# Patient Record
Sex: FEMALE | Race: WHITE | Employment: FULL TIME | URBAN - METROPOLITAN AREA
[De-identification: names, ages, dates, MRNs, and addresses within clinical notes are randomized per-mention and may not be internally consistent; named-entity substitution may affect disease eponyms.]

---

## 2017-04-04 ENCOUNTER — HOSPITAL ENCOUNTER (INPATIENT)
Age: 37
LOS: 1 days | Discharge: HOME | DRG: 639 | End: 2017-04-04
Attending: EMERGENCY MEDICINE | Admitting: INTERNAL MEDICINE
Payer: COMMERCIAL

## 2017-04-04 VITALS
DIASTOLIC BLOOD PRESSURE: 74 MMHG | RESPIRATION RATE: 18 BRPM | OXYGEN SATURATION: 99 % | HEART RATE: 88 BPM | TEMPERATURE: 98.3 F | SYSTOLIC BLOOD PRESSURE: 112 MMHG

## 2017-04-04 DIAGNOSIS — E10.10 DIABETIC KETOACIDOSIS WITHOUT COMA ASSOCIATED WITH TYPE 1 DIABETES MELLITUS (HCC): Primary | ICD-10-CM

## 2017-04-04 PROBLEM — E10.9 DIABETES MELLITUS TYPE 1 (HCC): Status: ACTIVE | Noted: 2017-04-04

## 2017-04-04 PROBLEM — E11.10 DKA (DIABETIC KETOACIDOSES): Status: ACTIVE | Noted: 2017-04-04

## 2017-04-04 LAB
ALBUMIN SERPL BCP-MCNC: 4.4 G/DL (ref 3.5–5)
ALBUMIN/GLOB SERPL: 1.5 {RATIO} (ref 1.2–3.5)
ALP SERPL-CCNC: 85 U/L (ref 50–136)
ALT SERPL-CCNC: 18 U/L (ref 12–65)
ANION GAP BLD CALC-SCNC: 17 MMOL/L (ref 7–16)
ANION GAP BLD CALC-SCNC: 8 MMOL/L (ref 7–16)
ANION GAP BLD CALC-SCNC: 8 MMOL/L (ref 7–16)
ARTERIAL PATENCY WRIST A: ABNORMAL
AST SERPL W P-5'-P-CCNC: 21 U/L (ref 15–37)
BASE DEFICIT BLDA-SCNC: 5.9 MMOL/L (ref 0–2)
BASOPHILS # BLD AUTO: 0 K/UL (ref 0–0.2)
BASOPHILS # BLD: 1 % (ref 0–2)
BDY SITE: ABNORMAL
BILIRUB SERPL-MCNC: 1.2 MG/DL (ref 0.2–1.1)
BUN SERPL-MCNC: 17 MG/DL (ref 6–23)
CALCIUM SERPL-MCNC: 7.9 MG/DL (ref 8.3–10.4)
CALCIUM SERPL-MCNC: 8.1 MG/DL (ref 8.3–10.4)
CALCIUM SERPL-MCNC: 8.4 MG/DL (ref 8.3–10.4)
CHLORIDE SERPL-SCNC: 106 MMOL/L (ref 98–107)
CHLORIDE SERPL-SCNC: 106 MMOL/L (ref 98–107)
CHLORIDE SERPL-SCNC: 96 MMOL/L (ref 98–107)
CO2 SERPL-SCNC: 18 MMOL/L (ref 21–32)
CO2 SERPL-SCNC: 24 MMOL/L (ref 21–32)
CO2 SERPL-SCNC: 25 MMOL/L (ref 21–32)
COHGB MFR BLD: 0.4 % (ref 0.5–1.5)
CREAT SERPL-MCNC: 0.74 MG/DL (ref 0.6–1)
CREAT SERPL-MCNC: 0.74 MG/DL (ref 0.6–1)
CREAT SERPL-MCNC: 0.95 MG/DL (ref 0.6–1)
DIFFERENTIAL METHOD BLD: NORMAL
DO-HGB BLD-MCNC: 2 % (ref 0–5)
EOSINOPHIL # BLD: 0.2 K/UL (ref 0–0.8)
EOSINOPHIL NFR BLD: 2 % (ref 0.5–7.8)
ERYTHROCYTE [DISTWIDTH] IN BLOOD BY AUTOMATED COUNT: 12.3 % (ref 11.9–14.6)
EST. AVERAGE GLUCOSE BLD GHB EST-MCNC: 189 MG/DL
FIO2 ON VENT: 21 %
GLOBULIN SER CALC-MCNC: 2.9 G/DL (ref 2.3–3.5)
GLUCOSE BLD STRIP.AUTO-MCNC: 146 MG/DL (ref 65–100)
GLUCOSE BLD STRIP.AUTO-MCNC: 195 MG/DL (ref 65–100)
GLUCOSE BLD STRIP.AUTO-MCNC: 203 MG/DL (ref 65–100)
GLUCOSE BLD STRIP.AUTO-MCNC: 461 MG/DL (ref 65–100)
GLUCOSE SERPL-MCNC: 184 MG/DL (ref 65–100)
GLUCOSE SERPL-MCNC: 189 MG/DL (ref 65–100)
GLUCOSE SERPL-MCNC: 487 MG/DL (ref 65–100)
HBA1C MFR BLD: 8.2 % (ref 4.8–6)
HCO3 BLDA-SCNC: 17 MMOL/L (ref 22–26)
HCT VFR BLD AUTO: 39.4 % (ref 35.8–46.3)
HGB BLD-MCNC: 13.1 G/DL (ref 11.7–15.4)
HGB BLDMV-MCNC: 12.9 GM/DL (ref 11.7–15)
IMM GRANULOCYTES # BLD: 0 K/UL (ref 0–0.5)
IMM GRANULOCYTES NFR BLD AUTO: 0.3 % (ref 0–5)
LYMPHOCYTES # BLD AUTO: 14 % (ref 13–44)
LYMPHOCYTES # BLD: 1 K/UL (ref 0.5–4.6)
MAGNESIUM SERPL-MCNC: 1.7 MG/DL (ref 1.8–2.4)
MAGNESIUM SERPL-MCNC: 1.8 MG/DL (ref 1.8–2.4)
MAGNESIUM SERPL-MCNC: 1.8 MG/DL (ref 1.8–2.4)
MCH RBC QN AUTO: 29.5 PG (ref 26.1–32.9)
MCHC RBC AUTO-ENTMCNC: 33.2 G/DL (ref 31.4–35)
MCV RBC AUTO: 88.7 FL (ref 79.6–97.8)
METHGB MFR BLD: 0.3 % (ref 0–1.5)
MONOCYTES # BLD: 0.7 K/UL (ref 0.1–1.3)
MONOCYTES NFR BLD AUTO: 10 % (ref 4–12)
NEUTS SEG # BLD: 5.1 K/UL (ref 1.7–8.2)
NEUTS SEG NFR BLD AUTO: 73 % (ref 43–78)
OXYHGB MFR BLDA: 97.1 % (ref 94–97)
PCO2 BLDA: 28 MMHG (ref 35–45)
PH BLDA: 7.41 [PH] (ref 7.35–7.45)
PHOSPHATE SERPL-MCNC: 1.6 MG/DL (ref 2.5–4.5)
PLATELET # BLD AUTO: 204 K/UL (ref 150–450)
PMV BLD AUTO: 10.8 FL (ref 10.8–14.1)
PO2 BLDA: 112 MMHG (ref 75–100)
POTASSIUM SERPL-SCNC: 3.9 MMOL/L (ref 3.5–5.1)
POTASSIUM SERPL-SCNC: 4.1 MMOL/L (ref 3.5–5.1)
POTASSIUM SERPL-SCNC: 5.8 MMOL/L (ref 3.5–5.1)
PROT SERPL-MCNC: 7.3 G/DL (ref 6.3–8.2)
RBC # BLD AUTO: 4.44 M/UL (ref 4.05–5.25)
SAO2 % BLD: 98 % (ref 92–98.5)
SERVICE CMNT-IMP: ABNORMAL
SODIUM SERPL-SCNC: 131 MMOL/L (ref 136–145)
SODIUM SERPL-SCNC: 138 MMOL/L (ref 136–145)
SODIUM SERPL-SCNC: 139 MMOL/L (ref 136–145)
VENTILATION MODE VENT: ABNORMAL
WBC # BLD AUTO: 7 K/UL (ref 4.3–11.1)

## 2017-04-04 PROCEDURE — 96374 THER/PROPH/DIAG INJ IV PUSH: CPT | Performed by: EMERGENCY MEDICINE

## 2017-04-04 PROCEDURE — 74011000258 HC RX REV CODE- 258: Performed by: INTERNAL MEDICINE

## 2017-04-04 PROCEDURE — 74011636637 HC RX REV CODE- 636/637: Performed by: INTERNAL MEDICINE

## 2017-04-04 PROCEDURE — 65610000006 HC RM INTENSIVE CARE

## 2017-04-04 PROCEDURE — 36600 WITHDRAWAL OF ARTERIAL BLOOD: CPT

## 2017-04-04 PROCEDURE — 80053 COMPREHEN METABOLIC PANEL: CPT | Performed by: EMERGENCY MEDICINE

## 2017-04-04 PROCEDURE — 83735 ASSAY OF MAGNESIUM: CPT | Performed by: INTERNAL MEDICINE

## 2017-04-04 PROCEDURE — 85025 COMPLETE CBC W/AUTO DIFF WBC: CPT | Performed by: EMERGENCY MEDICINE

## 2017-04-04 PROCEDURE — 81003 URINALYSIS AUTO W/O SCOPE: CPT | Performed by: EMERGENCY MEDICINE

## 2017-04-04 PROCEDURE — 99285 EMERGENCY DEPT VISIT HI MDM: CPT | Performed by: EMERGENCY MEDICINE

## 2017-04-04 PROCEDURE — 74011636637 HC RX REV CODE- 636/637: Performed by: EMERGENCY MEDICINE

## 2017-04-04 PROCEDURE — 74011250636 HC RX REV CODE- 250/636: Performed by: INTERNAL MEDICINE

## 2017-04-04 PROCEDURE — 82803 BLOOD GASES ANY COMBINATION: CPT

## 2017-04-04 PROCEDURE — 80048 BASIC METABOLIC PNL TOTAL CA: CPT | Performed by: EMERGENCY MEDICINE

## 2017-04-04 PROCEDURE — 83036 HEMOGLOBIN GLYCOSYLATED A1C: CPT | Performed by: INTERNAL MEDICINE

## 2017-04-04 PROCEDURE — 96361 HYDRATE IV INFUSION ADD-ON: CPT | Performed by: EMERGENCY MEDICINE

## 2017-04-04 PROCEDURE — 80048 BASIC METABOLIC PNL TOTAL CA: CPT | Performed by: INTERNAL MEDICINE

## 2017-04-04 PROCEDURE — 84100 ASSAY OF PHOSPHORUS: CPT | Performed by: INTERNAL MEDICINE

## 2017-04-04 PROCEDURE — 83735 ASSAY OF MAGNESIUM: CPT | Performed by: EMERGENCY MEDICINE

## 2017-04-04 PROCEDURE — 74011250636 HC RX REV CODE- 250/636: Performed by: EMERGENCY MEDICINE

## 2017-04-04 PROCEDURE — 82962 GLUCOSE BLOOD TEST: CPT

## 2017-04-04 RX ORDER — DEXTROSE 40 %
15 GEL (GRAM) ORAL AS NEEDED
Status: DISCONTINUED | OUTPATIENT
Start: 2017-04-04 | End: 2017-04-04 | Stop reason: HOSPADM

## 2017-04-04 RX ORDER — DEXTROSE 50 % IN WATER (D50W) INTRAVENOUS SYRINGE
25-50 AS NEEDED
Status: DISCONTINUED | OUTPATIENT
Start: 2017-04-04 | End: 2017-04-04 | Stop reason: HOSPADM

## 2017-04-04 RX ORDER — MONTELUKAST SODIUM 10 MG/1
10 TABLET ORAL DAILY
Status: CANCELLED | OUTPATIENT
Start: 2017-04-05

## 2017-04-04 RX ORDER — SODIUM CHLORIDE 0.9 % (FLUSH) 0.9 %
5-10 SYRINGE (ML) INJECTION EVERY 8 HOURS
Status: DISCONTINUED | OUTPATIENT
Start: 2017-04-04 | End: 2017-04-04 | Stop reason: HOSPADM

## 2017-04-04 RX ORDER — SODIUM CHLORIDE 9 MG/ML
200 INJECTION, SOLUTION INTRAVENOUS CONTINUOUS
Status: DISCONTINUED | OUTPATIENT
Start: 2017-04-04 | End: 2017-04-04 | Stop reason: HOSPADM

## 2017-04-04 RX ORDER — ALBUTEROL SULFATE 90 UG/1
2 AEROSOL, METERED RESPIRATORY (INHALATION)
Status: CANCELLED | OUTPATIENT
Start: 2017-04-04

## 2017-04-04 RX ORDER — SODIUM CHLORIDE 0.9 % (FLUSH) 0.9 %
5-10 SYRINGE (ML) INJECTION EVERY 8 HOURS
Status: DISCONTINUED | OUTPATIENT
Start: 2017-04-04 | End: 2017-04-04

## 2017-04-04 RX ORDER — ACETAMINOPHEN 325 MG/1
650 TABLET ORAL
Status: DISCONTINUED | OUTPATIENT
Start: 2017-04-04 | End: 2017-04-04 | Stop reason: HOSPADM

## 2017-04-04 RX ORDER — SODIUM CHLORIDE 0.9 % (FLUSH) 0.9 %
5-10 SYRINGE (ML) INJECTION AS NEEDED
Status: DISCONTINUED | OUTPATIENT
Start: 2017-04-04 | End: 2017-04-04 | Stop reason: HOSPADM

## 2017-04-04 RX ORDER — SODIUM CHLORIDE 9 MG/ML
125 INJECTION, SOLUTION INTRAVENOUS CONTINUOUS
Status: DISCONTINUED | OUTPATIENT
Start: 2017-04-04 | End: 2017-04-04

## 2017-04-04 RX ORDER — ENOXAPARIN SODIUM 100 MG/ML
40 INJECTION SUBCUTANEOUS EVERY 24 HOURS
Status: DISCONTINUED | OUTPATIENT
Start: 2017-04-04 | End: 2017-04-04 | Stop reason: HOSPADM

## 2017-04-04 RX ORDER — SODIUM CHLORIDE 0.9 % (FLUSH) 0.9 %
5-10 SYRINGE (ML) INJECTION AS NEEDED
Status: DISCONTINUED | OUTPATIENT
Start: 2017-04-04 | End: 2017-04-04

## 2017-04-04 RX ORDER — ONDANSETRON 2 MG/ML
4 INJECTION INTRAMUSCULAR; INTRAVENOUS
Status: DISCONTINUED | OUTPATIENT
Start: 2017-04-04 | End: 2017-04-04 | Stop reason: HOSPADM

## 2017-04-04 RX ADMIN — SODIUM CHLORIDE 8 UNITS/HR: 900 INJECTION, SOLUTION INTRAVENOUS at 16:10

## 2017-04-04 RX ADMIN — INSULIN HUMAN 8 UNITS: 100 INJECTION, SOLUTION PARENTERAL at 14:17

## 2017-04-04 RX ADMIN — SODIUM CHLORIDE 500 ML: 900 INJECTION, SOLUTION INTRAVENOUS at 14:17

## 2017-04-04 RX ADMIN — SODIUM CHLORIDE 200 ML/HR: 900 INJECTION, SOLUTION INTRAVENOUS at 15:25

## 2017-04-04 NOTE — ED PROVIDER NOTES
HPI Comments: 18-year-old lady with a history of diabetes who uses an insulin pump who presents with concerns about very high blood sugar and passing out. Patient says that she wasn't feeling very well at work today and checked her sugar and it was in the 500s. Therefore she gave herself a couple of boluses of insulin but then fainted. Coworkers rechecked her sugar and it was still the 500s. Therefore, EMS was called. On EMS arrival the patient was groggy but couldn't answer questions. The report of that witnesses said she did not fall or hit her head as she was able to sit herself down with assistance. Elements of this note were created using speech recognition software. As such, errors of speech recognition may be present. Patient is a 29 y.o. female presenting with hyperglycemia. The history is provided by the patient. High Blood Sugar    Pertinent negatives include no fever, no diarrhea, no nausea, no vomiting, no dysuria, no hematuria, no headaches, no arthralgias, no myalgias and no chest pain. Past Medical History:   Diagnosis Date    Asthma     Diabetes [facility])        Past Surgical History:   Procedure Laterality Date    HX OOPHORECTOMY           History reviewed. No pertinent family history. Social History     Social History    Marital status: SINGLE     Spouse name: N/A    Number of children: N/A    Years of education: N/A     Occupational History    Not on file. Social History Main Topics    Smoking status: Never Smoker    Smokeless tobacco: Not on file    Alcohol use No    Drug use: No    Sexual activity: Not on file     Other Topics Concern    Not on file     Social History Narrative         ALLERGIES: Percocet [oxycodone-acetaminophen]    Review of Systems   Constitutional: Negative for chills, diaphoresis and fever. HENT: Negative for congestion, rhinorrhea and sore throat. Eyes: Negative for redness and visual disturbance.    Respiratory: Negative for cough, chest tightness, shortness of breath and wheezing. Cardiovascular: Negative for chest pain and palpitations. Gastrointestinal: Negative for abdominal pain, blood in stool, diarrhea, nausea and vomiting. Endocrine: Negative for polydipsia and polyuria. Genitourinary: Negative for dysuria and hematuria. Musculoskeletal: Negative for arthralgias, myalgias and neck stiffness. Skin: Negative for rash. Allergic/Immunologic: Negative for environmental allergies and food allergies. Neurological: Negative for dizziness, weakness and headaches. Hematological: Negative for adenopathy. Does not bruise/bleed easily. Psychiatric/Behavioral: Negative for confusion and sleep disturbance. The patient is not nervous/anxious. Vitals:    04/04/17 1359   BP: 109/63   Pulse: 89   Resp: 18   Temp: 97.9 °F (36.6 °C)   SpO2: 100%            Physical Exam   Constitutional: She is oriented to person, place, and time. She appears well-developed and well-nourished. HENT:   Head: Normocephalic and atraumatic. Eyes: Conjunctivae and EOM are normal. Pupils are equal, round, and reactive to light. Neck: Normal range of motion. Cardiovascular: Normal rate and regular rhythm. Pulmonary/Chest: Effort normal and breath sounds normal. She has no wheezes. She has no rales. She exhibits no tenderness. Mild hyperventilating   Abdominal: Soft. Bowel sounds are normal. There is no rebound and no guarding. Musculoskeletal: Normal range of motion. She exhibits no edema or tenderness. Lymphadenopathy:     She has no cervical adenopathy. Neurological: She is alert and oriented to person, place, and time. Patient will answer questions appropriately although she is mildly somnolent with obvious trouble focusing. Skin: Skin is warm and dry. Psychiatric: She has a normal mood and affect. Nursing note and vitals reviewed.        MDM  Number of Diagnoses or Management Options  Diagnosis management comments: Patient's mental status has improved some here in the emergency department with fluids and insulin. However, given her anion gap and her bicarbonate I do think she will need admission for further care. Therefore, I spoke with the hospitalist who kindly agreed to see her.     ED Course       Procedures

## 2017-04-04 NOTE — ED NOTES
Patient was seen and managed by the hospitalist why in the ER, they feel she is stable for discharge

## 2017-04-04 NOTE — Clinical Note
Status[de-identified] Inpatient [101] Type of Bed: Intensive Care [6] Inpatient Hospitalization Certified Necessary for the Following Reasons: 4. Patient requires ICU level of care interventions (further clarification in H&P documentation) Admitting Diagnosis: DKA (diabetic ketoacidoses[facility] [[id number]] Admitting Physician: Nemo Cunningham Attending Physician: Nemo Cunningham Estimated Length of Stay: > or = to 2 Midnights Discharge Plan[de-identified] Home with Office Follow-up

## 2017-04-04 NOTE — DISCHARGE INSTRUCTIONS
Diabetes Blood Sugar Emergencies: Your Action Plan  How can you prevent a blood sugar emergency? An important part of living with diabetes is keeping your blood sugar in your target range. You'll need to know what to do if it's too high or too low. Managing your blood sugar levels helps you avoid emergencies. This care sheet will teach you about the signs of high and low blood sugar. It will help you make an action plan with your doctor for when these signs occur. Low blood sugar is more likely to happen if you take certain medicines for diabetes. It can also happen if you skip a meal, drink alcohol, or exercise more than usual.  You may get high blood sugar if you eat differently than you normally do. One example is eating more carbohydrate than usual. Having a cold, the flu, or other sudden illness can also cause high blood sugar levels. Levels can also rise if you miss a dose of medicine. Any change in how you take your medicine may affect your blood sugar level. So it's important to work with your doctor before you make any changes. Check your blood sugar  Work with your doctor to fill in the blank spaces below that apply to you. Track your levels, know your target range, and write down ways you can get your blood sugar back in your target range. A log book can help you track your levels. Take the book to all of your medical appointments. · Check your blood sugar _____ times a day, at these times:________________________________________________. (For example: Before meals, at bedtime, before exercise, during exercise, other.)  · Your blood sugar target range before a meal is ___________________. Your blood sugar target range after a meal is _______________________. · Do this--___________________________________________________--to get your blood sugar back within your safe range if your blood sugar results are _________________________________________.  (For example: Less than 70 or above 250 mg/dL.)  Call your doctor when your blood sugar results are ___________________________________. (For example: Less than 70 or above 250 mg/dL.)  What are the symptoms of low and high blood sugar? Common symptoms of low blood sugar are sweating and feeling shaky, weak, hungry, or confused. Symptoms can start quickly. Common symptoms of high blood sugar are feeling very thirsty or very hungry. You may also pass urine more often than usual. You may have blurry vision and may lose weight without trying. But some people may have high or low blood sugar without having any symptoms. That's a good reason to check your blood sugar on a regular schedule. What should you do if you have symptoms? Work with your doctor to fill in the blank spaces below that apply to you. Low blood sugar  If you have symptoms of low blood sugar, check your blood sugar. If it's below _____ (for example, below 70), eat or drink a quick-sugar food that has about 15 grams of carbohydrate. Your goal is to get your level back to your safe range. Check your blood sugar again 15 minutes later. If it's still not in your target range, take another 15 grams of carbohydrate and check your blood sugar again in 15 minutes. Repeat this until you reach your target. Then go back to your regular testing schedule. When you have low blood sugar, it's best to stop or reduce any physical activity until your blood sugar is back in your target range and is stable. If you must stay active, eat or drink 30 grams of carbohydrate. Then check your blood sugar again in 15 minutes. If it's not in your target range, take another 30 grams of carbohydrates. Check your blood sugar again in 15 minutes. Keep doing this until you reach your target. You can then go back to your regular testing schedule. If your symptoms or blood sugar levels are getting worse or have not improved after 15 minutes, seek medical care right away.   Here are some examples of quick-sugar foods with 15 grams of carbohydrate:  · 3 or 4 glucose tablets  · 1 tube of glucose gel  · Hard candy (such as 3 Jolly Ranchers or 5 to 7 Life Savers)  · ½ cup to ¾ cup (4 to 6 ounces) of fruit juice or regular (not diet) soda  High blood sugar  If you have symptoms of high blood sugar, check your blood sugar. Your goal is to get your level back to your target range. If it's above ______ (for example, above 250), follow these steps:  · If you missed a dose of your diabetes medicine, take it now. Take only the amount of medicine that you have been prescribed. Do not take more or less medicine. · Give yourself insulin if your doctor has prescribed it for high blood sugar. · Test for ketones, if the doctor told you to do so. If the results of the ketone test show a moderate-to-large amount of ketones, call the doctor for advice. · Wait 30 minutes after you take the extra insulin or the missed medicine. Check your blood sugar again. If your symptoms or blood sugar levels are getting worse or have not improved after taking these steps, seek medical care right away. Follow-up care is a key part of your treatment and safety. Be sure to make and go to all appointments, and call your doctor if you are having problems. It's also a good idea to know your test results and keep a list of the medicines you take. Where can you learn more? Go to http://cirilo-chela.info/. Enter V092 in the search box to learn more about \"Diabetes Blood Sugar Emergencies: Your Action Plan. \"  Current as of: May 23, 2016  Content Version: 11.2  © 8452-7922 [facility]. Care instructions adapted under license by [employer] (which disclaims liability or warranty for this information). If you have questions about a medical condition or this instruction, always ask your healthcare professional. [address] any warranty or liability for your use of this information.

## 2017-04-04 NOTE — H&P
HOSPITALIST H&P    NAME:  Suyapa Castano   Age:  29 y.o.  :   1980   MRN:   [id number]  PCP: None  Chief complaint: dizziness, hyperglycemia    Subjective:     Patient is a 29 y.o. female who presents with dizziness and hyperglycemia. Pt was at work and felt dizzy so she checked her BG, which was over 500. She gave herself additional insulin from her pump and rechecked later and it was closer to 600. Pt then became faint and sat down with assistance and EMS was called. She denies recent fever, chest pain, dyspnea, dysuria, cough, sore throat, N/V/D, or abdominal pain. She has not been hospitalized for DKA since diagnosis at age 13. Past Medical History:   Diagnosis Date    Asthma     Diabetes ([facility]        Past Surgical History:   Procedure Laterality Date    HX OOPHORECTOMY         No current facility-administered medications on file prior to encounter. Current Outpatient Prescriptions on File Prior to Encounter   Medication Sig Dispense Refill    montelukast (SINGULAIR) 10 mg tablet Take 10 mg by mouth daily.  albuterol (PROVENTIL HFA, VENTOLIN HFA, PROAIR HFA) 90 mcg/actuation inhaler Take 2 Puffs by inhalation.  mometasone-formoterol (DULERA) 200-5 mcg/actuation HFA inhaler Take 2 Puffs by inhalation two (2) times a day.   insulin pump (PATIENT SUPPLIED) misc by SubCUTAneous route as needed.  insulin aspart (NOVOLOG) 100 unit/mL injection by SubCUTAneous route. Allergies   Allergen Reactions    Percocet [Oxycodone-Acetaminophen] Hives       Social History   Substance Use Topics    Smoking status: Never Smoker    Smokeless tobacco: Not on file    Alcohol use No       History reviewed. No pertinent family history. I have personally reviewed and reconciled patients history. Review of Systems  A comprehensive 12 point review of systems is negative other than what is listed above.     Objective:     Patient Vitals for the past 24 hrs:   BP Temp Pulse Resp SpO2   04/04/17 1359 109/63 97.9 °F (36.6 °C) 89 18 100 %       Exam:  General: awake, alert, no apparent distress  Eyes: anicteric  Neck: Supple, trachea midline  Lungs: Clear to auscultation bilaterally. No rales, wheezes, or rhonchi. Heart: Regular rate and rhythm. No appreciable murmur. Abdomen: Soft, nontender, nondistended. Bowel sounds normal.  No rebound tenderness, guarding, or rigidity. Extremities:  No LE edema. Skin: Warm/dry. No rashes or lesions. Neurologic: CN II-XII grossly intact bilaterally. Psych: AOx3. Normal mood and affect. Data Review (Labs):   Recent Results (from the past 24 hour(s))   CBC WITH AUTOMATED DIFF    Collection Time: 04/04/17  2:10 PM   Result Value Ref Range    WBC 7.0 4.3 - 11.1 K/uL    RBC 4.44 4.05 - 5.25 M/uL    HGB 13.1 11.7 - 15.4 g/dL    HCT 39.4 35.8 - 46.3 %    MCV 88.7 79.6 - 97.8 FL    MCH 29.5 26.1 - 32.9 PG    MCHC 33.2 31.4 - 35.0 g/dL    RDW 12.3 11.9 - 14.6 %    PLATELET 442 565 - 363 K/uL    MPV 10.8 10.8 - 14.1 FL    DF AUTOMATED      NEUTROPHILS 73 43 - 78 %    LYMPHOCYTES 14 13 - 44 %    MONOCYTES 10 4.0 - 12.0 %    EOSINOPHILS 2 0.5 - 7.8 %    BASOPHILS 1 0.0 - 2.0 %    IMMATURE GRANULOCYTES 0.3 0.0 - 5.0 %    ABS. NEUTROPHILS 5.1 1.7 - 8.2 K/UL    ABS. LYMPHOCYTES 1.0 0.5 - 4.6 K/UL    ABS. MONOCYTES 0.7 0.1 - 1.3 K/UL    ABS. EOSINOPHILS 0.2 0.0 - 0.8 K/UL    ABS. BASOPHILS 0.0 0.0 - 0.2 K/UL    ABS. IMM.  GRANS. 0.0 0.0 - 0.5 K/UL   METABOLIC PANEL, COMPREHENSIVE    Collection Time: 04/04/17  2:10 PM   Result Value Ref Range    Sodium 131 (L) 136 - 145 mmol/L    Potassium 5.8 (H) 3.5 - 5.1 mmol/L    Chloride 96 (L) 98 - 107 mmol/L    CO2 18 (L) 21 - 32 mmol/L    Anion gap 17 (H) 7 - 16 mmol/L    Glucose 487 (HH) 65 - 100 mg/dL    BUN 17 6 - 23 MG/DL    Creatinine 0.95 0.6 - 1.0 MG/DL    GFR est AA >60 >60 ml/min/1.73m2    GFR est non-AA >60 >60 ml/min/1.73m2    Calcium 8.4 8.3 - 10.4 MG/DL    Bilirubin, total 1.2 (H) 0.2 - 1.1 MG/DL    ALT (SGPT) 18 12 - 65 U/L    AST (SGOT) 21 15 - 37 U/L    Alk. phosphatase 85 50 - 136 U/L    Protein, total 7.3 6.3 - 8.2 g/dL    Albumin 4.4 3.5 - 5.0 g/dL    Globulin 2.9 2.3 - 3.5 g/dL    A-G Ratio 1.5 1.2 - 3.5     MAGNESIUM    Collection Time: 04/04/17  2:10 PM   Result Value Ref Range    Magnesium 1.7 (L) 1.8 - 2.4 mg/dL   GLUCOSE, POC    Collection Time: 04/04/17  2:15 PM   Result Value Ref Range    Glucose (POC) 461 (HH) 65 - 100 mg/dL   BLOOD GAS, ARTERIAL    Collection Time: 04/04/17  2:19 PM   Result Value Ref Range    pH 7.41 7.35 - 7.45      PCO2 28 (L) 35 - 45 mmHg    PO2 112 (H) 75.0 - 100.0 mmHg    BICARBONATE 17 (L) 22 - 26 mmol/L    BASE DEFICIT 5.9 (H) 0 - 2 mmol/L    TOTAL HEMOGLOBIN 12.9 11.7 - 15.0 GM/DL    O2 SAT 98 92.0 - 98.5 %    ARTERIAL O2 HGB 97.1 (H) 94.0 - 97.0 %    CARBOXYHEMOGLOBIN 0.4 (L) 0.5 - 1.5 %    METHEMOGLOBIN 0.3 0.0 - 1.5 %    DEOXYHEMOGLOBIN 2 0.0 - 5.0 %    SITE RB     ALLENS TEST NA     MODE RA     FIO2 21.0 %    Respiratory comment:       Dr Pace Mins at 4 4 2017 2 24 26 PM. Read back.  ROOM AIR       Assessment:   Principal Problem:    DKA (diabetic ketoacidoses) ([facility] (4/4/2017)    Active Problems:    Diabetes mellitus type 1 ([facility] (4/4/2017)        Plan:     Admit to ICU  Insulin infusion  IVF  Monitor electrolytes    DVT prophylaxis: Lovenox  Code Status: FULL    Plan of care discussed with: patient/friends  Time spent on patient care: 30 minutes  Anticipated date of discharge: 2-3 days    [place] DO Janki

## 2017-04-04 NOTE — DISCHARGE SUMMARY
Physician Discharge Summary     Patient: Jony Bai MRN: [id number]  SSN: [id number]    YOB: 1980  Age: 29 y.o. Sex: female       Admit Date: 4/4/2017    Discharge Date: 4/4/2017    Admission Diagnoses: DKA (diabetic ketoacidoses) [facility])    Discharge Diagnoses:   Problem List as of 4/4/2017  Never Reviewed          Codes Class Noted - Resolved    * (Principal)DKA (diabetic ketoacidoses) ([facility] ICD-10-CM: E13.10  ICD-9-CM: 250.10  4/4/2017 - Present        Diabetes mellitus [facility] ([facility] ICD-10-CM: E10.9  ICD-9-CM: 250.01  4/4/2017 - Present               Discharge Condition: Good    Hospital Course: Pt is type 1 diabetic on insulin pump who was admitted earlier today for early DKA. She had IV insulin in the ED and over 3L of IVF. Upon recheck of her BG at the time nurse was to start the insulin infusion, the BG was 195. BMP was rechecked and showed resolution of AG and bicarb was normal.  She was feeling better and wanting to go home. I think it is reasonable for her to go home, drink PO fluids, and restart her insulin pump given that she's had exceptional control up to this point. Primary Care Physician:  None     Consults: None    Disposition: Home    Discharge Medications:   Current Discharge Medication List      CONTINUE these medications which have NOT CHANGED    Details   montelukast (SINGULAIR) 10 mg tablet Take 10 mg by mouth daily. albuterol (PROVENTIL HFA, VENTOLIN HFA, PROAIR HFA) 90 mcg/actuation inhaler Take 2 Puffs by inhalation. mometasone-formoterol (DULERA) 200-5 mcg/actuation HFA inhaler Take 2 Puffs by inhalation two (2) times a day. insulin pump (PATIENT SUPPLIED) misc by SubCUTAneous route as needed. insulin aspart (NOVOLOG) 100 unit/mL injection by SubCUTAneous route.              Activity: Activity as tolerated    Diet: Diabetic Diet    Follow-up Appointments   Procedures    FOLLOW UP VISIT Appointment in: One Week Follow up with PCP in 1-2 weeks Follow up with PCP in 1-2 weeks     Standing Status:   Standing     Number of Occurrences:   1     Order Specific Question:   Appointment in     Answer:    One Week        Time spent on discharge <30 minutes    Signed By: Keven Rehman DO     April 4, 2017

## 2017-04-04 NOTE — PROGRESS NOTES
Visited with patient as no PCP listed in chart. Common law , [place], is at bedside. Demographics on facesheet verified. Patient was diagnosed with IDDM when she was 13 and has been using an insulin pump for approximately 12 yrs. States she originally had the Medtronic pump but her doctor convinced her to switch to One Touch pump 3 yrs ago (states she will go back to Medtronic pump next year when insurance allows her to get a new one). Patient states she moved here from [place] about 5 yrs ago and was unable to find anyone to insure her because of her Diabetes. Continued to follow with her previous providers in [place]. Found an insurance company that would insure her and started seeing [facility] ([phone]) Laurent Montanez NP and has an appointment with her on 4/12/17. She still does not have a PCP. Patient given list of providers that accept her insurance and chose [facility] [id number] [facility]. Called over there ([phone]) and spoke with Joann - appointment made with Shawn HUSSEIN (as she used to work in an Endocrinologists office) on Friday, April 21st at 2pm.  All information written down and given to patient. Understanding verbalized.

## 2017-04-21 PROBLEM — E11.10 DKA (DIABETIC KETOACIDOSES): Status: RESOLVED | Noted: 2017-04-04 | Resolved: 2017-04-21

## 2017-05-15 ENCOUNTER — HOSPITAL ENCOUNTER (OUTPATIENT)
Dept: GENERAL RADIOLOGY | Age: 37
Discharge: HOME | End: 2017-05-15
Attending: PHYSICIAN ASSISTANT
Payer: COMMERCIAL

## 2017-05-15 DIAGNOSIS — R13.14 PHARYNGOESOPHAGEAL DYSPHAGIA: ICD-10-CM

## 2017-05-15 PROCEDURE — 74011000250 HC RX REV CODE- 250: Performed by: PHYSICIAN ASSISTANT

## 2017-05-15 PROCEDURE — 74247 XR UPPER GI W KUB AIR CONT: CPT

## 2017-05-15 PROCEDURE — 74011000255 HC RX REV CODE- 255: Performed by: PHYSICIAN ASSISTANT

## 2017-05-15 RX ADMIN — BARIUM SULFATE 355 ML: 0.6 SUSPENSION ORAL at 09:16

## 2017-05-15 RX ADMIN — BARIUM SULFATE 135 ML: 980 POWDER, FOR SUSPENSION ORAL at 09:16

## 2017-05-15 RX ADMIN — BARIUM SULFATE 700 MG: 700 TABLET ORAL at 09:16

## 2017-05-15 RX ADMIN — ANTACID/ANTIFLATULENT 8 G: 380; 550; 10; 10 GRANULE, EFFERVESCENT ORAL at 09:16

## 2017-05-15 NOTE — PROGRESS NOTES
Discussed results with patient. Advised trying OTC PPI daily - taken first thing in the morning on an empty stomach and wait 30 minutes before taking any other meds, drinking any beverages, or eating any food x 1 month. If she feels that it helps reduce or resolve her symptoms then schedule an appt to come in and I can provide a prescription for longer term use.

## 2017-10-14 ENCOUNTER — HOSPITAL ENCOUNTER (EMERGENCY)
Age: 37
Discharge: HOME | End: 2017-10-15
Payer: COMMERCIAL

## 2017-10-14 DIAGNOSIS — T19.2XXA RETAINED TAMPON, INITIAL ENCOUNTER: Primary | ICD-10-CM

## 2017-10-14 PROCEDURE — 99284 EMERGENCY DEPT VISIT MOD MDM: CPT

## 2017-10-15 VITALS
WEIGHT: 140 LBS | DIASTOLIC BLOOD PRESSURE: 65 MMHG | OXYGEN SATURATION: 100 % | HEIGHT: 64 IN | SYSTOLIC BLOOD PRESSURE: 116 MMHG | TEMPERATURE: 98.6 F | HEART RATE: 70 BPM | RESPIRATION RATE: 16 BRPM | BODY MASS INDEX: 23.9 KG/M2

## 2017-10-15 NOTE — DISCHARGE INSTRUCTIONS
Object in the Vagina: Care Instructions  Your Care Instructions    If something is left in the vagina for too long, it can cause pain and irritation. This could be a tampon, a diaphragm, a pessary, or a vibrator. Sometimes, a condom comes off during sex and gets lost in the vagina. You may have bleeding, a bad smell, and a discharge from the vagina. After the object is taken out, symptoms usually go away. Follow-up care is a key part of your treatment and safety. Be sure to make and go to all appointments, and call your doctor if you are having problems. It's also a good idea to know your test results and keep a list of the medicines you take. How can you care for yourself at home? · If your doctor prescribed antibiotics, take them as directed. Do not stop taking them just because you feel better. You need to take the full course of antibiotics. · Do not use a douche or vaginal wash unless your doctor tells you to. · You can prevent future problems if you limit how long something is in your vagina. For example, change a tampon at least every 4 to 8 hours. When should you call for help? Watch closely for changes in your health, and be sure to contact your doctor if:  · Your symptoms do not improve, or they get worse. · You have a fever. Where can you learn more? Go to http://cirilo-chela.info/. Enter R963 in the search box to learn more about \"Object in the Vagina: Care Instructions. \"  Current as of: March 20, 2017  Content Version: 11.3  © 7084-4801 [employer]. Care instructions adapted under license by [employer] (which disclaims liability or warranty for this information). If you have questions about a medical condition or this instruction, always ask your healthcare professional. [address] any warranty or liability for your use of this information.

## 2017-10-15 NOTE — ED NOTES
I have reviewed discharge instructions with the patient. The patient verbalized understanding. Patient left ED via Discharge Method: ambulatory to Home with  family/friend,     Opportunity for questions and clarification provided. Patient given 0 scripts.

## 2017-10-15 NOTE — ED PROVIDER NOTES
HPI Comments: 41-year-old female thinks she lost her tampon was not sure if it's still in her vagina. She was at a outdoor portable toilet and did not think tampon came out however she cannot find it. Patient is a 40 y.o. female presenting with female genitourinary complaint. The history is provided by the patient. Vaginal Pain    This is a new problem. The current episode started 6 to 12 hours ago. The problem occurs constantly. The problem has not changed since onset. Pertinent negatives include no abdominal pain, no nausea and no dysuria. She has tried nothing for the symptoms. Past Medical History:   Diagnosis Date    Asthma     Allergy-Induced    Perennial allergic rhinitis with seasonal variation     Type 1 diabetes mellitus ([facility] [address]        Past Surgical History:   Procedure Laterality Date    HX  SECTION      HX TYMPANOSTOMY      HX WISDOM TEETH EXTRACTION           Family History:   Problem Relation Age of Onset    Diabetes Father      Type 1    Heart Attack Father 32    Cancer Maternal Grandfather      Lung    Heart Disease Maternal Grandfather     Heart Disease Paternal Grandfather     No Known Problems Mother        Social History     Social History    Marital status: SINGLE     Spouse name: N/A    Number of children: N/A    Years of education: N/A     Occupational History    Not on file. Social History Main Topics    Smoking status: Never Smoker    Smokeless tobacco: Never Used    Alcohol use No    Drug use: No    Sexual activity: Yes     Partners: Male     Other Topics Concern    Not on file     Social History Narrative         ALLERGIES: Percocet [oxycodone-acetaminophen]    Review of Systems   Constitutional: Negative. Negative for activity change. HENT: Negative. Eyes: Negative. Respiratory: Negative. Cardiovascular: Negative. Gastrointestinal: Negative. Negative for abdominal pain and nausea. Genitourinary: Negative.   Negative for dysuria. Musculoskeletal: Negative. Skin: Negative. Neurological: Negative. Psychiatric/Behavioral: Negative. All other systems reviewed and are negative. Vitals:    10/14/17 2253   BP: 114/63   Pulse: 76   Resp: 16   Temp: 97.7 °F (36.5 °C)   SpO2: 98%   Weight: 63.5 kg (140 lb)   Height: 5' 4\" (1.626 m)            Physical Exam   Constitutional: She is oriented to person, place, and time. She appears well-developed and well-nourished. No distress. HENT:   Head: Normocephalic and atraumatic. Right Ear: External ear normal.   Left Ear: External ear normal.   Nose: Mucosal edema and rhinorrhea present. Mouth/Throat: Oropharynx is clear and moist. No oropharyngeal exudate. Eyes: Conjunctivae and EOM are normal. Pupils are equal, round, and reactive to light. Right eye exhibits no discharge. Left eye exhibits no discharge. No scleral icterus. Neck: Normal range of motion. Neck supple. No JVD present. No tracheal deviation present. Cardiovascular: Normal rate, regular rhythm and intact distal pulses. Pulmonary/Chest: Effort normal. No stridor. No respiratory distress. Abdominal: Soft. Bowel sounds are normal. She exhibits no distension and no mass. There is no tenderness. Genitourinary: There is bleeding in the vagina. No erythema or tenderness in the vagina. No foreign body in the vagina. No signs of injury around the vagina. No vaginal discharge found. Musculoskeletal: Normal range of motion. She exhibits no edema or tenderness. Neurological: She is alert and oriented to person, place, and time. No cranial nerve deficit. Skin: Skin is warm and dry. No rash noted. She is not diaphoretic. No erythema. No pallor. Psychiatric: She has a normal mood and affect. Her behavior is normal. Thought content normal.   Nursing note and vitals reviewed.        MDM  Number of Diagnoses or Management Options  Diagnosis management comments: Vaginal vault explored with ring forceps tampon could be found.     ED Course       Procedures

## 2018-12-03 ENCOUNTER — HOSPITAL ENCOUNTER (EMERGENCY)
Age: 38
Discharge: HOME | End: 2018-12-03
Attending: EMERGENCY MEDICINE
Payer: COMMERCIAL

## 2018-12-03 VITALS
SYSTOLIC BLOOD PRESSURE: 127 MMHG | DIASTOLIC BLOOD PRESSURE: 61 MMHG | OXYGEN SATURATION: 98 % | HEART RATE: 82 BPM | RESPIRATION RATE: 16 BRPM | HEIGHT: 64 IN | BODY MASS INDEX: 25.27 KG/M2 | TEMPERATURE: 98 F | WEIGHT: 148 LBS

## 2018-12-03 DIAGNOSIS — G43.809 OTHER MIGRAINE WITHOUT STATUS MIGRAINOSUS, NOT INTRACTABLE: Primary | ICD-10-CM

## 2018-12-03 DIAGNOSIS — R11.2 NON-INTRACTABLE VOMITING WITH NAUSEA, UNSPECIFIED VOMITING TYPE: ICD-10-CM

## 2018-12-03 LAB
ANION GAP SERPL CALC-SCNC: 7 MMOL/L
BUN SERPL-MCNC: 19 MG/DL (ref 6–23)
CALCIUM SERPL-MCNC: 8.5 MG/DL (ref 8.3–10.4)
CHLORIDE SERPL-SCNC: 102 MMOL/L (ref 98–107)
CO2 SERPL-SCNC: 29 MMOL/L (ref 21–32)
CREAT SERPL-MCNC: 0.82 MG/DL (ref 0.6–1)
ERYTHROCYTE [DISTWIDTH] IN BLOOD BY AUTOMATED COUNT: 12.2 % (ref 11.9–14.6)
GLUCOSE SERPL-MCNC: 276 MG/DL (ref 65–100)
HCG UR QL: NEGATIVE
HCT VFR BLD AUTO: 40 % (ref 35.8–46.3)
HGB BLD-MCNC: 13 G/DL (ref 11.7–15.4)
MCH RBC QN AUTO: 29.2 PG (ref 26.1–32.9)
MCHC RBC AUTO-ENTMCNC: 32.5 G/DL (ref 31.4–35)
MCV RBC AUTO: 89.9 FL (ref 79.6–97.8)
NRBC # BLD: 0 K/UL (ref 0–0.2)
PLATELET # BLD AUTO: 236 K/UL (ref 150–450)
PMV BLD AUTO: 9.9 FL (ref 9.4–12.3)
POTASSIUM SERPL-SCNC: 4.2 MMOL/L (ref 3.5–5.1)
RBC # BLD AUTO: 4.45 M/UL (ref 4.05–5.2)
SODIUM SERPL-SCNC: 138 MMOL/L (ref 136–145)
WBC # BLD AUTO: 5.4 K/UL (ref 4.3–11.1)

## 2018-12-03 PROCEDURE — 85027 COMPLETE CBC AUTOMATED: CPT

## 2018-12-03 PROCEDURE — 74011250636 HC RX REV CODE- 250/636: Performed by: EMERGENCY MEDICINE

## 2018-12-03 PROCEDURE — 80048 BASIC METABOLIC PNL TOTAL CA: CPT

## 2018-12-03 PROCEDURE — 81003 URINALYSIS AUTO W/O SCOPE: CPT | Performed by: EMERGENCY MEDICINE

## 2018-12-03 PROCEDURE — 99284 EMERGENCY DEPT VISIT MOD MDM: CPT | Performed by: EMERGENCY MEDICINE

## 2018-12-03 PROCEDURE — 96374 THER/PROPH/DIAG INJ IV PUSH: CPT | Performed by: EMERGENCY MEDICINE

## 2018-12-03 PROCEDURE — 81025 URINE PREGNANCY TEST: CPT

## 2018-12-03 RX ORDER — SODIUM CHLORIDE 0.9 % (FLUSH) 0.9 %
5-10 SYRINGE (ML) INJECTION AS NEEDED
Status: DISCONTINUED | OUTPATIENT
Start: 2018-12-03 | End: 2018-12-03 | Stop reason: HOSPADM

## 2018-12-03 RX ORDER — SODIUM CHLORIDE 0.9 % (FLUSH) 0.9 %
5-10 SYRINGE (ML) INJECTION EVERY 8 HOURS
Status: DISCONTINUED | OUTPATIENT
Start: 2018-12-03 | End: 2018-12-03 | Stop reason: HOSPADM

## 2018-12-03 RX ORDER — BUTALBITAL, ACETAMINOPHEN AND CAFFEINE 300; 40; 50 MG/1; MG/1; MG/1
1 CAPSULE ORAL 3 TIMES DAILY
Qty: 11 CAP | Refills: 0 | Status: SHIPPED | OUTPATIENT
Start: 2018-12-03 | End: 2019-03-02

## 2018-12-03 RX ORDER — PROMETHAZINE HYDROCHLORIDE 25 MG/1
25 TABLET ORAL
Qty: 12 TAB | Refills: 0 | Status: SHIPPED | OUTPATIENT
Start: 2018-12-03 | End: 2019-03-02

## 2018-12-03 RX ORDER — ONDANSETRON 2 MG/ML
4 INJECTION INTRAMUSCULAR; INTRAVENOUS
Status: COMPLETED | OUTPATIENT
Start: 2018-12-03 | End: 2018-12-03

## 2018-12-03 RX ADMIN — ONDANSETRON 4 MG: 2 INJECTION INTRAMUSCULAR; INTRAVENOUS at 08:45

## 2018-12-03 RX ADMIN — SODIUM CHLORIDE 1000 ML: 900 INJECTION, SOLUTION INTRAVENOUS at 08:45

## 2018-12-03 NOTE — ED NOTES
I have reviewed discharge instructions with the patient. The patient verbalized understanding. Patient left ED via ambulation to home  with self. Opportunity for questions and clarification provided. Patient given 2 scripts. Pacheco Stewart RN To continue your aftercare when you leave the hospital, you may receive an automated call from our care team to check in on how you are doing. This is a free service and part of our promise to provide the best care and service to meet your aftercare needs.  If you have questions, or wish to unsubscribe from this service please call [phone]. Thank you for Choosing our [facility] Emergency Department.

## 2018-12-03 NOTE — ED PROVIDER NOTES
61-year-old lady presents with concerns about nausea and vomiting that started yesterday. She said she also feels like she has a migraine headache. Her emesis has been nonbloody, nonbilious. She's had no fevers or chills. With her headache. She says it is an 8 out of 10, does not radiate anywhere. She denies any weakness, numbness, or speech problems. Patient does note that they recently got back from vacation to [place]. Elements of this note were created using speech recognition software. As such, errors of speech recognition may be present. Past Medical History:  
Diagnosis Date  Asthma Allergy-Induced  Perennial allergic rhinitis with seasonal variation  [facility] ([facility] Past Surgical History:  
Procedure Laterality Date  HX  SECTION    HX TYMPANOSTOMY  HX WISDOM TEETH EXTRACTION Family History:  
Problem Relation Age of Onset  Diabetes Father Type 1  
 Heart Attack Father 32  Cancer Maternal Grandfather Lung  Heart Disease Maternal Grandfather  Heart Disease Paternal Grandfather  No Known Problems Mother Social History Socioeconomic History  Marital status: SINGLE Spouse name: Not on file  Number of children: Not on file  Years of education: Not on file  Highest education level: Not on file Social Needs  Financial resource strain: Not on file  Food insecurity - worry: Not on file  Food insecurity - inability: Not on file  Transportation needs - medical: Not on file  Transportation needs - non-medical: Not on file Occupational History  Not on file Tobacco Use  Smoking status: Never Smoker  Smokeless tobacco: Never Used Substance and Sexual Activity  Alcohol use: No  
 Drug use: No  
 Sexual activity: Yes  
  Partners: Male Other Topics Concern  Not on file Social History Narrative  Not on file ALLERGIES: Percocet [oxycodone-acetaminophen] Review of Systems Constitutional: Negative for chills, diaphoresis and fever. HENT: Negative for congestion, rhinorrhea and sore throat. Eyes: Negative for redness and visual disturbance. Respiratory: Negative for cough, chest tightness, shortness of breath and wheezing. Cardiovascular: Negative for chest pain and palpitations. Gastrointestinal: Positive for nausea and vomiting. Negative for abdominal pain, blood in stool and diarrhea. Endocrine: Negative for polydipsia and polyuria. Genitourinary: Negative for dysuria and hematuria. Musculoskeletal: Negative for arthralgias, myalgias and neck stiffness. Skin: Negative for rash. Allergic/Immunologic: Negative for environmental allergies and food allergies. Neurological: Positive for headaches. Negative for dizziness and weakness. Hematological: Negative for adenopathy. Does not bruise/bleed easily. Psychiatric/Behavioral: Negative for confusion and sleep disturbance. The patient is not nervous/anxious. Vitals:  
 12/03/18 0818 BP: 127/61 Pulse: 82 Resp: 16 Temp: 98 °F (36.7 °C) SpO2: 98% Weight: 67.1 kg (148 lb) Height: 5' 4\" (1.626 m) Physical Exam  
Constitutional: She is oriented to person, place, and time. She appears well-developed and well-nourished. HENT:  
Head: Normocephalic and atraumatic. Mouth/Throat: Oropharynx is clear and moist.  
Eyes: Conjunctivae are normal. Pupils are equal, round, and reactive to light. Right eye exhibits no discharge. Left eye exhibits no discharge. Neck: No thyromegaly present. Cardiovascular: Normal rate, regular rhythm and normal heart sounds. No murmur heard. Pulmonary/Chest: Effort normal and breath sounds normal.  
Abdominal: Soft. Bowel sounds are normal. There is no tenderness. There is no rebound and no guarding. Musculoskeletal: Normal range of motion. She exhibits no edema. Neurological: She is alert and oriented to person, place, and time. She exhibits normal muscle tone. Coordination normal.  
Skin: Skin is warm and dry. Psychiatric: She has a normal mood and affect. Her behavior is normal.  
  
 
MDM Number of Diagnoses or Management Options Diagnosis management comments: Patient says she had to drive herself here. Given her vomiting. I'll check her electrolytes. I will treat her nausea with IV Zofran. Labs are unremarkable. I will discharge her home with some Phenergan and Fioricet. Procedures

## 2018-12-03 NOTE — DISCHARGE INSTRUCTIONS
Return with any fevers, blood in your vomit, worsening symptoms, or additional concerns. Follow-up with your primary care doctor in 2 or 3 days for reevaluation.

## 2018-12-03 NOTE — ED TRIAGE NOTES
Pt states she has had migraine since Friday. Pt states she is type 1 diabetic and BGL has also been elevated. Pt states today it was 220. Pt states she traveled to [address] [place] [address] last week and got water in her ears while snorkeling.  
 
Carmen Sierra RN

## 2018-12-03 NOTE — ED NOTES
Patient stated that her \"head was pounding. \" Notified MD. Patient had to drive herself home which was the concern for giving her medication. Patient had told MD she had taken ibuprofen this morning.

## 2019-03-02 ENCOUNTER — HOSPITAL ENCOUNTER (EMERGENCY)
Age: 39
Discharge: HOME | End: 2019-03-02
Attending: EMERGENCY MEDICINE
Payer: COMMERCIAL

## 2019-03-02 ENCOUNTER — APPOINTMENT (OUTPATIENT)
Dept: GENERAL RADIOLOGY | Age: 39
End: 2019-03-02
Attending: EMERGENCY MEDICINE
Payer: COMMERCIAL

## 2019-03-02 VITALS
BODY MASS INDEX: 23.73 KG/M2 | HEART RATE: 78 BPM | TEMPERATURE: 98.3 F | HEIGHT: 64 IN | OXYGEN SATURATION: 97 % | SYSTOLIC BLOOD PRESSURE: 118 MMHG | DIASTOLIC BLOOD PRESSURE: 79 MMHG | RESPIRATION RATE: 16 BRPM | WEIGHT: 139 LBS

## 2019-03-02 DIAGNOSIS — S50.01XA CONTUSION OF RIGHT ELBOW, INITIAL ENCOUNTER: Primary | ICD-10-CM

## 2019-03-02 DIAGNOSIS — S50.311A ABRASION OF RIGHT ELBOW, INITIAL ENCOUNTER: ICD-10-CM

## 2019-03-02 PROCEDURE — 90715 TDAP VACCINE 7 YRS/> IM: CPT | Performed by: EMERGENCY MEDICINE

## 2019-03-02 PROCEDURE — 90471 IMMUNIZATION ADMIN: CPT | Performed by: EMERGENCY MEDICINE

## 2019-03-02 PROCEDURE — 74011250637 HC RX REV CODE- 250/637: Performed by: EMERGENCY MEDICINE

## 2019-03-02 PROCEDURE — 73080 X-RAY EXAM OF ELBOW: CPT

## 2019-03-02 PROCEDURE — 99283 EMERGENCY DEPT VISIT LOW MDM: CPT | Performed by: EMERGENCY MEDICINE

## 2019-03-02 PROCEDURE — 74011250636 HC RX REV CODE- 250/636: Performed by: EMERGENCY MEDICINE

## 2019-03-02 RX ORDER — IBUPROFEN 600 MG/1
600 TABLET ORAL
Status: COMPLETED | OUTPATIENT
Start: 2019-03-02 | End: 2019-03-02

## 2019-03-02 RX ADMIN — TETANUS TOXOID, REDUCED DIPHTHERIA TOXOID AND ACELLULAR PERTUSSIS VACCINE, ADSORBED 0.5 ML: 5; 2.5; 8; 8; 2.5 SUSPENSION INTRAMUSCULAR at 19:37

## 2019-03-02 RX ADMIN — IBUPROFEN 600 MG: 600 TABLET ORAL at 19:37

## 2019-03-02 NOTE — ED TRIAGE NOTES
Pt is complaining of elbow and forearm pain from a fall while [place]. States her right elbow is painfuland abraised.

## 2019-03-03 NOTE — ED NOTES
I have reviewed discharge instructions with the patient. The patient verbalized understanding. Patient left ED via Discharge Method: ambulatory to Home with  ). Opportunity for questions and clarification provided. Patient given 0 scripts. To continue your aftercare when you leave the hospital, you may receive an automated call from our care team to check in on how you are doing. This is a free service and part of our promise to provide the best care and service to meet your aftercare needs.  If you have questions, or wish to unsubscribe from this service please call [phone]. Thank you for Choosing our [facility] Emergency Department.

## 2019-03-03 NOTE — DISCHARGE INSTRUCTIONS
Patient Education   OTC ibuprofen 400-600 mg every 6 hours for pain. Tylenol 500-650 mg every 6 hours for pain. Ice to the sore area for 15-20 minutes every 4 hours while awake for 2-3 days then change to heat for 2-3 days. Follow up with your Dr or the Dr provided in 7-14 days if not improving. Return if worse, any new or concerning symptoms. Clean the wound twice daily with antibacterial hand soap and water, dry then apply antibiotic ointment and cover. Stop the ointment after 2-3 days to allow the abrasion to scab over and heal, but continue to clean twice daily with antibiotic hand soap and water and cover until healed. Return if signs of infection-increasd pain, redness, warmth and pus from the wound. Scrapes (Abrasions): Care Instructions  Your Care Instructions  Scrapes (abrasions) are wounds where your skin has been rubbed or torn off. Most scrapes do not go deep into the skin, but some may remove several layers of skin. Scrapes usually don't bleed much, but they may ooze pinkish fluid. Scrapes on the head or face may appear worse than they are. They may bleed a lot because of the good blood supply to this area. Most scrapes heal well and may not need a bandage. They usually heal within 3 to 7 days. A large, deep scrape may take 1 to 2 weeks or longer to heal. A scab may form on some scrapes. Follow-up care is a key part of your treatment and safety. Be sure to make and go to all appointments, and call your doctor if you are having problems. It's also a good idea to know your test results and keep a list of the medicines you take. How can you care for yourself at home? · If your doctor told you how to care for your wound, follow your doctor's instructions. If you did not get instructions, follow this general advice:  ? Wash the scrape with clean water 2 times a day. Don't use hydrogen peroxide or alcohol, which can slow healing.   ? You may cover the scrape with a thin layer of petroleum jelly, such as Vaseline, and a nonstick bandage. ? Apply more petroleum jelly and replace the bandage as needed. · Prop up the injured area on a pillow anytime you sit or lie down during the next 3 days. Try to keep it above the level of your heart. This will help reduce swelling. · Be safe with medicines. Take pain medicines exactly as directed. ? If the doctor gave you a prescription medicine for pain, take it as prescribed. ? If you are not taking a prescription pain medicine, ask your doctor if you can take an over-the-counter medicine. When should you call for help? Call your doctor now or seek immediate medical care if:    · You have signs of infection, such as:  ? Increased pain, swelling, warmth, or redness around the scrape. ? Red streaks leading from the scrape. ? Pus draining from the scrape. ? A fever.     · The scrape starts to bleed, and blood soaks through the bandage. Oozing small amounts of blood is normal.    Watch closely for changes in your health, and be sure to contact your doctor if the scrape is not getting better each day. Where can you learn more? Go to http://cirilo-chela.info/. Enter A374 in the search box to learn more about \"Scrapes (Abrasions): Care Instructions. \"  Current as of: September 23, 2018  Content Version: 11.9  © 7286-8534 [employer]. Care instructions adapted under license by [employer] (which disclaims liability or warranty for this information). If you have questions about a medical condition or this instruction, always ask your healthcare professional. [place] [address] any warranty or liability for your use of this information.

## 2019-03-03 NOTE — ED PROVIDER NOTES
The history is provided by the patient. Fall The accident occurred yesterday. The fall occurred while walking. She fell from a height of ground level. She landed on concrete. The volume of blood lost was minimal. Point of impact: right elbow. Pain location: right elbow. The pain is moderate. She was ambulatory at the scene. There was no entrapment after the fall. Associated symptoms include numbness (numbness atsite of injury around elbow described more as a burningburning pain). Pertinent negatives include no fever, no loss of consciousness and no tingling. The symptoms are aggravated by use of injured limb. She has tried acetaminophen for the symptoms. The treatment provided mild relief. Past Medical History:  
Diagnosis Date  Asthma Allergy-Induced  Perennial allergic rhinitis with seasonal variation  Type 1 diabetes mellitus ([facility] [address] Past Surgical History:  
Procedure Laterality Date  HX  SECTION    HX TYMPANOSTOMY  HX WISDOM TEETH EXTRACTION Family History:  
Problem Relation Age of Onset  Diabetes Father Type 1  
 Heart Attack Father 32  Cancer Maternal Grandfather Lung  Heart Disease Maternal Grandfather  Heart Disease Paternal Grandfather  No Known Problems Mother Social History Socioeconomic History  Marital status: SINGLE Spouse name: Not on file  Number of children: Not on file  Years of education: Not on file  Highest education level: Not on file Social Needs  Financial resource strain: Not on file  Food insecurity - worry: Not on file  Food insecurity - inability: Not on file  Transportation needs - medical: Not on file  Transportation needs - non-medical: Not on file Occupational History  Not on file Tobacco Use  Smoking status: Never Smoker  Smokeless tobacco: Never Used Substance and Sexual Activity  Alcohol use: No  
 Drug use:  No  
  Sexual activity: Yes  
  Partners: Male Other Topics Concern  Not on file Social History Narrative  Not on file ALLERGIES: Mint and Percocet [oxycodone-acetaminophen] Review of Systems Constitutional: Negative for fever. Neurological: Positive for numbness (numbness atsite of injury around elbow described more as a burningburning pain). Negative for tingling, loss of consciousness and weakness. Vitals:  
 03/02/19 1813 BP: 136/79 Pulse: 80 Resp: 16 Temp: 98.6 °F (37 °C) SpO2: 99% Weight: 63 kg (139 lb) Height: 5' 4\" (1.626 m) Physical Exam  
Constitutional: She appears well-developed and well-nourished. No distress. Cardiovascular: Normal rate, regular rhythm and normal heart sounds. Pulmonary/Chest: Effort normal and breath sounds normal.  
Musculoskeletal:  
Several abrasions of right elbow now scabbed over and without signs of infection. No erythema or purulence. Swelling around PROCESS. TENDERNESS DIFFUSE ELBOW AREA. NO MID OR PROXIMAL HUMERUS TENDERNESS OR MID OR DISTAL FOREARM TENDERNESS. NEUROVASCULARLY INTACT. 2+ RADIAL PULSE PRESENT. MOTOR SENSATION intact. Neurological: She is alert. Nursing note and vitals reviewed. MDM Number of Diagnoses or Management Options Diagnosis management comments: Fall yesterday with right elbow injury, swelling and abrasion. Imaging to exclude fracture. Update tetanus shot. Amount and/or Complexity of Data Reviewed Tests in the radiology section of CPT®: ordered and reviewed (Xr Elbow Rt Min 3 V Result Date: 3/2/2019 RIGHT ELBOW SERIES HISTORY: Fall. Extreme right elbow pain. FINDINGS: 3 views of the elbow demonstrate no displaced fracture, dislocation or visible joint effusion. IMPRESSION: No displaced fracture. ) Procedures

## 2019-09-03 ENCOUNTER — HOSPITAL ENCOUNTER (EMERGENCY)
Age: 39
Discharge: HOME | End: 2019-09-04
Attending: EMERGENCY MEDICINE
Payer: COMMERCIAL

## 2019-09-03 DIAGNOSIS — E16.2 HYPOGLYCEMIA: Primary | ICD-10-CM

## 2019-09-03 LAB
ANION GAP SERPL CALC-SCNC: 12 MMOL/L (ref 7–16)
BASOPHILS # BLD: 0 K/UL (ref 0–0.2)
BASOPHILS NFR BLD: 0 % (ref 0–2)
BUN SERPL-MCNC: 19 MG/DL (ref 6–23)
CALCIUM SERPL-MCNC: 9 MG/DL (ref 8.3–10.4)
CHLORIDE SERPL-SCNC: 102 MMOL/L (ref 98–107)
CO2 SERPL-SCNC: 27 MMOL/L (ref 21–32)
CREAT SERPL-MCNC: 0.66 MG/DL (ref 0.6–1)
DIFFERENTIAL METHOD BLD: ABNORMAL
EOSINOPHIL # BLD: 0.3 K/UL (ref 0–0.8)
EOSINOPHIL NFR BLD: 3 % (ref 0.5–7.8)
ERYTHROCYTE [DISTWIDTH] IN BLOOD BY AUTOMATED COUNT: 11.8 % (ref 11.9–14.6)
GLUCOSE BLD STRIP.AUTO-MCNC: 93 MG/DL (ref 65–100)
GLUCOSE SERPL-MCNC: 95 MG/DL (ref 65–100)
HCT VFR BLD AUTO: 42.9 % (ref 35.8–46.3)
HGB BLD-MCNC: 14.6 G/DL (ref 11.7–15.4)
IMM GRANULOCYTES # BLD AUTO: 0 K/UL (ref 0–0.5)
IMM GRANULOCYTES NFR BLD AUTO: 0 % (ref 0–5)
LYMPHOCYTES # BLD: 2.6 K/UL (ref 0.5–4.6)
LYMPHOCYTES NFR BLD: 28 % (ref 13–44)
MCH RBC QN AUTO: 30.8 PG (ref 26.1–32.9)
MCHC RBC AUTO-ENTMCNC: 34 G/DL (ref 31.4–35)
MCV RBC AUTO: 90.5 FL (ref 79.6–97.8)
MONOCYTES # BLD: 0.9 K/UL (ref 0.1–1.3)
MONOCYTES NFR BLD: 10 % (ref 4–12)
NEUTS SEG # BLD: 5.3 K/UL (ref 1.7–8.2)
NEUTS SEG NFR BLD: 58 % (ref 43–78)
NRBC # BLD: 0 K/UL (ref 0–0.2)
PLATELET # BLD AUTO: 276 K/UL (ref 150–450)
PMV BLD AUTO: 10.4 FL (ref 9.4–12.3)
POTASSIUM SERPL-SCNC: 3.6 MMOL/L (ref 3.5–5.1)
RBC # BLD AUTO: 4.74 M/UL (ref 4.05–5.2)
SODIUM SERPL-SCNC: 141 MMOL/L (ref 136–145)
WBC # BLD AUTO: 9.1 K/UL (ref 4.3–11.1)

## 2019-09-03 PROCEDURE — 82962 GLUCOSE BLOOD TEST: CPT

## 2019-09-03 PROCEDURE — 85025 COMPLETE CBC W/AUTO DIFF WBC: CPT

## 2019-09-03 PROCEDURE — 99283 EMERGENCY DEPT VISIT LOW MDM: CPT | Performed by: EMERGENCY MEDICINE

## 2019-09-03 PROCEDURE — 80048 BASIC METABOLIC PNL TOTAL CA: CPT

## 2019-09-04 VITALS
RESPIRATION RATE: 16 BRPM | OXYGEN SATURATION: 100 % | HEART RATE: 84 BPM | HEIGHT: 64 IN | DIASTOLIC BLOOD PRESSURE: 74 MMHG | BODY MASS INDEX: 24.75 KG/M2 | TEMPERATURE: 98.1 F | SYSTOLIC BLOOD PRESSURE: 124 MMHG | WEIGHT: 145 LBS

## 2019-09-04 NOTE — DISCHARGE INSTRUCTIONS
Patient Education        Learning About Low Blood Sugar (Hypoglycemia) in Diabetes  What is low blood sugar (hypoglycemia)? Hypoglycemia means that your blood sugar is low and your body (especially your brain) is not getting enough fuel. If you have diabetes, your blood sugar can go too low if you take too much of some diabetes medicines. It can also go too low if you miss a meal. And it can happen if you exercise too hard without eating enough food. Some medicines used to treat other health problems can cause low blood sugar too. What are the symptoms? Symptoms of low blood sugar can start quickly. It may take just 10 to 15 minutes. If you have had diabetes for many years, you may not realize that your blood sugar is low until it drops very low. · If your blood sugar level drops below 70 (mild low blood sugar), you may feel tired, anxious, dizzy, weak, shaky, or sweaty. You may have a fast heartbeat or blurry vision. · If your blood sugar level continues to drop (usually below 40), your behavior may change. You may feel more irritable. You may find it hard to concentrate or talk. And you may feel unsteady when you stand or walk. You may become too weak or confused to eat something with sugar to raise your blood sugar level. · If your blood sugar level drops very low (usually below 20), you may pass out (lose consciousness). Or you may have a seizure or stroke. If you have symptoms of severe low blood sugar, you need to get medical care right away. If you had a low blood sugar level during the night, you may wake up tired or with a headache. Or you may sweat so much during the night that your pajamas or sheets are damp when you wake up. How is low blood sugar treated? You can treat low blood sugar by eating or drinking something that has 15 grams of carbohydrate. These should be quick-sugar foods.  Check your blood sugar level again 15 minutes after having a quick-sugar food to make sure your level is getting back to your target range. Here are examples of quick-sugar foods that have 15 grams of carbohydrate:  · 3 to 4 glucose tablets  · 1 tube of glucose gel  · Hard candy (such as 3 Jolly Ranchers or 5 to 7 Life Savers)  · 1 tablespoon honey  · 2 tablespoons of raisins  · ½ cup to ¾ cup (4 to 6 ounces) of fruit juice or regular (not diet) soda  · 1 tablespoon of sugar  · 1 cup of fat-free milk  If you have problems with severe low blood sugar, someone else may have to give you a shot of glucagon. This is a hormone that raises blood sugar levels quickly. How can you prevent low blood sugar? You can take steps to prevent low blood sugar. · Follow your treatment plan. Take your insulin or other diabetes medicine exactly as your doctor prescribed it. Talk with your doctor if you're having low blood sugar often. Your medicine may need to be adjusted if it's causing your low blood sugar. · Check your blood sugar levels often. This helps you find early changes before an emergency happens. · Keep a quick-sugar food with you in case your blood sugar level drops low. · Eat small meals more often so that you don't get too hungry between meals. Don't skip meals. · Balance extra exercise with eating more. Check your blood sugar and learn how it changes after exercise. If your blood sugar stays at a normal level, you may not need to eat after you exercise. · Limit how much alcohol you drink. Alcohol can make low blood sugar go even lower. Don't drink alcohol if you have problems recognizing the early signs of low blood sugar. · Keep a diary of your symptoms. This helps you learn when changes in your body may signal low blood sugar. And keep track of how often you have low blood sugar, including when you last ate and what you ate. This will help you learn what causes your blood sugar to drop. · Learn about diabetes and low blood sugar.  Support groups or a diabetes education center can help you understand how medicines, diet, and exercise affect your blood sugar levels. Since low blood sugar levels can quickly become an emergency, be sure to wear medical alert jewelry, such as a medical alert bracelet. This is to let people know you have diabetes so they can get help for you. You can buy this at most drugstores. And make sure your family, friends, and coworkers know the symptoms of low blood sugar. Teach them what to do to get your sugar level up. Follow-up care is a key part of your treatment and safety. Be sure to make and go to all appointments, and call your doctor if you are having problems. It's also a good idea to know your test results and keep a list of the medicines you take. Where can you learn more? Go to http://cirilo-chela.info/. Enter L427 in the search box to learn more about \"Learning About Low Blood Sugar (Hypoglycemia) in Diabetes. \"  Current as of: July 25, 2018  Content Version: 12.1  © 0278-1890 [employer]. Care instructions adapted under license by [employer] (which disclaims liability or warranty for this information). If you have questions about a medical condition or this instruction, always ask your healthcare professional. [place] [address] any warranty or liability for your use of this information.        Contact your endocrine doctor tomorrow  Track your blood sugars

## 2019-09-04 NOTE — ED NOTES
I have reviewed discharge instructions with the patient. The patient verbalized understanding. Patient left ED via Discharge Method: ambulatory to Home with ( family, self). Opportunity for questions and clarification provided. Patient given 0 scripts. To continue your aftercare when you leave the hospital, you may receive an automated call from our care team to check in on how you are doing. This is a free service and part of our promise to provide the best care and service to meet your aftercare needs.  If you have questions, or wish to unsubscribe from this service please call [phone]. Thank you for Choosing our [facility] Emergency Department.

## 2019-09-04 NOTE — ED NOTES
Pt presents to the ED for hypoglycemia and vomiting today.  Generalized weakness and her blood sugar keeps dropping

## 2019-09-04 NOTE — ED TRIAGE NOTES
Pt presents to the ED with hypogylcemia and vomiting,  Reports difficulty keeping glucose elevated. Currently in triage 93,  Juice provided for pt. She reports she has tried several things to keep sugar elevated however has vomited x 2  Reports continued generalized weakness.

## 2019-11-20 ENCOUNTER — HOSPITAL ENCOUNTER (EMERGENCY)
Age: 39
Discharge: HOME | End: 2019-11-20
Attending: EMERGENCY MEDICINE
Payer: COMMERCIAL

## 2019-11-20 ENCOUNTER — APPOINTMENT (OUTPATIENT)
Dept: GENERAL RADIOLOGY | Age: 39
End: 2019-11-20
Attending: EMERGENCY MEDICINE
Payer: COMMERCIAL

## 2019-11-20 VITALS
TEMPERATURE: 98.3 F | DIASTOLIC BLOOD PRESSURE: 66 MMHG | OXYGEN SATURATION: 98 % | SYSTOLIC BLOOD PRESSURE: 141 MMHG | HEART RATE: 86 BPM | RESPIRATION RATE: 16 BRPM

## 2019-11-20 DIAGNOSIS — J01.00 ACUTE MAXILLARY SINUSITIS, RECURRENCE NOT SPECIFIED: Primary | ICD-10-CM

## 2019-11-20 PROCEDURE — 99283 EMERGENCY DEPT VISIT LOW MDM: CPT | Performed by: EMERGENCY MEDICINE

## 2019-11-20 PROCEDURE — 71046 X-RAY EXAM CHEST 2 VIEWS: CPT

## 2019-11-20 RX ORDER — AZITHROMYCIN 250 MG/1
TABLET, FILM COATED ORAL
Qty: 6 TAB | Refills: 0 | Status: SHIPPED | OUTPATIENT
Start: 2019-11-20 | End: 2021-03-23 | Stop reason: ALTCHOICE

## 2019-11-20 NOTE — ED NOTES
I have reviewed discharge instructions with the patient. The patient verbalized understanding. Patient left ED via Discharge Method: ambulatory to Home with self. Opportunity for questions and clarification provided. Patient given 1 scripts. To continue your aftercare when you leave the hospital, you may receive an automated call from our care team to check in on how you are doing. This is a free service and part of our promise to provide the best care and service to meet your aftercare needs.  If you have questions, or wish to unsubscribe from this service please call [phone]. Thank you for Choosing our [facility] Emergency Department.

## 2019-11-20 NOTE — DISCHARGE INSTRUCTIONS
Patient Education        Sinusitis: Care Instructions  Your Care Instructions    Sinusitis is an infection of the lining of the sinus cavities in your head. Sinusitis often follows a cold. It causes pain and pressure in your head and face. In most cases, sinusitis gets better on its own in 1 to 2 weeks. But some mild symptoms may last for several weeks. Sometimes antibiotics are needed. Follow-up care is a key part of your treatment and safety. Be sure to make and go to all appointments, and call your doctor if you are having problems. It's also a good idea to know your test results and keep a list of the medicines you take. How can you care for yourself at home? · Take an over-the-counter pain medicine, such as acetaminophen (Tylenol), ibuprofen (Advil, Motrin), or naproxen (Aleve). Read and follow all instructions on the label. · If the doctor prescribed antibiotics, take them as directed. Do not stop taking them just because you feel better. You need to take the full course of antibiotics. · Be careful when taking over-the-counter cold or flu medicines and Tylenol at the same time. Many of these medicines have acetaminophen, which is Tylenol. Read the labels to make sure that you are not taking more than the recommended dose. Too much acetaminophen (Tylenol) can be harmful. · Breathe warm, moist air from a steamy shower, a hot bath, or a sink filled with hot water. Avoid cold, dry air. Using a humidifier in your home may help. Follow the directions for cleaning the machine. · Use saline (saltwater) nasal washes to help keep your nasal passages open and wash out mucus and bacteria. You can buy saline nose drops at a grocery store or drugstore. Or you can make your own at home by adding 1 teaspoon of salt and 1 teaspoon of baking soda to 2 cups of distilled water. If you make your own, fill a bulb syringe with the solution, insert the tip into your nostril, and squeeze gently. Orma Samia your nose.   · Put a hot, wet towel or a warm gel pack on your face 3 or 4 times a day for 5 to 10 minutes each time. · Try a decongestant nasal spray like oxymetazoline (Afrin). Do not use it for more than 3 days in a row. Using it for more than 3 days can make your congestion worse. When should you call for help? Call your doctor now or seek immediate medical care if:    · You have new or worse swelling or redness in your face or around your eyes.     · You have a new or higher fever.    Watch closely for changes in your health, and be sure to contact your doctor if:    · You have new or worse facial pain.     · The mucus from your nose becomes thicker (like pus) or has new blood in it.     · You are not getting better as expected. Where can you learn more? Go to http://cirilo-chela.info/. Enter X316 in the search box to learn more about \"Sinusitis: Care Instructions. \"  Current as of: October 21, 2018  Content Version: 12.2  © 6252-8669 [employer]. Care instructions adapted under license by [employer] (which disclaims liability or warranty for this information). If you have questions about a medical condition or this instruction, always ask your healthcare professional. [address] any warranty or liability for your use of this information.

## 2019-11-20 NOTE — ED PROVIDER NOTES
Presents with cough and congestion for the past 3 days getting worse. Having green sputum from cough and nose. States will get this once a year with a sinus infection. Off work today to come here. The history is provided by the patient. No  was used. Cough   This is a new problem. The current episode started more than 2 days ago. The problem occurs constantly. The problem has been gradually worsening. The cough is productive of sputum. There has been no fever. Associated symptoms include rhinorrhea. Pertinent negatives include no chest pain, no chills, no sweats, no eye redness, no ear pain, no headaches, no sore throat, no myalgias, no shortness of breath, no wheezing, no nausea and no vomiting. She has tried nothing for the symptoms. Her past medical history is significant for asthma. Nasal Congestion   Pertinent negatives include no chest pain, no abdominal pain, no headaches and no shortness of breath.         Past Medical History:   Diagnosis Date    Asthma     Allergy-Induced    Pain involving joint of finger of left hand     Perennial allergic rhinitis with seasonal variation     Type 1 diabetes mellitus ([facility] [address]        Past Surgical History:   Procedure Laterality Date    HX  SECTION      HX TYMPANOSTOMY      HX WISDOM TEETH EXTRACTION           Family History:   Problem Relation Age of Onset    Diabetes Father         Type 1    Heart Attack Father 32    Cancer Maternal Grandfather         Lung    Heart Disease Maternal Grandfather     Heart Disease Paternal Grandfather     No Known Problems Mother        Social History     Socioeconomic History    Marital status: SINGLE     Spouse name: Not on file    Number of children: Not on file    Years of education: Not on file    Highest education level: Not on file   Occupational History    Not on file   Social Needs    Financial resource strain: Not on file    Food insecurity:     Worry: Not on file Inability: Not on file    Transportation needs:     Medical: Not on file     Non-medical: Not on file   Tobacco Use    Smoking status: Never Smoker    Smokeless tobacco: Never Used   Substance and Sexual Activity    Alcohol use: No    Drug use: No    Sexual activity: Yes     Partners: Male   Lifestyle    Physical activity:     Days per week: Not on file     Minutes per session: Not on file    Stress: Not on file   Relationships    Social connections:     Talks on phone: Not on file     Gets together: Not on file     Attends Catholic service: Not on file     Active member of club or organization: Not on file     Attends meetings of clubs or organizations: Not on file     Relationship status: Not on file    Intimate partner violence:     Fear of current or ex partner: Not on file     Emotionally abused: Not on file     Physically abused: Not on file     Forced sexual activity: Not on file   Other Topics Concern    Not on file   Social History Narrative    Not on file         ALLERGIES: Mint and Percocet [oxycodone-acetaminophen]    Review of Systems   Constitutional: Negative for chills and fever. HENT: Positive for congestion and rhinorrhea. Negative for ear pain and sore throat. Eyes: Negative for pain and redness. Respiratory: Positive for cough. Negative for chest tightness, shortness of breath and wheezing. Cardiovascular: Negative for chest pain and leg swelling. Gastrointestinal: Negative for abdominal pain, diarrhea, nausea and vomiting. Musculoskeletal: Negative for back pain, gait problem, myalgias, neck pain and neck stiffness. Skin: Negative for color change and rash. Neurological: Negative for weakness, numbness and headaches. Vitals:    11/20/19 0702 11/20/19 0703 11/20/19 0704 11/20/19 0705   BP:    141/66   Pulse:    86   Resp:    16   Temp:    98.3 °F (36.8 °C)   SpO2: 99% 99% 100% 98%            Physical Exam  Constitutional:       Appearance: Normal appearance. She is well-developed. HENT:      Head: Normocephalic and atraumatic. Nose: Congestion and rhinorrhea present. Neck:      Musculoskeletal: Normal range of motion and neck supple. Cardiovascular:      Rate and Rhythm: Normal rate and regular rhythm. Pulmonary:      Effort: Pulmonary effort is normal.      Breath sounds: Normal breath sounds. No wheezing. Abdominal:      General: Bowel sounds are normal.      Palpations: Abdomen is soft. Tenderness: There is no tenderness. Musculoskeletal: Normal range of motion. General: No tenderness. Lymphadenopathy:      Cervical: No cervical adenopathy. Skin:     General: Skin is warm and dry. Neurological:      General: No focal deficit present. Mental Status: She is alert and oriented to person, place, and time. MDM  Number of Diagnoses or Management Options  Diagnosis management comments: We will treat as sinusitis and discharge. Amount and/or Complexity of Data Reviewed  Tests in the radiology section of CPT®: ordered and reviewed    Patient Progress  Patient progress: stable         Procedures      XR CHEST PA LAT (Final result)   Result time 11/20/19 07:50:19   Final result by Pavel Trevizo MD (11/20/19 07:50:19)                Impression:    IMPRESSION: Negative for acute abnormality. Narrative:    CHEST X-RAY, 2 views. HISTORY:  Sinus congestion. TECHNIQUE: PA and lateral views. COMPARISON: None. FINDINGS:   *Lungs: are clear. *Heart size: is normal.   *Costophrenic angles: are sharp. *Pulmonary vasculature: is unremarkable. *Included portion of the upper abdomen: is unremarkable. *Bones: No gross bony lesions. *Other: None.

## 2021-01-25 ENCOUNTER — HOSPITAL ENCOUNTER (EMERGENCY)
Age: 41
Discharge: HOME | End: 2021-01-25
Attending: EMERGENCY MEDICINE | Admitting: EMERGENCY MEDICINE
Payer: COMMERCIAL

## 2021-01-25 VITALS
HEIGHT: 64 IN | OXYGEN SATURATION: 100 % | DIASTOLIC BLOOD PRESSURE: 62 MMHG | RESPIRATION RATE: 16 BRPM | WEIGHT: 147 LBS | HEART RATE: 70 BPM | TEMPERATURE: 98.3 F | BODY MASS INDEX: 25.1 KG/M2 | SYSTOLIC BLOOD PRESSURE: 117 MMHG

## 2021-01-25 DIAGNOSIS — T78.40XA ALLERGIC REACTION, INITIAL ENCOUNTER: Primary | ICD-10-CM

## 2021-01-25 PROCEDURE — 74011250636 HC RX REV CODE- 250/636: Performed by: EMERGENCY MEDICINE

## 2021-01-25 PROCEDURE — 99283 EMERGENCY DEPT VISIT LOW MDM: CPT

## 2021-01-25 PROCEDURE — 74011000250 HC RX REV CODE- 250: Performed by: EMERGENCY MEDICINE

## 2021-01-25 PROCEDURE — 96372 THER/PROPH/DIAG INJ SC/IM: CPT

## 2021-01-25 PROCEDURE — 74011250637 HC RX REV CODE- 250/637: Performed by: EMERGENCY MEDICINE

## 2021-01-25 RX ORDER — FAMOTIDINE 20 MG/1
20 TABLET, FILM COATED ORAL
Status: COMPLETED | OUTPATIENT
Start: 2021-01-25 | End: 2021-01-25

## 2021-01-25 RX ORDER — LIDOCAINE HYDROCHLORIDE 20 MG/ML
15 SOLUTION OROPHARYNGEAL
Status: COMPLETED | OUTPATIENT
Start: 2021-01-25 | End: 2021-01-25

## 2021-01-25 RX ORDER — EPINEPHRINE 1 MG/ML
0.3 INJECTION, SOLUTION, CONCENTRATE INTRAVENOUS ONCE
Status: COMPLETED | OUTPATIENT
Start: 2021-01-25 | End: 2021-01-25

## 2021-01-25 RX ORDER — HYDROXYZINE 25 MG/1
25-50 TABLET, FILM COATED ORAL
Qty: 24 TAB | Refills: 0 | Status: SHIPPED | OUTPATIENT
Start: 2021-01-25 | End: 2021-01-30

## 2021-01-25 RX ADMIN — LIDOCAINE HYDROCHLORIDE 15 ML: 20 SOLUTION ORAL; TOPICAL at 11:23

## 2021-01-25 RX ADMIN — FAMOTIDINE 20 MG: 20 TABLET, FILM COATED ORAL at 11:23

## 2021-01-25 RX ADMIN — EPINEPHRINE 0.3 MG: 1 INJECTION, SOLUTION, CONCENTRATE INTRAVENOUS at 11:23

## 2021-01-25 NOTE — ED TRIAGE NOTES
Patient advises that she was placed on bactrim last week and woke up this morning with rash and swelling to lips, Patient advises she was seen at Urgent care this morning and given steroid shot however can not remember what it was. Also advised to take 50 mg benadryl at that time and told if she did not feel better in about 2 hours to go to ED. Mask on during triage. Patient advises that she feels funny in her throat and continues to itch. Talking in complete sentences at this time.

## 2021-01-25 NOTE — ED NOTES
I have reviewed discharge instructions with the patient.  The patient verbalized understanding.    Patient left ED via Discharge Method: ambulatory to Home with self.    Opportunity for questions and clarification provided.       Patient given 1 scripts.         To continue your aftercare when you leave the hospital, you may receive an automated call from our care team to check in on how you are doing.  This is a free service and part of our promise to provide the best care and service to meet your aftercare needs.” If you have questions, or wish to unsubscribe from this service please call [phone].  Thank you for Choosing our [facility] Emergency Department.

## 2021-01-25 NOTE — DISCHARGE INSTRUCTIONS
Daily pepcid till resolved  Atarax, for  stronger antihistamine, than benadryl  Tomorrow morning Start the prednisone 2 a day for a few days till improving  Watch sugars closely

## 2021-01-26 NOTE — ED PROVIDER NOTES
Chief complaint : allergic reaction    HISTORY OF PRESENT ILLNESS :  Location : diffuse rash to whole body    Quality : red and itchy, with burning to lips and throat    Quantity : constant    Timing : this am    Severity : moderate    Context : started bactrim 21    Alleviating / exacerbating factors : sleepy after benadryl, but no better, also got kenalog im at urgent care    Associated Symptoms : no dyspnea/wheezing, no ent swelling             Past Medical History:   Diagnosis Date    Asthma     Allergy-Induced    Pain involving joint of finger of left hand     Perennial allergic rhinitis with seasonal variation     Type 1 diabetes mellitus ([facility] [address]        Past Surgical History:   Procedure Laterality Date    HX  SECTION      HX TYMPANOSTOMY      HX WISDOM TEETH EXTRACTION           Family History:   Problem Relation Age of Onset    Diabetes Father         Type 1    Heart Attack Father 32    Cancer Maternal Grandfather         Lung    Heart Disease Maternal Grandfather     Heart Disease Paternal Grandfather     No Known Problems Mother        Social History     Socioeconomic History    Marital status: SINGLE     Spouse name: Not on file    Number of children: Not on file    Years of education: Not on file    Highest education level: Not on file   Occupational History    Not on file   Social Needs    Financial resource strain: Not on file    Food insecurity     Worry: Not on file     Inability: Not on file    Transportation needs     Medical: Not on file     Non-medical: Not on file   Tobacco Use    Smoking status: Never Smoker    Smokeless tobacco: Never Used   Substance and Sexual Activity    Alcohol use: No    Drug use: No    Sexual activity: Yes     Partners: Male   Lifestyle    Physical activity     Days per week: Not on file     Minutes per session: Not on file    Stress: Not on file   Relationships    Social connections     Talks on phone: Not on file     Gets together: Not on file     Attends Mandaen service: Not on file     Active member of club or organization: Not on file     Attends meetings of clubs or organizations: Not on file     Relationship status: Not on file    Intimate partner violence     Fear of current or ex partner: Not on file     Emotionally abused: Not on file     Physically abused: Not on file     Forced sexual activity: Not on file   Other Topics Concern    Not on file   Social History Narrative    Not on file         ALLERGIES: Bactrim [sulfamethoprim], Mint, and Percocet [oxycodone-acetaminophen]    Review of Systems   Constitutional: Negative for chills and fever. HENT: Positive for sore throat. Negative for congestion, rhinorrhea and trouble swallowing. Eyes: Negative for discharge and redness. Respiratory: Negative for cough, shortness of breath, wheezing and stridor. Cardiovascular: Negative for chest pain and palpitations. Gastrointestinal: Negative for abdominal pain, diarrhea, nausea and vomiting. Musculoskeletal: Negative for arthralgias and back pain. Skin: Positive for rash. Neurological: Negative for dizziness and headaches. All other systems reviewed and are negative. Vitals:    01/25/21 1008 01/25/21 1127 01/25/21 1201   BP: 124/73  117/62   Pulse: 82  70   Resp: 16  16   Temp: 98.3 °F (36.8 °C)     SpO2: 100% 99% 100%   Weight: 66.7 kg (147 lb)     Height: 5' 4\" (1.626 m)              Physical Exam  Vitals signs and nursing note reviewed. Constitutional:       General: She is not in acute distress. Appearance: Normal appearance. She is well-developed. She is not ill-appearing, toxic-appearing or diaphoretic. HENT:      Head: Normocephalic and atraumatic. Right Ear: External ear normal.      Left Ear: External ear normal.      Mouth/Throat:      Mouth: Mucous membranes are moist.      Pharynx: Oropharynx is clear. No oropharyngeal exudate or posterior oropharyngeal erythema.    Eyes: General:         Right eye: No discharge. Left eye: No discharge. Extraocular Movements: Extraocular movements intact. Conjunctiva/sclera: Conjunctivae normal.      Pupils: Pupils are equal, round, and reactive to light. Neck:      Musculoskeletal: Normal range of motion and neck supple. Cardiovascular:      Rate and Rhythm: Normal rate and regular rhythm. Pulmonary:      Effort: Pulmonary effort is normal. No respiratory distress. Breath sounds: Normal breath sounds. No stridor. No wheezing, rhonchi or rales. Musculoskeletal: Normal range of motion. Skin:     General: Skin is warm and dry. Findings: Erythema and rash present. Neurological:      General: No focal deficit present. Mental Status: She is alert and oriented to person, place, and time. Mental status is at baseline. Motor: No abnormal muscle tone. Comments: cni 2-12 grossly  Nl gait,  Nl speech     Psychiatric:         Mood and Affect: Mood normal.         Behavior: Behavior normal.          MDM  Number of Diagnoses or Management Options  Allergic reaction, initial encounter: new and does not require workup  Diagnosis management comments: Medical decision making note: Allergic rash to bactrim  One dose epi here  C/w prednisone as rx'd (start tomorrow)  Add pepcid, and change benadryl to atarax  This concludes the \"medical decision making note\" part of this emergency department visit note.       Risk of Complications, Morbidity, and/or Mortality  Presenting problems: low  Diagnostic procedures: minimal  Management options: low    Patient Progress  Patient progress: improved         Procedures

## 2021-03-08 LAB — HBA1C MFR BLD HPLC: 7.4 %

## 2021-03-23 PROBLEM — Z96.41 INSULIN PUMP IN PLACE: Status: ACTIVE | Noted: 2021-03-23

## 2021-03-23 PROBLEM — M77.8 TENDINITIS OF RIGHT HAND: Status: ACTIVE | Noted: 2020-09-02

## 2021-05-03 ENCOUNTER — HOSPITAL ENCOUNTER (EMERGENCY)
Age: 41
Discharge: HOME | End: 2021-05-03
Attending: EMERGENCY MEDICINE
Payer: OTHER MISCELLANEOUS

## 2021-05-03 VITALS
HEIGHT: 66 IN | RESPIRATION RATE: 16 BRPM | BODY MASS INDEX: 24.11 KG/M2 | HEART RATE: 85 BPM | WEIGHT: 150 LBS | OXYGEN SATURATION: 100 % | SYSTOLIC BLOOD PRESSURE: 131 MMHG | TEMPERATURE: 98.4 F | DIASTOLIC BLOOD PRESSURE: 61 MMHG

## 2021-05-03 DIAGNOSIS — S61.217A LACERATION OF LEFT LITTLE FINGER WITHOUT FOREIGN BODY WITHOUT DAMAGE TO NAIL, INITIAL ENCOUNTER: Primary | ICD-10-CM

## 2021-05-03 PROCEDURE — 75810000293 HC SIMP/SUPERF WND  RPR

## 2021-05-03 PROCEDURE — 99283 EMERGENCY DEPT VISIT LOW MDM: CPT

## 2021-05-03 NOTE — ED TRIAGE NOTES
Pt to ED with c/o left middle finger laceration. Pt cut it at work opening a bag of cheese. Pt states she has had a tetanus vaccine within the last 3 years. Masked.

## 2021-05-03 NOTE — ED PROVIDER NOTES
Patient to ER status post laceration to the left fifth finger. She was cutting open container cheese at work she is right-handed she is up-to-date on tetanus    The history is provided by the patient. Laceration   The incident occurred 1 to 2 hours ago. Pain location: left 5th finger. The laceration is 1 cm in size. The injury mechanism is a clean knife. Foreign body present: no. The pain is at a severity of 2/10. The pain is mild. The pain has been constant since onset. Pertinent negatives include no numbness and no coolness. The patient's last tetanus shot was less than 5 years ago.        Past Medical History:   Diagnosis Date    Asthma     Allergy-Induced    Pain involving joint of finger of left hand     Perennial allergic rhinitis with seasonal variation     Type 1 diabetes mellitus ([facility] [address]        Past Surgical History:   Procedure Laterality Date    HX  SECTION      HX TYMPANOSTOMY      HX WISDOM TEETH EXTRACTION           Family History:   Problem Relation Age of Onset    Diabetes Father         Type 1    Heart Attack Father 32    Cancer Maternal Grandfather         Lung    Heart Disease Maternal Grandfather     Heart Disease Paternal Grandfather     No Known Problems Mother        Social History     Socioeconomic History    Marital status: SINGLE     Spouse name: Not on file    Number of children: Not on file    Years of education: Not on file    Highest education level: Not on file   Occupational History    Not on file   Social Needs    Financial resource strain: Not on file    Food insecurity     Worry: Not on file     Inability: Not on file    Transportation needs     Medical: Not on file     Non-medical: Not on file   Tobacco Use    Smoking status: Never Smoker    Smokeless tobacco: Never Used   Substance and Sexual Activity    Alcohol use: No    Drug use: No    Sexual activity: Yes     Partners: Male   Lifestyle    Physical activity     Days per week: Not on file Minutes per session: Not on file    Stress: Not on file   Relationships    Social connections     Talks on phone: Not on file     Gets together: Not on file     Attends Bahai service: Not on file     Active member of club or organization: Not on file     Attends meetings of clubs or organizations: Not on file     Relationship status: Not on file    Intimate partner violence     Fear of current or ex partner: Not on file     Emotionally abused: Not on file     Physically abused: Not on file     Forced sexual activity: Not on file   Other Topics Concern    Not on file   Social History Narrative    Not on file         ALLERGIES: Bactrim [sulfamethoprim], Mint, and Percocet [oxycodone-acetaminophen]    Review of Systems   Neurological: Negative for numbness. All other systems reviewed and are negative. Vitals:    05/03/21 0947   BP: 137/66   Pulse: 84   Resp: 20   Temp: 98.5 °F (36.9 °C)   SpO2: 100%   Weight: 68 kg (150 lb)   Height: 5' 6\" (1.676 m)            Physical Exam  Vitals signs and nursing note reviewed. Constitutional:       General: She is not in acute distress. Appearance: Normal appearance. She is well-developed and normal weight. She is not diaphoretic. HENT:      Head: Normocephalic and atraumatic. Eyes:      Pupils: Pupils are equal, round, and reactive to light. Neck:      Musculoskeletal: Normal range of motion and neck supple. Cardiovascular:      Rate and Rhythm: Normal rate and regular rhythm. Pulmonary:      Effort: Pulmonary effort is normal.      Breath sounds: Normal breath sounds. Abdominal:      General: Bowel sounds are normal.      Palpations: Abdomen is soft. Musculoskeletal: Normal range of motion. General: Signs of injury present. Comments: Left fifth finger with 0.5 cm laceration over PIP joint. No active bleeding. Full range of motion of the PIP joint. Sensation intact distally. Skin:     General: Skin is warm.    Neurological: General: No focal deficit present. Mental Status: She is alert and oriented to person, place, and time. Psychiatric:         Mood and Affect: Mood normal.         Behavior: Behavior normal.          MDM  Number of Diagnoses or Management Options  Diagnosis management comments: Lac to left 5th finger with 2 suture repair  Keep area clean keep covered while at work, sutures to be removed in 9 to 10 days, may return to ER or work well. Amount and/or Complexity of Data Reviewed  Review and summarize past medical records: yes    Risk of Complications, Morbidity, and/or Mortality  Presenting problems: low  Diagnostic procedures: low  Management options: low    Patient Progress  Patient progress: improved         Wound Repair    Date/Time: 5/3/2021 10:34 AM  Performed by: [id number] [facility] provider: Gil  Preparation: skin prepped with Betadine  Pre-procedure re-eval: Immediately prior to the procedure, the patient was reevaluated and found suitable for the planned procedure and any planned medications. Time out: Immediately prior to the procedure a time out was called to verify the correct patient, procedure, equipment, staff and marking as appropriate. .  Location details: left small finger  Wound length:2.5 cm or less  Anesthesia: local infiltration    Anesthesia:  Local Anesthetic: lidocaine 1% without epinephrine  Anesthetic total: 2 mL  Foreign bodies: no foreign bodies  Debridement: none  Skin closure: 4-0 nylon  Number of sutures: 2  Technique: simple and interrupted  Approximation: close  Dressing: antibiotic ointment and 4x4  My total time at bedside, performing this procedure was 1-15 minutes.

## 2021-05-03 NOTE — ED NOTES
I have reviewed discharge instructions with the patient. The patient verbalized understanding. Patient left ED via Discharge Method: ambulatory to Home with (self). Opportunity for questions and clarification provided. Patient given 0 scripts. To continue your aftercare when you leave the hospital, you may receive an automated call from our care team to check in on how you are doing. This is a free service and part of our promise to provide the best care and service to meet your aftercare needs.  If you have questions, or wish to unsubscribe from this service please call [phone]. Thank you for Choosing our [address] [place] [address] Emergency Department.

## 2021-05-03 NOTE — DISCHARGE INSTRUCTIONS
Keep area clean and covered may get wet but do not soak.   Sutures to be removed and 9 days watch for any signs of infection, use Tylenol Motrin for pain

## 2021-05-29 ENCOUNTER — HOSPITAL ENCOUNTER (EMERGENCY)
Age: 41
Discharge: HOME | End: 2021-05-29
Attending: EMERGENCY MEDICINE
Payer: COMMERCIAL

## 2021-05-29 VITALS
HEIGHT: 64 IN | RESPIRATION RATE: 18 BRPM | HEART RATE: 74 BPM | WEIGHT: 145 LBS | DIASTOLIC BLOOD PRESSURE: 77 MMHG | SYSTOLIC BLOOD PRESSURE: 145 MMHG | OXYGEN SATURATION: 94 % | BODY MASS INDEX: 24.75 KG/M2 | TEMPERATURE: 98.7 F

## 2021-05-29 DIAGNOSIS — S71.111A LACERATION OF SKIN OF RIGHT THIGH, INITIAL ENCOUNTER: Primary | ICD-10-CM

## 2021-05-29 PROCEDURE — 75810000293 HC SIMP/SUPERF WND  RPR

## 2021-05-29 PROCEDURE — 99282 EMERGENCY DEPT VISIT SF MDM: CPT

## 2021-05-29 NOTE — ED NOTES
I have reviewed discharge instructions with the patient. The patient verbalized understanding. Patient left ED via Discharge Method: ambulatory to Home with family. Opportunity for questions and clarification provided. Patient given 0 scripts. To continue your aftercare when you leave the hospital, you may receive an automated call from our care team to check in on how you are doing. This is a free service and part of our promise to provide the best care and service to meet your aftercare needs.  If you have questions, or wish to unsubscribe from this service please call [phone]. Thank you for Choosing our [place] [facility].

## 2021-05-29 NOTE — ED PROVIDER NOTES
61-year-old female presents to emergency room with chief complaint of laceration/abrasion to her right thigh. She states she was working with a  when the  fell off a ledge scraping her leg in the process. She is up-to-date with tetanus last receiving Tdap approximately 1 year ago. Past Medical History:   Diagnosis Date    Asthma     Allergy-Induced    Pain involving joint of finger of left hand     Perennial allergic rhinitis with seasonal variation     Type 1 diabetes mellitus ([facility] [address]        Past Surgical History:   Procedure Laterality Date    HX  SECTION      HX TYMPANOSTOMY      HX WISDOM TEETH EXTRACTION           Family History:   Problem Relation Age of Onset    Diabetes Father         Type 1    Heart Attack Father 32    Cancer Maternal Grandfather         Lung    Heart Disease Maternal Grandfather     Heart Disease Paternal Grandfather     No Known Problems Mother        Social History     Socioeconomic History    Marital status: SINGLE     Spouse name: Not on file    Number of children: Not on file    Years of education: Not on file    Highest education level: Not on file   Occupational History    Not on file   Tobacco Use    Smoking status: Never Smoker    Smokeless tobacco: Never Used   Vaping Use    Vaping Use: Never used   Substance and Sexual Activity    Alcohol use: No    Drug use: No    Sexual activity: Yes     Partners: Male   Other Topics Concern    Not on file   Social History Narrative    Not on file     Social Determinants of Health     Financial Resource Strain:     Difficulty of Paying Living Expenses:    Food Insecurity:     Worried About Running Out of Food in the Last Year:     Ran Out of Food in the Last Year:    Transportation Needs:     Lack of Transportation (Medical):      Lack of Transportation (Non-Medical):    Physical Activity:     Days of Exercise per Week:     Minutes of Exercise per Session: Stress:     Feeling of Stress :    Social Connections:     Frequency of Communication with Friends and Family:     Frequency of Social Gatherings with Friends and Family:     Attends Baptism Services:     Active Member of Clubs or Organizations:     Attends Club or Organization Meetings:     Marital Status:    Intimate Partner Violence:     Fear of Current or Ex-Partner:     Emotionally Abused:     Physically Abused:     Sexually Abused: ALLERGIES: Bactrim [sulfamethoprim], Mint, and Percocet [oxycodone-acetaminophen]    Review of Systems   Constitutional: Negative for chills and fever. HENT: Negative for facial swelling. Respiratory: Negative for chest tightness and shortness of breath. Cardiovascular: Negative for chest pain. Gastrointestinal: Negative for abdominal pain, nausea and vomiting. Musculoskeletal: Negative for myalgias. Skin: Positive for wound. Neurological: Negative for headaches. Psychiatric/Behavioral: Negative for confusion. Vitals:    05/29/21 1331   BP: (!) 145/77   Pulse: 74   Resp: 18   Temp: 98.7 °F (37.1 °C)   SpO2: 94%   Weight: 65.8 kg (145 lb)   Height: 5' 4\" (1.626 m)            Physical Exam  Vitals reviewed. Constitutional:       Appearance: Normal appearance. HENT:      Head: Normocephalic and atraumatic. Mouth/Throat:      Mouth: Mucous membranes are moist.   Eyes:      Pupils: Pupils are equal, round, and reactive to light. Cardiovascular:      Rate and Rhythm: Normal rate and regular rhythm. Pulmonary:      Effort: No respiratory distress. Breath sounds: Normal breath sounds. Abdominal:      Palpations: Abdomen is soft. Tenderness: There is no abdominal tenderness. There is no guarding. Skin:     General: Skin is warm and dry. Neurological:      Mental Status: She is alert and oriented to person, place, and time. Mental status is at baseline.    Psychiatric:         Mood and Affect: Mood normal. MDM  Number of Diagnoses or Management Options  Diagnosis management comments: Laceration. By sutures, see procedure note. Patient tolerated procedure well. Advised patient return the emergency room in 7 to 10 days for suture murmur, keep wound dry for the next 48 hours. Patient verbalized understanding. Risk of Complications, Morbidity, and/or Mortality  Presenting problems: low  Diagnostic procedures: low  Management options: low    Patient Progress  Patient progress: improved         Wound Closure by Adhesive    Date/Time: 5/29/2021 2:02 PM  Performed by: KEENAN Varghese  Authorized by: KEENAN Varghese     Consent:     Consent obtained:  Verbal    Consent given by:  Patient  Anesthesia (see MAR for exact dosages):      Anesthesia method:  Local infiltration    Local anesthetic:  Lidocaine 1% w/o epi  Laceration details:     Location:  Leg    Leg location:  R upper leg    Length (cm):  2  Repair type:     Repair type:  Simple  Exploration:     Hemostasis achieved with:  Direct pressure    Wound exploration: wound explored through full range of motion and entire depth of wound probed and visualized      Wound extent: no areolar tissue violation noted, no fascia violation noted, no foreign bodies/material noted, no muscle damage noted, no nerve damage noted, no tendon damage noted, no underlying fracture noted and no vascular damage noted    Treatment:     Area cleansed with:  Saline    Amount of cleaning:  Standard    Irrigation solution:  Sterile water    Irrigation method:  Syringe  Skin repair:     Repair method:  Sutures    Suture size:  5-0    Suture material:  Nylon    Suture technique:  Simple interrupted    Number of sutures:  2  Approximation:     Approximation:  Close

## 2021-12-20 ENCOUNTER — APPOINTMENT (OUTPATIENT)
Dept: GENERAL RADIOLOGY | Age: 41
End: 2021-12-20
Attending: PHYSICIAN ASSISTANT
Payer: OTHER MISCELLANEOUS

## 2021-12-20 ENCOUNTER — HOSPITAL ENCOUNTER (EMERGENCY)
Age: 41
Discharge: HOME | End: 2021-12-20
Attending: EMERGENCY MEDICINE
Payer: OTHER MISCELLANEOUS

## 2021-12-20 VITALS
OXYGEN SATURATION: 100 % | DIASTOLIC BLOOD PRESSURE: 75 MMHG | WEIGHT: 150 LBS | HEART RATE: 80 BPM | RESPIRATION RATE: 18 BRPM | HEIGHT: 64 IN | TEMPERATURE: 98.5 F | SYSTOLIC BLOOD PRESSURE: 136 MMHG | BODY MASS INDEX: 25.61 KG/M2

## 2021-12-20 DIAGNOSIS — M79.641 RIGHT HAND PAIN: Primary | ICD-10-CM

## 2021-12-20 PROCEDURE — 73130 X-RAY EXAM OF HAND: CPT

## 2021-12-20 PROCEDURE — 99282 EMERGENCY DEPT VISIT SF MDM: CPT

## 2021-12-20 NOTE — DISCHARGE INSTRUCTIONS
Your x-ray was negative for any acute bony fractures. Continue to ice, use Tylenol and ibuprofen as needed for pain relief.

## 2021-12-20 NOTE — ED PROVIDER NOTES
While at work approximately 30 minutes ago, had a minor crush injury to right hand. Was working on a  when it became unhinged and fell backwards onto her hand hitting between the metal counter. Has had some pain since event, no Tylenol or ibuprofen or ice at home. Pain is worse with palpation. Alleviated by immobilization. No other complaints. Past Medical History:   Diagnosis Date    Asthma     Allergy-Induced    Pain involving joint of finger of left hand     Perennial allergic rhinitis with seasonal variation     Type 1 diabetes mellitus ([facility] [address]        Past Surgical History:   Procedure Laterality Date    HX  SECTION      HX TYMPANOSTOMY      HX WISDOM TEETH EXTRACTION           Family History:   Problem Relation Age of Onset    Diabetes Father         Type 1    Heart Attack Father 32    Cancer Maternal Grandfather         Lung    Heart Disease Maternal Grandfather     Heart Disease Paternal Grandfather     No Known Problems Mother        Social History     Socioeconomic History    Marital status: SINGLE     Spouse name: Not on file    Number of children: Not on file    Years of education: Not on file    Highest education level: Not on file   Occupational History    Not on file   Tobacco Use    Smoking status: Never Smoker    Smokeless tobacco: Never Used   Vaping Use    Vaping Use: Never used   Substance and Sexual Activity    Alcohol use: No    Drug use: No    Sexual activity: Yes     Partners: Male   Other Topics Concern    Not on file   Social History Narrative    Not on file     Social Determinants of Health     Financial Resource Strain:     Difficulty of Paying Living Expenses: Not on file   Food Insecurity:     Worried About Running Out of Food in the Last Year: Not on file    Awilda of Food in the Last Year: Not on file   Transportation Needs:     Lack of Transportation (Medical): Not on file    Lack of Transportation (Non-Medical):  Not on file   Physical Activity:     Days of Exercise per Week: Not on file    Minutes of Exercise per Session: Not on file   Stress:     Feeling of Stress : Not on file   Social Connections:     Frequency of Communication with Friends and Family: Not on file    Frequency of Social Gatherings with Friends and Family: Not on file    Attends Christianity Services: Not on file    Active Member of [address] [place] [address] or Organizations: Not on file    Attends Club or Organization Meetings: Not on file    Marital Status: Not on file   Intimate Partner Violence:     Fear of Current or Ex-Partner: Not on file    Emotionally Abused: Not on file    Physically Abused: Not on file    Sexually Abused: Not on file   Housing Stability:     Unable to Pay for Housing in the Last Year: Not on file    Number of [place] in the Last Year: Not on file    Unstable Housing in the Last Year: Not on file         ALLERGIES: Bactrim [sulfamethoprim], Mint, and Percocet [oxycodone-acetaminophen]    Review of Systems   Constitutional: Negative for chills and fever. HENT: Negative for sore throat. Cardiovascular: Negative for chest pain. Gastrointestinal: Negative for nausea and vomiting. Genitourinary: Negative for flank pain. Musculoskeletal: Positive for arthralgias. Negative for back pain. All other systems reviewed and are negative. There were no vitals filed for this visit. Physical Exam  Vitals and nursing note reviewed. Constitutional:       General: She is not in acute distress. Appearance: Normal appearance. She is normal weight. She is not ill-appearing, toxic-appearing or diaphoretic. HENT:      Head: Normocephalic. Mouth/Throat:      Mouth: Mucous membranes are moist.   Eyes:      Pupils: Pupils are equal, round, and reactive to light. Cardiovascular:      Rate and Rhythm: Normal rate. Pulmonary:      Effort: Pulmonary effort is normal.   Abdominal:      General: Abdomen is flat. Palpations: Abdomen is soft. Musculoskeletal:      Comments: Mild pain over the first phalanges of the right hand. No visible ecchymosis, bruising or swelling   Neurological:      Mental Status: She is alert. MDM  Number of Diagnoses or Management Options  Right hand pain  Diagnosis management comments: X-ray negative. Follow-up with primary care physician.        Amount and/or Complexity of Data Reviewed  Clinical lab tests: ordered    Risk of Complications, Morbidity, and/or Mortality  Presenting problems: low  Diagnostic procedures: low  Management options: low    Patient Progress  Patient progress: improved         Procedures

## 2021-12-20 NOTE — ED NOTES
I have reviewed discharge instructions with the patient. The patient verbalized understanding. Patient left ED via Discharge Method: ambulatory to Home with (self). Opportunity for questions and clarification provided. Patient given 0 scripts. No esign         To continue your aftercare when you leave the hospital, you may receive an automated call from our care team to check in on how you are doing. This is a free service and part of our promise to provide the best care and service to meet your aftercare needs.  If you have questions, or wish to unsubscribe from this service please call [phone]. Thank you for Choosing our [facility] Emergency Department.

## 2022-03-03 ENCOUNTER — HOSPITAL ENCOUNTER (EMERGENCY)
Age: 42
Discharge: HOME | End: 2022-03-03
Attending: STUDENT IN AN ORGANIZED HEALTH CARE EDUCATION/TRAINING PROGRAM
Payer: COMMERCIAL

## 2022-03-03 ENCOUNTER — APPOINTMENT (OUTPATIENT)
Dept: ULTRASOUND IMAGING | Age: 42
End: 2022-03-03
Attending: STUDENT IN AN ORGANIZED HEALTH CARE EDUCATION/TRAINING PROGRAM
Payer: COMMERCIAL

## 2022-03-03 VITALS
TEMPERATURE: 98.3 F | RESPIRATION RATE: 18 BRPM | DIASTOLIC BLOOD PRESSURE: 72 MMHG | OXYGEN SATURATION: 100 % | BODY MASS INDEX: 25.61 KG/M2 | HEIGHT: 64 IN | HEART RATE: 99 BPM | WEIGHT: 150 LBS | SYSTOLIC BLOOD PRESSURE: 146 MMHG

## 2022-03-03 DIAGNOSIS — R10.32 ABDOMINAL PAIN, LLQ (LEFT LOWER QUADRANT): Primary | ICD-10-CM

## 2022-03-03 LAB
ALBUMIN SERPL-MCNC: 4.2 G/DL (ref 3.5–5)
ALBUMIN/GLOB SERPL: 1.2 {RATIO} (ref 1.2–3.5)
ALP SERPL-CCNC: 71 U/L (ref 50–130)
ALT SERPL-CCNC: 17 U/L (ref 12–65)
ANION GAP SERPL CALC-SCNC: 6 MMOL/L (ref 7–16)
APPEARANCE UR: CLEAR
AST SERPL-CCNC: 11 U/L (ref 15–37)
BACTERIA URNS QL MICRO: 0 /HPF
BASOPHILS # BLD: 0.1 K/UL (ref 0–0.2)
BASOPHILS NFR BLD: 1 % (ref 0–2)
BILIRUB SERPL-MCNC: 0.7 MG/DL (ref 0.2–1.1)
BILIRUB UR QL: NEGATIVE
BUN SERPL-MCNC: 17 MG/DL (ref 6–23)
CALCIUM SERPL-MCNC: 8.7 MG/DL (ref 8.3–10.4)
CHLORIDE SERPL-SCNC: 104 MMOL/L (ref 98–107)
CO2 SERPL-SCNC: 27 MMOL/L (ref 21–32)
COLOR UR: YELLOW
CREAT SERPL-MCNC: 0.84 MG/DL (ref 0.6–1)
DIFFERENTIAL METHOD BLD: NORMAL
EOSINOPHIL # BLD: 0.2 K/UL (ref 0–0.8)
EOSINOPHIL NFR BLD: 2 % (ref 0.5–7.8)
ERYTHROCYTE [DISTWIDTH] IN BLOOD BY AUTOMATED COUNT: 12 % (ref 11.9–14.6)
GLOBULIN SER CALC-MCNC: 3.4 G/DL (ref 2.3–3.5)
GLUCOSE SERPL-MCNC: 197 MG/DL (ref 65–100)
GLUCOSE UR STRIP.AUTO-MCNC: NEGATIVE MG/DL
HCG UR QL: NEGATIVE
HCT VFR BLD AUTO: 40.9 % (ref 35.8–46.3)
HGB BLD-MCNC: 13.6 G/DL (ref 11.7–15.4)
HGB UR QL STRIP: NEGATIVE
IMM GRANULOCYTES # BLD AUTO: 0 K/UL (ref 0–0.5)
IMM GRANULOCYTES NFR BLD AUTO: 0 % (ref 0–5)
KETONES UR QL STRIP.AUTO: 80 MG/DL
LACTATE SERPL-SCNC: 0.8 MMOL/L (ref 0.4–2)
LEUKOCYTE ESTERASE UR QL STRIP.AUTO: NEGATIVE
LIPASE SERPL-CCNC: 41 U/L (ref 73–393)
LYMPHOCYTES # BLD: 1.8 K/UL (ref 0.5–4.6)
LYMPHOCYTES NFR BLD: 19 % (ref 13–44)
MCH RBC QN AUTO: 30.3 PG (ref 26.1–32.9)
MCHC RBC AUTO-ENTMCNC: 33.3 G/DL (ref 31.4–35)
MCV RBC AUTO: 91.1 FL (ref 79.6–97.8)
MONOCYTES # BLD: 0.8 K/UL (ref 0.1–1.3)
MONOCYTES NFR BLD: 8 % (ref 4–12)
NEUTS SEG # BLD: 6.6 K/UL (ref 1.7–8.2)
NEUTS SEG NFR BLD: 70 % (ref 43–78)
NITRITE UR QL STRIP.AUTO: NEGATIVE
NRBC # BLD: 0 K/UL (ref 0–0.2)
PH UR STRIP: 5.5 [PH] (ref 5–9)
PLATELET # BLD AUTO: 252 K/UL (ref 150–450)
PMV BLD AUTO: 9.9 FL (ref 9.4–12.3)
POTASSIUM SERPL-SCNC: 4 MMOL/L (ref 3.5–5.1)
PROT SERPL-MCNC: 7.6 G/DL (ref 6.3–8.2)
PROT UR STRIP-MCNC: NEGATIVE MG/DL
RBC # BLD AUTO: 4.49 M/UL (ref 4.05–5.2)
SODIUM SERPL-SCNC: 137 MMOL/L (ref 136–145)
SP GR UR REFRACTOMETRY: >1.03 (ref 1–1.02)
UROBILINOGEN UR QL STRIP.AUTO: 1 EU/DL (ref 0.2–1)
WBC # BLD AUTO: 9.4 K/UL (ref 4.3–11.1)

## 2022-03-03 PROCEDURE — 81003 URINALYSIS AUTO W/O SCOPE: CPT

## 2022-03-03 PROCEDURE — 81025 URINE PREGNANCY TEST: CPT

## 2022-03-03 PROCEDURE — 96360 HYDRATION IV INFUSION INIT: CPT

## 2022-03-03 PROCEDURE — 99284 EMERGENCY DEPT VISIT MOD MDM: CPT

## 2022-03-03 PROCEDURE — 74011250636 HC RX REV CODE- 250/636: Performed by: PHYSICIAN ASSISTANT

## 2022-03-03 PROCEDURE — 83605 ASSAY OF LACTIC ACID: CPT

## 2022-03-03 PROCEDURE — 80053 COMPREHEN METABOLIC PANEL: CPT

## 2022-03-03 PROCEDURE — 85025 COMPLETE CBC W/AUTO DIFF WBC: CPT

## 2022-03-03 PROCEDURE — 76856 US EXAM PELVIC COMPLETE: CPT

## 2022-03-03 PROCEDURE — 83690 ASSAY OF LIPASE: CPT

## 2022-03-03 RX ADMIN — SODIUM CHLORIDE 1000 ML: 900 INJECTION, SOLUTION INTRAVENOUS at 15:30

## 2022-03-03 NOTE — ED NOTES
Pt c/o left lower abdominal pain over the last 3 days. Pain is presently a 7 out of 10. She denies any fever, chills. She denies any nausea, vomiting or diarrhea. No urinary symptoms    Physical exam: Patient well-appearing in no apparent distress. Mild tenderness to palpation the left lower quadrant of the abdomen    Patient evaluated initially in triage. Rapid Medical Evaluation was conducted and necessary orders have been placed. I have performed a medical screening exam.  Care will now be transferred to the attending physician in the emergency department.   KEENAN Kohler 2:31 PM

## 2022-03-03 NOTE — ED PROVIDER NOTES
50-year-old female presents to the emerge department complaining of left lower quadrant abdominal tenderness. Patient's history of diabetes as well as prior  section. States pain in her lower left lower quadrant began 2 days ago. Reports symptoms radiates through to her back. States the pain is not severe but it is aggravating. Worse with movement. States palpation of the area causes tingling sensation lower into her groin and abdomen. Patient denies fever, chills, chest pain, shortness of breath, nausea, vomiting, diarrhea, dysuria, hematuria, vaginal discharge or vaginal bleeding. Patients last menstrual period was approximately 2 weeks ago. Denies history of similar symptoms.            Past Medical History:   Diagnosis Date    Asthma     Allergy-Induced    Pain involving joint of finger of left hand     Perennial allergic rhinitis with seasonal variation     [facility]        Past Surgical History:   Procedure Laterality Date    HX  SECTION      HX TYMPANOSTOMY      HX WISDOM TEETH EXTRACTION           Family History:   Problem Relation Age of Onset    Diabetes Father         Type 1    Heart Attack Father 32    Cancer Maternal Grandfather         Lung    Heart Disease Maternal Grandfather     Heart Disease Paternal Grandfather     No Known Problems Mother        Social History     Socioeconomic History    Marital status: SINGLE     Spouse name: Not on file    Number of children: Not on file    Years of education: Not on file    Highest education level: Not on file   Occupational History    Not on file   Tobacco Use    Smoking status: Never Smoker    Smokeless tobacco: Never Used   Vaping Use    Vaping Use: Never used   Substance and Sexual Activity    Alcohol use: No    Drug use: No    Sexual activity: Yes     Partners: Male   Other Topics Concern    Not on file   Social History Narrative    Not on file     Social Determinants of Health Financial Resource Strain:     Difficulty of Paying Living Expenses: Not on file   Food Insecurity:     Worried About Running Out of Food in the Last Year: Not on file    Awilda of Food in the Last Year: Not on file   Transportation Needs:     Lack of Transportation (Medical): Not on file    Lack of Transportation (Non-Medical): Not on file   Physical Activity:     Days of Exercise per Week: Not on file    Minutes of Exercise per Session: Not on file   Stress:     Feeling of Stress : Not on file   Social Connections:     Frequency of Communication with Friends and Family: Not on file    Frequency of Social Gatherings with Friends and Family: Not on file    Attends Anabaptist Services: Not on file    Active Member of [address] [place] [address] or Organizations: Not on file    Attends Club or Organization Meetings: Not on file    Marital Status: Not on file   Intimate Partner Violence:     Fear of Current or Ex-Partner: Not on file    Emotionally Abused: Not on file    Physically Abused: Not on file    Sexually Abused: Not on file   Housing Stability:     Unable to Pay for Housing in the Last Year: Not on file    Number of [place] in the Last Year: Not on file    Unstable Housing in the Last Year: Not on file         ALLERGIES: Bactrim [sulfamethoprim], Mint, and Percocet [oxycodone-acetaminophen]    Review of Systems   Constitutional: Negative for chills and fever. HENT: Negative for sinus pressure and sore throat. Eyes: Negative for visual disturbance. Respiratory: Negative for cough and shortness of breath. Cardiovascular: Negative for chest pain. Gastrointestinal: Positive for abdominal pain. Negative for diarrhea, nausea and vomiting. Endocrine: Negative for polyuria. Genitourinary: Negative for difficulty urinating and dysuria. Musculoskeletal: Negative for neck pain and neck stiffness. Skin: Negative for rash. Neurological: Negative for weakness and headaches. Psychiatric/Behavioral: Negative for confusion. All other systems reviewed and are negative. Vitals:    03/03/22 1428   BP: (!) 139/91   Pulse: 99   Resp: 18   Temp: 98.3 °F (36.8 °C)   SpO2: 98%   Weight: 68 kg (150 lb)   Height: 5' 4\" (1.626 m)            Physical Exam  Vitals and nursing note reviewed. Constitutional:       Appearance: Normal appearance. She is not ill-appearing or toxic-appearing. HENT:      Head: Normocephalic and atraumatic. Nose: Nose normal.      Mouth/Throat:      Mouth: Mucous membranes are moist.   Eyes:      Extraocular Movements: Extraocular movements intact. Cardiovascular:      Rate and Rhythm: Normal rate and regular rhythm. Pulses: Normal pulses. Heart sounds: Normal heart sounds. Pulmonary:      Effort: Pulmonary effort is normal. No respiratory distress. Breath sounds: Normal breath sounds. Abdominal:      General: Abdomen is flat. There is no distension. Palpations: Abdomen is soft. Tenderness: There is abdominal tenderness in the suprapubic area and left lower quadrant. There is no guarding or rebound. Musculoskeletal:         General: Normal range of motion. Cervical back: Normal range of motion. No rigidity. Skin:     General: Skin is warm and dry. Neurological:      General: No focal deficit present. Mental Status: She is alert and oriented to person, place, and time. Psychiatric:         Mood and Affect: Mood normal.          MDM  Number of Diagnoses or Management Options  Abdominal pain, LLQ (left lower quadrant)  Diagnosis management comments: Well-appearing 45-year-old female presents to the emergency department complaining of left lower quadrant abdominal tenderness. A broad-based work-up was ordered. Patient offered Toradol but states her pain was not severe she was okay at the time.   Labs show white count 9.4, stable H&H, normal electrolytes and kidney function, normal liver enzymes, lipase 41, UA normal despite ketones present, negative hCG. Patient given limited bolus IV fluid. Will obtain transvaginal sound to rule out torsion versus ovarian cyst versus other pathology. Ultrasound did not reveal evidence of pathology to the right ovary, left ovary unable to visualize. This finding is reassuring and that she does not have a large ovarian cyst that is easily visible. Patient appears far too comfortable to have ovarian torsion. She again was offered Tylenol or Motrin here in the ER stated she would wait till she went home. Patient again is well-appearing, will be discharged home. Given strict return precautions. Patient will see her family physician early next week as needed. She voiced understanding and agreement with this plan.        Amount and/or Complexity of Data Reviewed  Clinical lab tests: ordered and reviewed  Tests in the radiology section of CPT®: reviewed and ordered    Risk of Complications, Morbidity, and/or Mortality  Presenting problems: moderate  Diagnostic procedures: moderate  Management options: moderate           Procedures

## 2022-03-03 NOTE — ED TRIAGE NOTES
Patient reports left lower abd pain x 2 days. Patient denies nausea, vomiting, or urinary pain.  Masked

## 2022-03-04 NOTE — ED NOTES
I have reviewed discharge instructions with the patient. The patient verbalized understanding. Patient left ED via Discharge Method: ambulatory to Home with self. Opportunity for questions and clarification provided. Patient given 0 scripts. To continue your aftercare when you leave the hospital, you may receive an automated call from our care team to check in on how you are doing. This is a free service and part of our promise to provide the best care and service to meet your aftercare needs.  If you have questions, or wish to unsubscribe from this service please call [phone]. Thank you for Choosing our [facility] Emergency Department.

## 2022-03-04 NOTE — DISCHARGE INSTRUCTIONS
Alternate Tylenol and Motrin as needed for abdominal discomfort. Follow-up with family medicine early next week. Return to the ER for worsening or worrisome symptoms.

## 2022-03-18 PROBLEM — M77.8 TENDINITIS OF RIGHT HAND: Status: ACTIVE | Noted: 2020-09-02

## 2022-03-19 PROBLEM — E10.9 DIABETES MELLITUS TYPE 1 (HCC): Status: ACTIVE | Noted: 2017-04-04

## 2022-03-20 PROBLEM — Z96.41 INSULIN PUMP IN PLACE: Status: ACTIVE | Noted: 2021-03-23

## 2022-04-14 LAB
AVERAGE GLUCOSE: NORMAL
HBA1C MFR BLD: 7 %

## 2022-04-18 ENCOUNTER — HOSPITAL ENCOUNTER (EMERGENCY)
Age: 42
Discharge: HOME | End: 2022-04-18
Attending: EMERGENCY MEDICINE
Payer: COMMERCIAL

## 2022-04-18 VITALS
TEMPERATURE: 98.1 F | SYSTOLIC BLOOD PRESSURE: 129 MMHG | BODY MASS INDEX: 25.61 KG/M2 | DIASTOLIC BLOOD PRESSURE: 63 MMHG | HEART RATE: 68 BPM | OXYGEN SATURATION: 98 % | WEIGHT: 150 LBS | RESPIRATION RATE: 17 BRPM | HEIGHT: 64 IN

## 2022-04-18 DIAGNOSIS — R53.83 FATIGUE, UNSPECIFIED TYPE: Primary | ICD-10-CM

## 2022-04-18 LAB
ALBUMIN SERPL-MCNC: 4.1 G/DL (ref 3.5–5)
ALBUMIN/GLOB SERPL: 1.2 {RATIO} (ref 1.2–3.5)
ALP SERPL-CCNC: 75 U/L (ref 50–136)
ALT SERPL-CCNC: 16 U/L (ref 12–65)
ANION GAP SERPL CALC-SCNC: 4 MMOL/L (ref 7–16)
AST SERPL-CCNC: 11 U/L (ref 15–37)
BASOPHILS # BLD: 0.1 K/UL (ref 0–0.2)
BASOPHILS NFR BLD: 1 % (ref 0–2)
BILIRUB SERPL-MCNC: 0.7 MG/DL (ref 0.2–1.1)
BUN SERPL-MCNC: 17 MG/DL (ref 6–23)
CALCIUM SERPL-MCNC: 9.2 MG/DL (ref 8.3–10.4)
CHLORIDE SERPL-SCNC: 102 MMOL/L (ref 98–107)
CO2 SERPL-SCNC: 31 MMOL/L (ref 21–32)
COVID-19 RAPID TEST, COVR: NOT DETECTED
CREAT SERPL-MCNC: 0.78 MG/DL (ref 0.6–1)
DIFFERENTIAL METHOD BLD: ABNORMAL
EOSINOPHIL # BLD: 0.3 K/UL (ref 0–0.8)
EOSINOPHIL NFR BLD: 4 % (ref 0.5–7.8)
ERYTHROCYTE [DISTWIDTH] IN BLOOD BY AUTOMATED COUNT: 11.8 % (ref 11.9–14.6)
GLOBULIN SER CALC-MCNC: 3.4 G/DL (ref 2.3–3.5)
GLUCOSE BLD STRIP.AUTO-MCNC: 150 MG/DL (ref 65–100)
GLUCOSE SERPL-MCNC: 140 MG/DL (ref 65–100)
HCG UR QL: NEGATIVE
HCT VFR BLD AUTO: 40.8 % (ref 35.8–46.3)
HGB BLD-MCNC: 13.5 G/DL (ref 11.7–15.4)
IMM GRANULOCYTES # BLD AUTO: 0 K/UL (ref 0–0.5)
IMM GRANULOCYTES NFR BLD AUTO: 0 % (ref 0–5)
LYMPHOCYTES # BLD: 1.9 K/UL (ref 0.5–4.6)
LYMPHOCYTES NFR BLD: 26 % (ref 13–44)
MAGNESIUM SERPL-MCNC: 2.2 MG/DL (ref 1.8–2.4)
MCH RBC QN AUTO: 30.6 PG (ref 26.1–32.9)
MCHC RBC AUTO-ENTMCNC: 33.1 G/DL (ref 31.4–35)
MCV RBC AUTO: 92.5 FL (ref 79.6–97.8)
MONOCYTES # BLD: 0.7 K/UL (ref 0.1–1.3)
MONOCYTES NFR BLD: 10 % (ref 4–12)
NEUTS SEG # BLD: 4.1 K/UL (ref 1.7–8.2)
NEUTS SEG NFR BLD: 58 % (ref 43–78)
NRBC # BLD: 0 K/UL (ref 0–0.2)
PLATELET # BLD AUTO: 242 K/UL (ref 150–450)
PMV BLD AUTO: 10.1 FL (ref 9.4–12.3)
POTASSIUM SERPL-SCNC: 3.7 MMOL/L (ref 3.5–5.1)
PROT SERPL-MCNC: 7.5 G/DL (ref 6.3–8.2)
RBC # BLD AUTO: 4.41 M/UL (ref 4.05–5.2)
SERVICE CMNT-IMP: ABNORMAL
SODIUM SERPL-SCNC: 137 MMOL/L (ref 136–145)
SOURCE, COVRS: NORMAL
TSH SERPL DL<=0.005 MIU/L-ACNC: 2.04 UIU/ML
WBC # BLD AUTO: 7.1 K/UL (ref 4.3–11.1)

## 2022-04-18 PROCEDURE — 83735 ASSAY OF MAGNESIUM: CPT

## 2022-04-18 PROCEDURE — 81003 URINALYSIS AUTO W/O SCOPE: CPT

## 2022-04-18 PROCEDURE — 74011000250 HC RX REV CODE- 250: Performed by: EMERGENCY MEDICINE

## 2022-04-18 PROCEDURE — 96365 THER/PROPH/DIAG IV INF INIT: CPT

## 2022-04-18 PROCEDURE — 96366 THER/PROPH/DIAG IV INF ADDON: CPT

## 2022-04-18 PROCEDURE — 84443 ASSAY THYROID STIM HORMONE: CPT

## 2022-04-18 PROCEDURE — 87635 SARS-COV-2 COVID-19 AMP PRB: CPT

## 2022-04-18 PROCEDURE — 74011250636 HC RX REV CODE- 250/636: Performed by: EMERGENCY MEDICINE

## 2022-04-18 PROCEDURE — 96361 HYDRATE IV INFUSION ADD-ON: CPT

## 2022-04-18 PROCEDURE — 99284 EMERGENCY DEPT VISIT MOD MDM: CPT

## 2022-04-18 PROCEDURE — 85025 COMPLETE CBC W/AUTO DIFF WBC: CPT

## 2022-04-18 PROCEDURE — 82962 GLUCOSE BLOOD TEST: CPT

## 2022-04-18 PROCEDURE — 81025 URINE PREGNANCY TEST: CPT

## 2022-04-18 PROCEDURE — 80053 COMPREHEN METABOLIC PANEL: CPT

## 2022-04-18 RX ORDER — SODIUM CHLORIDE 0.9 % (FLUSH) 0.9 %
5-10 SYRINGE (ML) INJECTION EVERY 8 HOURS
Status: DISCONTINUED | OUTPATIENT
Start: 2022-04-18 | End: 2022-04-18 | Stop reason: HOSPADM

## 2022-04-18 RX ORDER — SODIUM CHLORIDE 0.9 % (FLUSH) 0.9 %
5-10 SYRINGE (ML) INJECTION AS NEEDED
Status: DISCONTINUED | OUTPATIENT
Start: 2022-04-18 | End: 2022-04-18 | Stop reason: HOSPADM

## 2022-04-18 RX ORDER — DEXTROSE MONOHYDRATE AND SODIUM CHLORIDE 5; .45 G/100ML; G/100ML
200 INJECTION, SOLUTION INTRAVENOUS CONTINUOUS
Status: ACTIVE | OUTPATIENT
Start: 2022-04-18 | End: 2022-04-18

## 2022-04-18 RX ADMIN — SODIUM CHLORIDE 1000 ML: 900 INJECTION, SOLUTION INTRAVENOUS at 12:11

## 2022-04-18 RX ADMIN — DEXTROSE MONOHYDRATE AND SODIUM CHLORIDE 200 ML/HR: 5; .45 INJECTION, SOLUTION INTRAVENOUS at 12:59

## 2022-04-18 NOTE — ED PROVIDER NOTES
45-year-old lady presents with concerns about not feeling well. She says that over the past couple weeks she has had a hard time controlling her blood sugars. She notes that she changed brands of insulin pumps couple weeks ago and since then her sugars have been very difficult to maintain. She says she feels like she did when she was first diagnosed with diabetes. She says she had numbers ranging from the 180s into the 30s. She says she had no specific fevers or chills and no nausea, vomiting, or diarrhea. She says she does feel weak and fatigued. No other associated symptoms. Elements of this note were created using speech recognition software. As such, errors of speech recognition may be present.            Past Medical History:   Diagnosis Date    Asthma     Allergy-Induced    Pain involving joint of finger of left hand     Perennial allergic rhinitis with seasonal variation     Type 1 diabetes mellitus ([facility] [address]        Past Surgical History:   Procedure Laterality Date    HX  SECTION      HX TYMPANOSTOMY      HX WISDOM TEETH EXTRACTION           Family History:   Problem Relation Age of Onset    Diabetes Father         Type 1    Heart Attack Father 32    Cancer Maternal Grandfather         Lung    Heart Disease Maternal Grandfather     Heart Disease Paternal Grandfather     No Known Problems Mother        Social History     Socioeconomic History    Marital status: SINGLE     Spouse name: Not on file    Number of children: Not on file    Years of education: Not on file    Highest education level: Not on file   Occupational History    Not on file   Tobacco Use    Smoking status: Never Smoker    Smokeless tobacco: Never Used   Vaping Use    Vaping Use: Never used   Substance and Sexual Activity    Alcohol use: No    Drug use: No    Sexual activity: Yes     Partners: Male   Other Topics Concern    Not on file   Social History Narrative    Not on file     Social Determinants of Health     Financial Resource Strain:     Difficulty of Paying Living Expenses: Not on file   Food Insecurity:     Worried About Running Out of Food in the Last Year: Not on file    Awilda of Food in the Last Year: Not on file   Transportation Needs:     Lack of Transportation (Medical): Not on file    Lack of Transportation (Non-Medical): Not on file   Physical Activity:     Days of Exercise per Week: Not on file    Minutes of Exercise per Session: Not on file   Stress:     Feeling of Stress : Not on file   Social Connections:     Frequency of Communication with Friends and Family: Not on file    Frequency of Social Gatherings with Friends and Family: Not on file    Attends [employer]ology Services: Not on file    Active Member of [address] [place] [address] [employer] or Organizations: Not on file    Attends Club or Organization Meetings: Not on file    Marital Status: Not on file   Intimate Partner Violence:     Fear of Current or Ex-Partner: Not on file    Emotionally Abused: Not on file    Physically Abused: Not on file    Sexually Abused: Not on file   Housing Stability:     Unable to Pay for Housing in the Last Year: Not on file    Number of [place] in the Last Year: Not on file    Unstable Housing in the Last Year: Not on file         ALLERGIES: Bactrim [sulfamethoprim], Mint, Percocet [oxycodone-acetaminophen], and Sulfa (sulfonamide antibiotics)    Review of Systems   Constitutional: Positive for appetite change and fatigue. Negative for chills, diaphoresis and fever. HENT: Negative for congestion, rhinorrhea and sore throat. Eyes: Negative for redness and visual disturbance. Respiratory: Negative for cough, chest tightness, shortness of breath and wheezing. Cardiovascular: Negative for chest pain and palpitations. Gastrointestinal: Negative for abdominal pain, blood in stool, diarrhea, nausea and vomiting. Endocrine: Negative for polydipsia and polyuria.    Genitourinary: Negative for dysuria and hematuria. Musculoskeletal: Negative for arthralgias, myalgias and neck stiffness. Skin: Negative for rash. Allergic/Immunologic: Negative for environmental allergies and food allergies. Neurological: Negative for dizziness, weakness and headaches. Hematological: Negative for adenopathy. Does not bruise/bleed easily. Psychiatric/Behavioral: Negative for confusion and sleep disturbance. The patient is not nervous/anxious. Vitals:    04/18/22 1157   BP: 134/85   Pulse: 71   Resp: 16   Temp: 98.5 °F (36.9 °C)   SpO2: 100%   Weight: 68 kg (150 lb)   Height: 5' 4\" (1.626 m)            Physical Exam  Vitals and nursing note reviewed. Constitutional:       General: She is not in acute distress. Appearance: She is well-developed. She is not toxic-appearing. HENT:      Head: Normocephalic and atraumatic. Eyes:      General: No scleral icterus. Right eye: No discharge. Left eye: No discharge. Conjunctiva/sclera: Conjunctivae normal.      Pupils: Pupils are equal, round, and reactive to light. Cardiovascular:      Rate and Rhythm: Normal rate and regular rhythm. Heart sounds: Normal heart sounds. Pulmonary:      Effort: Pulmonary effort is normal. No respiratory distress. Breath sounds: Normal breath sounds. No wheezing or rales. Chest:      Chest wall: No tenderness. Abdominal:      General: Bowel sounds are normal. There is no distension. Palpations: Abdomen is soft. Tenderness: There is no guarding or rebound. Musculoskeletal:         General: No tenderness. Normal range of motion. Cervical back: Normal range of motion. No rigidity. Lymphadenopathy:      Cervical: No cervical adenopathy. Skin:     General: Skin is warm and dry. Neurological:      General: No focal deficit present. Mental Status: She is alert and oriented to person, place, and time.    Psychiatric:         Mood and Affect: Mood normal.         Behavior: Behavior normal.          MDM  Number of Diagnoses or Management Options  Diagnosis management comments: His exam is unremarkable. I will check her blood work including a TSH. ED Course as of 04/18/22 1413   Mon Apr 18, 2022   1412 Blood sugar has been remaining stable in the 100 range. Her other blood work was unremarkable.   I do not have an explanation for her symptoms but I do not find thing urgent or emergent and I will discharge her home. [AC]      ED Course User Index  [AC] Chang Bell MD       Procedures

## 2022-04-18 NOTE — DISCHARGE INSTRUCTIONS
As we discussed, we did not find the exact cause of your symptoms today in the emergency department. Therefore, it is important for you to continue to follow-up with your regular doctors for further evaluation. Please return with any fevers, vomiting, worsening symptoms, or additional concerns.

## 2022-04-18 NOTE — ED NOTES
Pt reports recently changed insulin to tslim, reports BG continues to spike w eating and then crashing (+)fatigue, \"groggy\" (-)dyspnea, cp  Pt reports had covid a month-1.5months ago  A&Ox4

## 2022-04-18 NOTE — ED NOTES
I have reviewed discharge instructions with the patient. The patient verbalized understanding. Patient left ED via Discharge Method: ambulatory to Home with self. Opportunity for questions and clarification provided. Patient given 0 scripts. To continue your aftercare when you leave the hospital, you may receive an automated call from our care team to check in on how you are doing. This is a free service and part of our promise to provide the best care and service to meet your aftercare needs.  If you have questions, or wish to unsubscribe from this service please call [phone]. Thank you for Choosing our [facility] Emergency Department.

## 2022-04-18 NOTE — ED TRIAGE NOTES
Patient in diabetic and has been changed on her medication to t slim and using control IQ to manage her pump for insulin adminstration. Patient advises fatigue and generalized not feeling well stating she feels like she did when she first got diagnosed. Patient with lowest reading at 38. Masked.

## 2022-08-03 ENCOUNTER — OFFICE VISIT (OUTPATIENT)
Dept: INTERNAL MEDICINE CLINIC | Facility: CLINIC | Age: 42
End: 2022-08-03
Payer: COMMERCIAL

## 2022-08-03 VITALS
HEART RATE: 76 BPM | OXYGEN SATURATION: 100 % | BODY MASS INDEX: 25.57 KG/M2 | WEIGHT: 149.8 LBS | TEMPERATURE: 97.3 F | HEIGHT: 64 IN | DIASTOLIC BLOOD PRESSURE: 69 MMHG | SYSTOLIC BLOOD PRESSURE: 112 MMHG

## 2022-08-03 DIAGNOSIS — M54.2 NECK PAIN ON RIGHT SIDE: Primary | ICD-10-CM

## 2022-08-03 DIAGNOSIS — M79.641 RIGHT HAND PAIN: ICD-10-CM

## 2022-08-03 PROCEDURE — 99214 OFFICE O/P EST MOD 30 MIN: CPT | Performed by: FAMILY MEDICINE

## 2022-08-03 RX ORDER — CYCLOBENZAPRINE HCL 10 MG
10 TABLET ORAL 3 TIMES DAILY PRN
Qty: 21 TABLET | Refills: 0 | Status: SHIPPED | OUTPATIENT
Start: 2022-08-03 | End: 2022-08-13

## 2022-08-03 RX ORDER — INSULIN ASPART 100 [IU]/ML
INJECTION, SOLUTION INTRAVENOUS; SUBCUTANEOUS
COMMUNITY
Start: 2022-07-29

## 2022-08-03 RX ORDER — BLOOD-GLUCOSE SENSOR
1 EACH MISCELLANEOUS CONTINUOUS
COMMUNITY
Start: 2022-03-02

## 2022-08-03 RX ORDER — MELOXICAM 15 MG/1
15 TABLET ORAL DAILY PRN
Qty: 30 TABLET | Refills: 0 | Status: SHIPPED | OUTPATIENT
Start: 2022-08-03
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
## 2022-08-05 ENCOUNTER — HOSPITAL ENCOUNTER (OUTPATIENT)
Dept: PHYSICAL THERAPY | Age: 42
Setting detail: RECURRING SERIES
Discharge: HOME | End: 2022-08-08
Payer: COMMERCIAL

## 2022-08-05 PROCEDURE — 97161 PT EVAL LOW COMPLEX 20 MIN: CPT

## 2022-08-05 PROCEDURE — 97110 THERAPEUTIC EXERCISES: CPT

## 2022-08-05 ASSESSMENT — PAIN SCALES - GENERAL: PAINLEVEL_OUTOF10: 5
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
## 2022-08-05 NOTE — PROGRESS NOTES
Yoni Aguayo  : 1980  Primary: Progressive  Secondary: Diane @ [address] [address]  Phone: [phone]  Fax: [phone] Plan Frequency: 2x per week    Plan of Care/Certification Expiration Date: 10/05/22      PT Visit Info: Total # of Visits to Date: 1      OUTPATIENT PHYSICAL THERAPY:OP NOTE TYPE: Treatment Note 2022       Episode  }Appt Desk             Treatment Diagnosis:  Cervicalgia [M54.2]  Medical/Referring Diagnosis:  Cervicalgia [M54.2]  Referring Physician:  Stas Cooley MD MD Orders:  PT Eval and Treat   Date of Onset:  Onset Date: 22     Allergies:   Sulfamethoxazole-trimethoprim, Peppermint oil, Oxycodone-acetaminophen, and Sulfa antibiotics  Restrictions/Precautions:  Restrictions/Precautions: None  No data recorded   Interventions Planned (Treatment may consist of any combination of the following):    Current Treatment Recommendations: Strengthening; ROM; Neuromuscular re-education; Manual Therapy - Soft Tissue Mobilization; Pain management; Manual Therapy - Joint Manipulation; Home exercise program; Modalities; Integrated dry needling; Therapeutic activities     Subjective Comments:  Pt states arm and neck feel tight. 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 Initial:}    5/10Post Session:        /10  Medications Last Reviewed:  2022  Updated Objective Findings:  See evaluation note from today  Treatment   Therapeutic Exercises: (10 min)  Pt educated and practiced following HEP:  Chin tuck 10 sec x 10, neck retraction hold against yellow band start at trying to do 2 min hold and work up to 10 min, CHUCKY shoulder retraction with band 20x, L upper trap stretch, R biceps stretch  Manual Therapy (5 min)   myofascial release for CHUCKY posterior scalenes, L 1st rib inferior mobilization.   Treatment/Session Summary:    Treatment Assessment:  good return demonstration of HEP.   Communication/Consultation:  None today  Equipment provided today:  None  Recommendations/Intent for next treatment session: Next visit will focus on US for neck and biceps if , continue/progress HEP as needed, see if pt is able to retract neck further    Total Treatment Billable Duration:  15 minutes  Time In: 1140  Time Out: 100 Hiren Stratton, PT       Charge Capture  }Post Session Pain  PT Visit Info  [facility] Portal  MD [address]    Future Appointments   Date Time Provider Aurora Yee   8/10/2022  4:00 PM Griselda Hayes, LEONARDO [facility]   8/16/2022  4:00 PM [facility]   8/22/2022  3:15 PM [facility]   8/25/2022  3:30 PM Ign[facility] Cl[facility] PT [facility]   8/29/2022  3:15 PM [facility]ONARDO [facility]   9/1/2022  3:30 PM Ignacio Clancy, PT [facility]

## 2022-08-10 ENCOUNTER — HOSPITAL ENCOUNTER (OUTPATIENT)
Dept: PHYSICAL THERAPY | Age: 42
Setting detail: RECURRING SERIES
Discharge: HOME | End: 2022-08-13
Payer: COMMERCIAL

## 2022-08-10 PROCEDURE — 97140 MANUAL THERAPY 1/> REGIONS: CPT

## 2022-08-10 PROCEDURE — 97110 THERAPEUTIC EXERCISES: CPT

## 2022-08-10 ASSESSMENT — PAIN SCALES - GENERAL: PAINLEVEL_OUTOF10: 0

## 2022-08-10 NOTE — PROGRESS NOTES
Chuyita Hewitt  : 1980  Primary: Progressive  Secondary: Diane @ [address] [address]  Phone: [phone]  Fax: [phone] Plan Frequency: 2x per week    Plan of Care/Certification Expiration Date: 10/05/22      PT Visit Info: Total # of Visits to Date: 2        OUTPATIENT PHYSICAL THERAPY:OP NOTE TYPE: Treatment Note 8/10/2022       Episode  }Appt Desk             Treatment Diagnosis:  Cervicalgia [M54.2]  Medical/Referring Diagnosis:  Cervicalgia [M54.2]  Referring Physician:  Hattie Meneses MD MD Orders:  PT Eval and Treat   Date of Onset:  Onset Date: 22     Allergies:   Sulfamethoxazole-trimethoprim, Peppermint oil, Oxycodone-acetaminophen, and Sulfa antibiotics  Restrictions/Precautions:  Restrictions/Precautions: None  No data recorded   Interventions Planned (Treatment may consist of any combination of the following):    Current Treatment Recommendations: Strengthening; ROM; Neuromuscular re-education; Manual Therapy - Soft Tissue Mobilization; Pain management; Manual Therapy - Joint Manipulation; Home exercise program; Modalities; Integrated dry needling; Therapeutic activities     Subjective Comments:  Pt states \"I don't really have pain but I was dizzy at work today. I had to turn and look up at the screen and I think looking up made me dizzy. \"  Initial:}    010Post Session:       010  Medications Last Reviewed:  8/10/2022  Updated Objective Findings:    Treatment   Therapeutic Exercises: (10 min)  Pt educated and practiced following HEP:  Chin tuck 10 sec x 10, CHUCKY shoulder retraction with band 20x, and L upper trap stretch 10 sec hold x 3 B. Manual Therapy (30 min)   Gentle cervical distraction, occipital release and PROM in supine. Myofascial release for CHUCKY posterior scalenes and soft tissue mobilization to upper trapezius. L 1st rib inferior mobilization (in sitting).   Treatment/Session Summary: Treatment Assessment:  Pt did not report increased pain with activity but she reported increased dizziness transferring from supine to sitting. She also reported increased dizziness with the upper trapezius stretch. She states that she has not had a x-ray to the cervical spine.   Communication/Consultation:  None today  Equipment provided today:  None  Recommendations/Intent for next treatment session: Next visit will focus on US for neck and biceps if , continue/progress HEP as needed, see if pt is able to retract neck further    Total Treatment Billable Duration:  40 minutes  Time In: 1545  Time Out: 622 [facility], [facility]       Charge Capture  }Post Session Pain  PT Visit Info  [address] [facility] Portal  MD [address]    Future Appointments   Date Time Provider Aurora Yee   8/16/2022  4:00 PM Kimi Eye, PTA [facility]   8/22/2022  3:15 PM [facility]   8/25/2022  3:30 PM Chrisvelasquez Villegas, PT [facility]   8/29/2022  3:15 PM Kimi Eye, PTA [facility]   9/1/2022  3:30 PM Chris [place], PT [facility]

## 2022-08-11 ENCOUNTER — TELEPHONE (OUTPATIENT)
Dept: INTERNAL MEDICINE CLINIC | Facility: CLINIC | Age: 42
End: 2022-08-11

## 2022-08-11 ENCOUNTER — NURSE TRIAGE (OUTPATIENT)
Dept: OTHER | Facility: CLINIC | Age: 42
End: 2022-08-11

## 2022-08-11 NOTE — TELEPHONE ENCOUNTER
Pam Soliman PTA at [address] [place] [address],[address] ([phone]) states that the patient was dizzy changing positions today with all movements. A vertebral artery test was done that was negative; however, the patient's arm is still shaky and the dizziness did not stop.

## 2022-08-11 NOTE — PROGRESS NOTES
I contacted the referring provider's office and left a message for him regarding the pt's dizziness. The staff member that answered the phone stated that she would pass on the information to the clinical staff.

## 2022-08-11 NOTE — TELEPHONE ENCOUNTER
Received call from [address] at [facility] with [employer]. Subjective: Caller states \"started feeling dizzy yesterday at work and today. Feels like everything is spinning. Also has pain in right ear. \"     Current Symptoms: dizziness    Onset: 1 day ago; unchanged    Associated Symptoms: NA    Pain Severity: when she swallows 7/10; throbbing; intermittent    Temperature: denies fever     What has been tried: nothing    LMP: NA Pregnant: No    Recommended disposition: See PCP within 24 Hours    Care advice provided, patient verbalizes understanding; denies any other questions or concerns; instructed to call back for any new or worsening symptoms. Patient/Caller agrees with recommended disposition; writer provided warm transfer to Nitin Rogel at [facility] for appointment scheduling     Attention Provider: Thank you for allowing me to participate in the care of your patient. The patient was connected to triage in response to information provided to the ECC/PSC. Please do not respond through this encounter as the response is not directed to a shared pool.         Reason for Disposition   [1] MODERATE dizziness (e.g., vertigo; feels very unsteady, interferes with normal activities) AND [2] has NOT been evaluated by physician for this    Protocols used: Dizziness - Vertigo-ADULT-

## 2022-08-16 ENCOUNTER — HOSPITAL ENCOUNTER (OUTPATIENT)
Dept: PHYSICAL THERAPY | Age: 42
Setting detail: RECURRING SERIES
Discharge: HOME | End: 2022-08-19
Payer: COMMERCIAL

## 2022-08-16 PROCEDURE — 97140 MANUAL THERAPY 1/> REGIONS: CPT

## 2022-08-16 PROCEDURE — 97110 THERAPEUTIC EXERCISES: CPT

## 2022-08-16 ASSESSMENT — PAIN SCALES - GENERAL: PAINLEVEL_OUTOF10: 0

## 2022-08-16 NOTE — PROGRESS NOTES
Treatment Assessment:  No reports of pain or dizziness. She reports pain in the R hand but will see the hand surgeon in 2 weeks. Continue to progress stretching and strengthening.   Communication/Consultation:  None today  Equipment provided today:  None  Recommendations/Intent for next treatment session: Next visit will focus on US for neck and biceps if , continue/progress HEP as needed, see if pt is able to retract neck further    Total Treatment Billable Duration:  45 minutes  Time In: 1550  Time Out: 509 [facility], [facility]       Charge Capture  }Post Session Pain  PT Visit Info  [address] [facility] Portal  MD [address]    Future Appointments   Date Time Provider Aurora Yee   8/22/2022  3:15 PM Edilson Allred [facility]   8/25/2022  3:30 PM Paradise Moreira PT [facility]   8/29/2022  3:15 PM Edilson Allred PTA [facility]   9/1/2022  3:30 PM Paradise [facility] PT [facility]

## 2022-08-22 ENCOUNTER — HOSPITAL ENCOUNTER (OUTPATIENT)
Dept: PHYSICAL THERAPY | Age: 42
Setting detail: RECURRING SERIES
Discharge: HOME | End: 2022-08-25
Payer: COMMERCIAL

## 2022-08-22 PROCEDURE — 97035 APP MDLTY 1+ULTRASOUND EA 15: CPT

## 2022-08-22 PROCEDURE — 97140 MANUAL THERAPY 1/> REGIONS: CPT

## 2022-08-22 ASSESSMENT — PAIN SCALES - GENERAL: PAINLEVEL_OUTOF10: 0

## 2022-08-22 NOTE — PROGRESS NOTES
Jourdan Raleigh  : 1980  Primary: Nyasia Alexander Sc  Secondary:  [address] @ [address] [facility] [address]  [address] [place] [address] Way [address]  Phone: [phone]  Fax: [phone] Plan Frequency: 2x per week    Plan of Care/Certification Expiration Date: 10/05/22      PT Visit Info: Total # of Visits to Date: 4        OUTPATIENT PHYSICAL THERAPY:OP NOTE TYPE: Treatment Note 2022       Episode  }Appt Desk             Treatment Diagnosis:  Cervicalgia [M54.2]  Medical/Referring Diagnosis:  Cervicalgia [M54.2]  Referring Physician:  Larry Schwartz MD MD Orders:  PT Eval and Treat. Hand specialist 2022  Date of Onset:  Onset Date: 22     Allergies:   Sulfamethoxazole-trimethoprim, Peppermint oil, Oxycodone-acetaminophen, and Sulfa antibiotics  Restrictions/Precautions:  Restrictions/Precautions: None  No data recorded   Interventions Planned (Treatment may consist of any combination of the following):    Current Treatment Recommendations: Strengthening; ROM; Neuromuscular re-education; Manual Therapy - Soft Tissue Mobilization; Pain management; Manual Therapy - Joint Manipulation; Home exercise program; Modalities; Integrated dry needling; Therapeutic activities     Subjective Comments:  Pt states \"The only thing bothering me is the R hand. I'm having trouble gripping things. \"  Pt reports tightness and weakness in the R hand, especially with gripping. Initial:}    0/10Post Session:       0/10  Medications Last Reviewed:  2022  Updated Objective Findings:    Treatment   Therapeutic Exercises: (5 min)  Pt educated and practiced following HEP:  Chin tuck 10 sec x 10, CHUCKY shoulder retraction 2x10, and L upper trap stretch 10 sec hold x 3 B. Manual Therapy (30 min)   Gentle cervical distraction, occipital release and PROM in supine. Then soft tissue mobilization to scar on R hand with stretching into finger extension.     Ultrasound: (8 minutes): 1 Mhz, 50% duty cycle, 1.0 w/cm2 to palmar surface of R hand  Treatment/Session Summary:    Treatment Assessment:  No reports of neck pain and she is responding well to manual therapy to the cervical spine. Pt's main limitation is weakness with , which affects work.   Communication/Consultation:  None today  Equipment provided today:  None  Recommendations/Intent for next treatment session: Next visit will focus on US for neck and biceps if , continue/progress HEP as needed, see if pt is able to retract neck further    Total Treatment Billable Duration:  43 minutes  Time In: 1500  Time Out: [address], PTA       Charge Capture  }Post Session Pain  PT Visit Info  [facility] Portal  MD [address]    Future Appointments   Date Time Provider Aurora Yee   8/25/2022  3:30 PM Paradise Moreira, PT [facility]   8/29/2022  3:15 PM Edilson Allred PTA [facility]   9/1/2022  3:30 PM Paradise Moreira, JUAN CARLOS [facility]

## 2022-08-25 ENCOUNTER — HOSPITAL ENCOUNTER (OUTPATIENT)
Dept: PHYSICAL THERAPY | Age: 42
Setting detail: RECURRING SERIES
Discharge: HOME | End: 2022-08-28
Payer: COMMERCIAL

## 2022-08-25 PROCEDURE — 97035 APP MDLTY 1+ULTRASOUND EA 15: CPT

## 2022-08-25 PROCEDURE — 97140 MANUAL THERAPY 1/> REGIONS: CPT

## 2022-08-25 NOTE — PROGRESS NOTES
Eduarda Muller  : 1980  Primary: Dona Borjas[place] Sc  Secondary:  [address] @ [address] [address]  Phone: [phone]  Fax: [phone] Plan Frequency: 2x per week    Plan of Care/Certification Expiration Date: 10/05/22      PT Visit Info: Total # of Visits to Date: 5        OUTPATIENT PHYSICAL THERAPY:OP NOTE TYPE: Treatment Note 2022       Episode  }Appt Desk             Treatment Diagnosis:  Cervicalgia [M54.2]  Medical/Referring Diagnosis:  Cervicalgia [M54.2]  Referring Physician:  Ronal Andrews MD MD Orders:  PT Eval and Treat. Hand specialist 2022  Date of Onset:  Onset Date: 22     Allergies:   Sulfamethoxazole-trimethoprim, Peppermint oil, Oxycodone-acetaminophen, and Sulfa antibiotics  Restrictions/Precautions:  Restrictions/Precautions: None  No data recorded   Interventions Planned (Treatment may consist of any combination of the following):    Current Treatment Recommendations: Strengthening; ROM; Neuromuscular re-education; Manual Therapy - Soft Tissue Mobilization; Pain management; Manual Therapy - Joint Manipulation; Home exercise program; Modalities; Integrated dry needling; Therapeutic activities     Subjective Comments:     Pt reports tightness and weakness in the R hand, especially with gripping but neck and arm are better. Need to be able to do all activities at work. Initial:}     /10Post Session:        /10  Medications Last Reviewed:  2022  Updated Objective Findings:  can see swelling still at R 4th digit and is jump tender to touch in this area.    Treatment   Therapeutic Exercises: (3 min)  --attempted self  myofascial release with small ball  Manual Therapy (30 min)  -- Cervical distraction  -- --  myofascial release for R scalenes, Muscle Energy Technique for C7 dysfunction, Muscle Energy Technique and mobilization for R 1st rib for improving inferior glide  -- R 1st anterior rib depression  -- myofascial release for R subscapularis  -- R UE ulnar, radial and median nerve gliding  Ultrasound: (10 minutes): 1 Mhz, 50% duty cycle, 1.2 w/cm2 x 4 min and then switched to continuous at 1.2 to 1.5 w/cm2 x 6 min to palmar surface of R hand  Treatment/Session Summary:    Treatment Assessment:  Pt stated continuous US to seem to relax hand some. Pt may benefit from dry needling to the area and iontophoresis as well. Communication/Consultation:  None today  Equipment provided today:  None  Recommendations/Intent for next treatment session: Next visit will focus on US for hand at continuous, dry needling as well. Look at quality of neck motion and strength. continue/progress HEP.     Total Treatment Billable Duration:  43 minutes  Time In: 3165  Time Out: 1620    Nelia Chavez PT       Charge Capture  }Post Session Pain  PT Visit Info  [facility] [employer] Portal  MD Guidelines  Scanned Media  Benefits  MyChart    Future Appointments   Date Time Provider Aurora Yee   8/30/2022  4:15 PM Janie Topete [facility]   9/1/2022  3:30 PM Nelia Chavez PT [facility]

## 2022-08-30 ENCOUNTER — HOSPITAL ENCOUNTER (OUTPATIENT)
Dept: PHYSICAL THERAPY | Age: 42
Setting detail: RECURRING SERIES
Discharge: HOME | End: 2022-09-02
Payer: COMMERCIAL

## 2022-08-30 PROCEDURE — 97140 MANUAL THERAPY 1/> REGIONS: CPT

## 2022-08-30 PROCEDURE — 97035 APP MDLTY 1+ULTRASOUND EA 15: CPT

## 2022-08-30 PROCEDURE — 97110 THERAPEUTIC EXERCISES: CPT

## 2022-08-30 NOTE — PROGRESS NOTES
Tato Rich  : 1980  Primary: Progressive  Secondary: Diane @ [address] [address]  Phone: [phone]  Fax: [phone] Plan Frequency: 2x per week    Plan of Care/Certification Expiration Date: 10/05/22      PT Visit Info: Total # of Visits to Date: 6        OUTPATIENT PHYSICAL THERAPY:OP NOTE TYPE: Treatment Note 2022       Episode  }Appt Desk             Treatment Diagnosis:  Cervicalgia [M54.2]  Medical/Referring Diagnosis:  Cervicalgia [M54.2]  Referring Physician:  Karely Irene MD MD Orders:  PT Eval and Treat. Hand specialist 2022  Date of Onset:  Onset Date: 22     Allergies:   Sulfamethoxazole-trimethoprim, Peppermint oil, Oxycodone-acetaminophen, and Sulfa antibiotics  Restrictions/Precautions:  Restrictions/Precautions: None  No data recorded   Interventions Planned (Treatment may consist of any combination of the following):    Current Treatment Recommendations: Strengthening; ROM; Neuromuscular re-education; Manual Therapy - Soft Tissue Mobilization; Pain management; Manual Therapy - Joint Manipulation; Home exercise program; Modalities; Integrated dry needling; Therapeutic activities     Subjective Comments:     Neck is doing OK. Still unable to  with R hand. US seemed to help hand feel better but did not help strength. Will see surgeon tomorrow. Tired because at end of several days of working early and all day.    Initial:}     /10Post Session:        /10  Medications Last Reviewed:  2022  Updated Objective Findings:  still difficult to get 4th R finger past neutral towards extension  Treatment   Therapeutic Exercises: (15 min)  --plank on moderate high table, then with opp shoulder touch 30 sec ea   -- propped HORZ ABD 5# 10x 2  -- pt eduction and practice of self trigger point release and stretch for upper trap  Functional Dry Needling (5 min)  --tool assisted soft tissue release to R second, third and fourth interosseoi  Dry Needles Used: 1   Dry Needles Removed: 1     Manual Therapy (15 min)  -- --  myofascial release for CHUCKY scalenes, posterior cervical and CHUCKY upper trap  -- OCB release   -- positional release for CHUCKY upper tra  Ultrasound: (8 minutes): 1 Mhz, continuous to 50% (to tolerance) at 1.5  w/cm2 x 8 min to palmar surface of R hand  Treatment/Session Summary:    Treatment Assessment:  did not tolerate continous US the full 8 min. Seemed to have better hand grasp after dry needling to interosseoi, may benefit from dry needling to pollicis muscles. Communication/Consultation:  None today  Equipment provided today:  None  Recommendations/Intent for next treatment session: Next visit will focus on US for hand at continuous, dry needling as well. Look at quality of neck motion and strength. continue/progress HEP.     Total Treatment Billable Duration:  38 minutes  Time In: 1062  Time Out: [address] [facility], PT       Charge Capture  }Post Session Pain  PT Visit Info  [facility] Portal  MD Guidelines  Scanned Media  Benefits  MyChart    Future Appointments   Date Time Provider Aurora Yee   9/1/2022  3:30 PM [facility], [facility]

## 2022-09-01 ENCOUNTER — HOSPITAL ENCOUNTER (OUTPATIENT)
Dept: PHYSICAL THERAPY | Age: 42
Setting detail: RECURRING SERIES
Discharge: HOME | End: 2022-09-04
Payer: COMMERCIAL

## 2022-09-01 PROCEDURE — 97140 MANUAL THERAPY 1/> REGIONS: CPT

## 2022-09-01 PROCEDURE — 97110 THERAPEUTIC EXERCISES: CPT

## 2022-09-01 NOTE — PROGRESS NOTES
Geofm April  : 1980  Primary: Progressive  Secondary: Diane @ [address] [address]  Phone: [phone]  Fax: [phone] Plan Frequency: 2x per week    Plan of Care/Certification Expiration Date: 10/05/22      PT Visit Info: Total # of Visits to Date: 7        OUTPATIENT PHYSICAL THERAPY:OP NOTE TYPE: Treatment Note 2022       Episode  }Appt Desk             Treatment Diagnosis:  Cervicalgia [M54.2]  Medical/Referring Diagnosis:  Cervicalgia [M54.2]  Referring Physician:  Nalini Ray MD MD Orders:  PT Eval and Treat. Hand specialist 2022  Date of Onset:  Onset Date: 22     Allergies:   Sulfamethoxazole-trimethoprim, Peppermint oil, Oxycodone-acetaminophen, and Sulfa antibiotics  Restrictions/Precautions:  Restrictions/Precautions: None  No data recorded   Interventions Planned (Treatment may consist of any combination of the following):    Current Treatment Recommendations: Strengthening; ROM; Neuromuscular re-education; Manual Therapy - Soft Tissue Mobilization; Pain management; Manual Therapy - Joint Manipulation; Home exercise program; Modalities; Integrated dry needling; Therapeutic activities     Subjective Comments:  Saw hand surgeon and he gave her an injection of a steriod into her R finger. She is sore there today.     Initial:}     /10Post Session:        /10  Medications Last Reviewed:  2022  Updated Objective Findings:  still difficult to get 4th R finger past neutral towards extension  Treatment   Therapeutic Exercises: ( 23 min)  --plank on moderate high table, then with opp shoulder touch 30 sec x3  -- neck flexion to extension against yellow band x 10 and then extension to flexion -- 2 reps  -- propped HORZ ABD 5# 10x 2  -- leaning back with chin tucked   Functional Dry Needling (5 min)  --tool assisted soft tissue release to R pollicis adductor and opponens, second interosseoi  Dry Needles Used: 1   Dry Needles Removed: 1     Manual Therapy (18 min)  -- --  cervical traction and lower cervical AP glides  -- OCB, temporal release   -- positional release for CHUCKY upper trap  Ultrasound: (0 minutes):   Treatment/Session Summary:    Treatment Assessment:  pt is gripping R hand better and will start doing ball squeezes for hand. Headache she had at session start was better by session end. SHe does not know if headaches are from accident or other. Today headache was likely from tension which deminished use of R hand would with working as  would likely contribute to increased tension in neck. Communication/Consultation:  None today  Equipment provided today:  None  Recommendations/Intent for next treatment session: Next visit will focus on continue/progress HEP. Total Treatment Billable Duration:  41 minutes, pt not billed for dry needling.    Time In: 1537  Time Out: [place], PT       Charge Capture  }Post Session Pain  PT Visit Info  [employer] Portal  MD Guidelines  Scanned Media  Benefits  MyChart    Future Appointments   Date Time Provider Aurora Yee   9/9/2022  9:15 AM [facility]   9/13/2022 11:15 AM [place] Nita, PT [facility]   9/15/2022  4:00 PM [place] Nita, PT [facility]   9/20/2022  4:00 PM [place] [facility]   9/22/2022  3:15 PM [facility], [facility]   9/27/2022  3:15 PM [facility], [facility]   9/29/2022  3:15 PM Jairo Nita, PT [facility]

## 2022-09-09 ENCOUNTER — HOSPITAL ENCOUNTER (OUTPATIENT)
Dept: PHYSICAL THERAPY | Age: 42
Setting detail: RECURRING SERIES
Discharge: HOME | End: 2022-09-12
Payer: COMMERCIAL

## 2022-09-09 PROCEDURE — 97140 MANUAL THERAPY 1/> REGIONS: CPT

## 2022-09-09 PROCEDURE — 97110 THERAPEUTIC EXERCISES: CPT
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
## 2022-09-09 NOTE — PROGRESS NOTES
Marie Lal  : 1980  Primary: Progressive  Secondary: Diane @ [address] [address]  Phone: [phone]  Fax: [phone] Plan Frequency: 2x per week    Plan of Care/Certification Expiration Date: 10/05/22      PT Visit Info: Total # of Visits to Date: 8        OUTPATIENT PHYSICAL THERAPY:OP NOTE TYPE: Treatment Note 2022       Episode  }Appt Desk             Treatment Diagnosis:  Cervicalgia [M54.2]  Medical/Referring Diagnosis:  Cervicalgia [M54.2]  Referring Physician:  Jamshid Altamirano MD MD Orders:  PT Eval and Treat. Hand specialist 2022  Date of Onset:  Onset Date: 22     Allergies:   Sulfamethoxazole-trimethoprim, Peppermint oil, Oxycodone-acetaminophen, and Sulfa antibiotics  Restrictions/Precautions:  Restrictions/Precautions: None  No data recorded   Interventions Planned (Treatment may consist of any combination of the following):    Current Treatment Recommendations: Strengthening; ROM; Neuromuscular re-education; Manual Therapy - Soft Tissue Mobilization; Pain management; Manual Therapy - Joint Manipulation; Home exercise program; Modalities; Integrated dry needling; Therapeutic activities     Subjective Comments:  hand doing better. pain levels down. Hand still gets tired with chopping and still having troubles gripping enough toopen jar.     Initial:}     /10Post Session:        /10  Medications Last Reviewed:  2022  Updated Objective Findings:  R hand  3 avg=  20 lbs; see progress report  Treatment   Therapeutic Exercises: ( 15vmin)  --gripper 1.5# for last 2 fingers 10x3  -- self R 1st rib mob and T1 mob  -- C1/2 stretch/sternocleidomastoid muscle stretching  -- median nerve gliding  Functional Dry Needling - not today    Manual Therapy (25 min)  -- --  myofascial release for R scalenes, Muscle Energy Technique for C7 dysfunction, Muscle Energy Technique and mobilization for R 1st rib RESPONDED TO BED ALARM. PT SITTING UP IN BED, C.O. SOB AND COUGHING. RT CALLED
FOR PRN BREATHING TX. RT ANTONI IN ROOM TO ADMIN BREATHING TX. ASSESSMENT
COMPLETE. O2 SATURATIONS WNL ON CONTINOUS PULSE OX- 1.5 LITERS OXYGEN VIA NC.
PT ENCOURAGED TO TAKE DEEP BREATHS THROUGH HIS NOSE. PT HAS PRODUCTIVE COUGH.
PT UP TO BED SIDE COMMODE WITH SBA. CALL LIGHT NEXT TO PT. for improving inferior glide  - Muscle Energy Technique for T1 dysfunction and C1/2 dysfunciton    Ultrasound: (0 minutes):   Treatment/Session Summary:    Treatment Assessment:  pt with decrease median nerve tension after manual work but still had + R Spurlings. Communication/Consultation:  None today  Equipment provided today:  None  Recommendations/Intent for next treatment session: Next visit will focus on continue/progress HEP.     Total Treatment Billable Duration:  40 minutes  Time In: 8012  Time Out: JUAN CARLOS Verma       Charge Capture  }Post Session Pain  PT Visit Info  [employer] [facility] [employer] [facility] Portal  MD Guidelines  Scanned Media  Benefits  MyChart    Future Appointments   Date Time Provider Aurora Yee   9/13/2022  8:15 AM Ignacio Clancy, PT [facility]   9/15/2022  4:00 PM [facility]   9/20/2022  4:00 PM [facility]   9/22/2022  3:15 PM [facility]   9/27/2022  3:15 PM [facility]   9/29/2022  3:15 PM [facility]

## 2022-09-13 ENCOUNTER — HOSPITAL ENCOUNTER (OUTPATIENT)
Dept: PHYSICAL THERAPY | Age: 42
Setting detail: RECURRING SERIES
Discharge: HOME | End: 2022-09-16
Payer: COMMERCIAL

## 2022-09-13 PROCEDURE — 97110 THERAPEUTIC EXERCISES: CPT

## 2022-09-13 NOTE — PROGRESS NOTES
Eduarda Muller  : 1980  Primary: Progressive  Secondary: Diane @ [address] [address]  Phone: [phone]  Fax: [phone] Plan Frequency: 2x per week    Plan of Care/Certification Expiration Date: 10/05/22      PT Visit Info: Total # of Visits to Date: 9        OUTPATIENT PHYSICAL THERAPY:OP NOTE TYPE: Treatment Note 2022       Episode  }Appt Desk             Treatment Diagnosis:  Cervicalgia [M54.2]  Medical/Referring Diagnosis:  Cervicalgia [M54.2]  Referring Physician:  Ronal Andrews MD MD Orders:  PT Eval and Treat. Hand specialist 2022  Date of Onset:  Onset Date: 22     Allergies:   Sulfamethoxazole-trimethoprim, Peppermint oil, Oxycodone-acetaminophen, and Sulfa antibiotics  Restrictions/Precautions:  Restrictions/Precautions: None  No data recorded   Interventions Planned (Treatment may consist of any combination of the following):    Current Treatment Recommendations: Strengthening; ROM; Neuromuscular re-education; Manual Therapy - Soft Tissue Mobilization; Pain management; Manual Therapy - Joint Manipulation; Home exercise program; Modalities; Integrated dry needling; Therapeutic activities     Subjective Comments:  still difficult to grasp heavier items. Initial:}     /10Post Session:        /10 no VAS today. Pt stated throbbing pain in finger improved after dry needling + ice.    Medications Last Reviewed:  2022  Updated Objective Findings:  R hand  3 avg=  20 lbs; stiff at L 1st rib still and tight L pec  Treatment   Therapeutic Exercises: ( 38 min)  --gripper 1.5# for last 2 fingers 10x2, also did 3.0# gripper with last 2 fingers together x 2  -- self R 1st rib mob and T1 mob  -- upper thoracic ext stretching against wall and prayer stretch  -- wall pec stretch 30 sec x 3   -- plank on table with perterbations x ~ 1 min  -- squat with shoulder extn 10# in each hand 10x  -- C1/2 stretch/sternocleidomastoid muscle stretching  -- median nerve gliding  Functional Dry Needling ( 2 min)  tool assisted soft tissue release to R 4th finger interossei muscles. 1 needle used and removed. Manual Therapy (0 min)    Modalities: (0 minutes): ice at session end for 5- 10 min for R hand to decrease potential inflammation to the area    Treatment/Session Summary:    Treatment Assessment:  good return demonstration of exercises for upper body but had increased pain in R finger after doing execises that worked that finger. Throbbing pain improved by session end with dry needling + ice. Communication/Consultation:  None today  Equipment provided today:  None  Recommendations/Intent for next treatment session: Next visit will focus on continue/progress HEP.     Total Treatment Billable Duration:  40 minutes  Time In: 0839  Time Out: 0920    [place], PT       Charge Capture  }Post Session Pain  PT Visit Info  [employer] Portal  MD Guidelines  Scanned Media  Benefits  MyChart    Future Appointments   Date Time Provider Aurora Yee   9/15/2022  4:00 PM [place], PT [facility]   9/20/2022  4:00 PM [place], PT [facility]   9/22/2022  3:15 PM [place], PT [facility]   9/27/2022  3:15 PM [place], PT [facility]   9/29/2022  3:15 PM [place], PT [facility]

## 2022-09-15 ENCOUNTER — HOSPITAL ENCOUNTER (OUTPATIENT)
Dept: PHYSICAL THERAPY | Age: 42
Setting detail: RECURRING SERIES
End: 2022-09-15
Payer: COMMERCIAL

## 2022-09-21 ENCOUNTER — HOSPITAL ENCOUNTER (OUTPATIENT)
Dept: PHYSICAL THERAPY | Age: 42
Setting detail: RECURRING SERIES
Discharge: HOME | End: 2022-09-24
Payer: COMMERCIAL

## 2022-09-21 PROCEDURE — 97140 MANUAL THERAPY 1/> REGIONS: CPT

## 2022-09-21 PROCEDURE — 97110 THERAPEUTIC EXERCISES: CPT

## 2022-09-21 ASSESSMENT — PAIN SCALES - GENERAL: PAINLEVEL_OUTOF10: 5

## 2022-09-21 NOTE — PROGRESS NOTES
pain. Pt was positioned in supine for first rib mobilization and cervical distraction. Soft tissue mob to [facility]. 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 She required frequent verbal and visual cues for correct body mechanics. Communication/Consultation:  None today  Equipment provided today:  None  Recommendations/Intent for next treatment session: Next visit will focus on continue/progress HEP.     Total Treatment Billable Duration:  45 minutes  Time In: 0845  Time Out: [facility]       Charge Capture  }Post Session Pain  PT Visit Info  [employer] Portal  MD Guidelines  Scanned Media  Benefits  MyChart    Future Appointments   Date Time Provider Aurora Yee   9/22/2022  3:15 PM Jairo Moya, PT [facility]   9/27/2022  3:15 PM Jairo Moya, PT [facility]   9/29/2022  3:15 PM [facility]

## 2022-09-22 ENCOUNTER — HOSPITAL ENCOUNTER (OUTPATIENT)
Dept: PHYSICAL THERAPY | Age: 42
Setting detail: RECURRING SERIES
Discharge: HOME | End: 2022-09-25
Payer: COMMERCIAL

## 2022-09-22 PROCEDURE — 97110 THERAPEUTIC EXERCISES: CPT

## 2022-09-22 PROCEDURE — 97140 MANUAL THERAPY 1/> REGIONS: CPT

## 2022-09-22 NOTE — PROGRESS NOTES
Donte Hyatts  : 1980  Primary: Progressive  Secondary: Diane @ [address] [place] [address] Way [address]  Phone: [phone]  Fax: [phone] Plan Frequency: 2x per week    Plan of Care/Certification Expiration Date: 10/05/22      PT Visit Info: Total # of Visits to Date: 6        OUTPATIENT PHYSICAL THERAPY:OP NOTE TYPE: Treatment Note 2022       Episode  }Appt Desk             Treatment Diagnosis:  Cervicalgia [M54.2]  Medical/Referring Diagnosis:  Cervicalgia [M54.2]  Referring Physician:  Víctor Pyle MD MD Orders:  PT Eval and Treat. Hand specialist 2022  Date of Onset:  Onset Date: 22     Allergies:   Sulfamethoxazole-trimethoprim, Peppermint oil, Oxycodone-acetaminophen, and Sulfa antibiotics  Restrictions/Precautions:  Restrictions/Precautions: None  No data recorded   Interventions Planned (Treatment may consist of any combination of the following):    Current Treatment Recommendations: Strengthening; ROM; Neuromuscular re-education; Manual Therapy - Soft Tissue Mobilization; Pain management; Manual Therapy - Joint Manipulation; Home exercise program; Modalities; Integrated dry needling; Therapeutic activities     Subjective Comments:  Pt had a very tough day at work and 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 C/o R biceps pull and pain in lower R rib cage. Neck is tense at end of day but overall ok. Initial:}     /10Post Session:        /10   Medications Last Reviewed:  2022  Updated Objective Findings:  + R Spurlings    Treatment   Therapeutic Exercises: (30 min) to decrease pain and increase ROM.   -- CHUCKY upper trap stretching  -- CHUCKY levator strap stretching   -- upper cervical stretching with chin tuck and neck flexion  -- cervical traction in neutral with progression to rotation R  -- OCB release  --prone neck extension hold off table with gentle resistance 30 sec x 2    Manual Therapy (15 minutes)   -- lower cervical PA and transverse glides  -- mid thoracic rib PA glides  -- cervical traction in neutral with progression to rotation R  -- OCB release  Modalities: (0 minutes):     Treatment/Session Summary:    Treatment Assessment:  had + R spurlings that improved with doing traction with neck rotation R. Communication/Consultation:  None today  Equipment provided today:  None  Recommendations/Intent for next treatment session: Next visit will focus on continue/progress HEP. -- return to doing UE work with shoulder HORZ ABD , lower trap and neck extension work.      Total Treatment Billable Duration:  45 minutes  Time In: 1600  Time Out: 1000 [facility], PT       Charge Capture  }Post Session Pain  PT Visit Info  [employer] Portal  MD Guidelines  Scanned Media  Benefits  MyChart    Future Appointments   Date Time Provider Aurora Yee   9/27/2022  7:00 PM Cait Bernabe, PT [facility]   9/29/2022  3:15 PM Cait Bernabe, PT [facility]   10/11/2022  3:15 PM Cait Bernabe, PT [facility]   10/18/2022  3:15 PM Cait Bernabe, PT [facility]

## 2022-09-27 ENCOUNTER — APPOINTMENT (OUTPATIENT)
Dept: PHYSICAL THERAPY | Age: 42
End: 2022-09-27
Payer: COMMERCIAL

## 2022-09-29 ENCOUNTER — APPOINTMENT (OUTPATIENT)
Dept: PHYSICAL THERAPY | Age: 42
End: 2022-09-29
Payer: COMMERCIAL

## 2022-10-11 ENCOUNTER — HOSPITAL ENCOUNTER (OUTPATIENT)
Dept: PHYSICAL THERAPY | Age: 42
Setting detail: RECURRING SERIES
Discharge: HOME | End: 2022-10-14
Payer: COMMERCIAL

## 2022-10-11 PROCEDURE — 97140 MANUAL THERAPY 1/> REGIONS: CPT

## 2022-10-11 PROCEDURE — 97110 THERAPEUTIC EXERCISES: CPT

## 2022-10-11 NOTE — PROGRESS NOTES
Ilene Saleem  : 1980  Primary: Progressive  Secondary: Diane @ [address] [place] [address] Way [address]  Phone: [phone]  Fax: [phone] Plan Frequency: 1x per week. Schedule dependent. Plan of Care/Certification Expiration Date: 22      PT Visit Info: Total # of Visits to Date: 15        OUTPATIENT PHYSICAL THERAPY:OP NOTE TYPE: Treatment Note 10/11/2022       Episode  }Appt Desk             Treatment Diagnosis:  Cervicalgia [M54.2]  Medical/Referring Diagnosis:  Cervicalgia [M54.2]  Referring Physician:  Denice Palmer MD MD Orders:  PT Eval and Treat. Hand specialist 2022  Date of Onset:  Onset Date: 22     Allergies:   Sulfamethoxazole-trimethoprim, Peppermint oil, Oxycodone-acetaminophen, and Sulfa antibiotics  Restrictions/Precautions:  Restrictions/Precautions: None  No data recorded   Interventions Planned (Treatment may consist of any combination of the following):    Current Treatment Recommendations: Strengthening; ROM; Neuromuscular re-education; Manual Therapy - Soft Tissue Mobilization; Pain management; Manual Therapy - Joint Manipulation; Home exercise program; Modalities; Integrated dry needling; Therapeutic activities     Subjective Comments:  saw her hand surgeon and was told to continue PT.  FOund ganglian cyst in L hand that was causing wrist pain and was given shot for this that was helpful. Still getting hand numbness and tightness at work and was given updates on meds. Holding knife is a little better. Initial:}     /10Post Session:        /10   Medications Last Reviewed:  10/11/2022  Updated Objective Findings:  + R Spurlings    Treatment   Therapeutic Exercises: (15 min) to decrease pain and increase ROM. -- finger strengthening with digi flex- 3 lb 10x ea R hand, 5 lb 10x 2ea R hand.    -- supine thoracic and  cervical extension with added distraction  -- supine lower neck strengthening ~ 3 min  -- review of HEP with chin tuck and chin tuck with resistance    Manual Therapy (23  minutes)   -- lower cervical AP  glides  -- cervical traction in neutral with progression to rotation R and L  -- OCB release  Modalities: (0 minutes):     Treatment/Session Summary:    Treatment Assessment:  Spurling was negative after doing traction with rotation, so instructed pt to work on chin tucks and neck rotation. R hand strength slowly improving. Communication/Consultation:  None today  Equipment provided today:  None  Recommendations/Intent for next treatment session: Next visit will focus on continue/progress HEP. -- return to doing UE work with shoulder HORZ ABD , lower trap and neck extension work.      Total Treatment Billable Duration:  38 minutes  Time In: 8998  Time Out: [address], PT       Charge Capture  }Post Session Pain  PT Visit Info  [employer] Portal  MD Guidelines  Scanned Media  Benefits  MyChart    Future Appointments   Date Time Provider Aurora Yee   10/18/2022  3:15 PM Iza Class, PT [facility]   10/27/2022  3:15 PM Iza Class, PT [facility]   11/3/2022  3:15 PM Iza Class, PT [facility]   11/10/2022  2:30 PM Iza Class, PT [facility]   11/17/2022  2:30 PM Iza Class, PT [facility]   11/22/2022  4:00 PM Iza Class, PT [facility]   12/1/2022  2:30 PM Iza Class, PT [facility]   12/8/2022  2:30 PM Iza Class, PT [facility]
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
## 2022-10-18 ENCOUNTER — HOSPITAL ENCOUNTER (OUTPATIENT)
Dept: PHYSICAL THERAPY | Age: 42
Setting detail: RECURRING SERIES
Discharge: HOME | End: 2022-10-21
Payer: COMMERCIAL

## 2022-10-18 PROCEDURE — 97140 MANUAL THERAPY 1/> REGIONS: CPT

## 2022-10-18 PROCEDURE — 97110 THERAPEUTIC EXERCISES: CPT

## 2022-10-18 NOTE — PROGRESS NOTES
Marie Godinez  : 1980  Primary: Progressive  Secondary: Diane @ [address] [place] [address] Way [address]  Phone: [phone]  Fax: [phone] Plan Frequency: 1x per week. Schedule dependent. Plan of Care/Certification Expiration Date: 22      PT Visit Info: Total # of Visits to Date: 15        OUTPATIENT PHYSICAL THERAPY:OP NOTE TYPE: Treatment Note 10/18/2022       Episode  }Appt Desk             Treatment Diagnosis:  Cervicalgia [M54.2]  Medical/Referring Diagnosis:  Cervicalgia [M54.2]  Referring Physician:  Jeanmarie Hickey MD MD Orders:  PT Eval and Treat. Hand specialist 2022  Date of Onset:  Onset Date: 22     Allergies:   Sulfamethoxazole-trimethoprim, Peppermint oil, Oxycodone-acetaminophen, and Sulfa antibiotics  Restrictions/Precautions:  Restrictions/Precautions: None  No data recorded   Interventions Planned (Treatment may consist of any combination of the following):    Current Treatment Recommendations: Strengthening; ROM; Neuromuscular re-education; Manual Therapy - Soft Tissue Mobilization; Pain management; Manual Therapy - Joint Manipulation; Home exercise program; Modalities; Integrated dry needling; Therapeutic activities     Subjective Comments:  Tired from being at work. Initial:}     /10Post Session:        /10   Medications Last Reviewed:  10/18/2022  Updated Objective Findings:  + R Spurlings-- but pain not as significant    Treatment   Therapeutic Exercises: (10 min) to decrease pain and increase ROM. -- finger strengthening with digi flex- 5 lb 10x 2ea R hand.    -- prone opp UE/LE with prolonged hold 2x ea side  -- quadriped to push up position 2x    Manual Therapy (30 minutes)   -- lower cervical AP  glides  -- cervical traction in neutral with progression to rotation R and L  -- OCB release  --  myofascial release for posterior cervical   -- positional release for CHUCKY upper trap  Modalities: (0 minutes):     Treatment/Session Summary:    Treatment Assessment:  Pt stated neck much better by session end. Pt seemed too tired to do very many exercises today. Communication/Consultation:  None today  Equipment provided today:  None  Recommendations/Intent for next treatment session: Next visit will focus on continue/progress HEP. -- return to doing UE work with shoulder HORZ ABD , lower trap and neck extension work.      Total Treatment Billable Duration:  40 minutes  Time In: 2059  Time Out: [place], PT       Charge Capture  }Post Session Pain  PT Visit Info  [email or web address] Portal  MD Guidelines  Scanned Media  Benefits  MyChart    Future Appointments   Date Time Provider Aurora Yee   10/27/2022  3:15 PM [facility]   11/3/2022  3:15 PM Earma Rout, PT [facility]   11/10/2022  2:30 PM Earma Rout, PT [facility]   11/17/2022  2:30 PM Earma Rout, PT [facility]   11/22/2022  4:00 PM Earma Rout, PT [facility]   12/1/2022  2:30 PM Earma Rout, PT [facility]   12/8/2022  2:30 PM Earma Rout, PT [facility]

## 2022-10-27 ENCOUNTER — HOSPITAL ENCOUNTER (OUTPATIENT)
Dept: PHYSICAL THERAPY | Age: 42
Setting detail: RECURRING SERIES
Discharge: HOME | End: 2022-10-30
Payer: COMMERCIAL

## 2022-10-27 PROCEDURE — 97110 THERAPEUTIC EXERCISES: CPT

## 2022-10-27 PROCEDURE — 97140 MANUAL THERAPY 1/> REGIONS: CPT

## 2022-10-27 NOTE — PROGRESS NOTES
Oswaldo Miranda  : 1980  Primary: Progressive  Secondary: Diane @ [address] [place] [address] Way [address]  Phone: [phone]  Fax: [phone] Plan Frequency: 1x per week. Schedule dependent. Plan of Care/Certification Expiration Date: 22      PT Visit Info: Total # of Visits to Date: 15        OUTPATIENT PHYSICAL THERAPY:OP NOTE TYPE: Treatment Note 10/27/2022       Episode  }Appt Desk             Treatment Diagnosis:  Cervicalgia [M54.2]  Medical/Referring Diagnosis:  Cervicalgia [M54.2]  Referring Physician:  Shannon Nunez MD MD Orders:  PT Eval and Treat. Hand specialist 2022  Date of Onset:  Onset Date: 22     Allergies:   Sulfamethoxazole-trimethoprim, Peppermint oil, Oxycodone-acetaminophen, and Sulfa antibiotics  Restrictions/Precautions:  Restrictions/Precautions: None  No data recorded   Interventions Planned (Treatment may consist of any combination of the following):    Current Treatment Recommendations: Strengthening; ROM; Neuromuscular re-education; Manual Therapy - Soft Tissue Mobilization; Pain management; Manual Therapy - Joint Manipulation; Home exercise program; Modalities; Integrated dry needling; Therapeutic activities     Subjective Comments:       Initial:}     /10Post Session:        /10   Medications Last Reviewed:  10/27/2022  Updated Objective Findings:  neg CHUCKY Spurlings    R hand squeeze 24 lb -- 3 rep average    Treatment   Therapeutic Exercises: (23 min) to decrease pain and increase ROM.   -- finger strengthening with digi flex- 5 lb 10x 2ea R hand.; 7 lb 10x   -- propped opp UE/LE 10x ea side  -- thoracic/neck ext in prone, chest off table 30 sec x3   -- side plank 20 sec x 2 CHUCKY  Manual Therapy (15  minutes)   -- lower cervical AP  glides  -- cervical traction in neutral   -- OCB release  --  myofascial release for posterior cervical   -- positional release for CHUCKY upper trap  Modalities: (0 minutes):     Treatment/Session Summary:    Treatment Assessment:  Pt has shown progress in neck pain with every day and work activities. Has not had as much muscle spasm in neck. Tolerates strengthening program and is progressing towards discharge level HEP. Communication/Consultation:  None today  Equipment provided today:  None  Recommendations/Intent for next treatment session: Next visit will focus on continue/progress HEP.--  shoulder HORZ ABD , lower trap and neck extension work.      Total Treatment Billable Duration:  38 minutes  Time In: 4208  Time Out: [place], PT       Charge Capture  }Post Session Pain  PT Visit Info  [employer] Portal  MD Guidelines  Scanned Media  Benefits  MyChart    Future Appointments   Date Time Provider Aurora Yee   11/3/2022  3:15 PM Shirlie Conklin, PT [facility]   11/10/2022  2:30 PM Shirlie Conklin, PT [facility]   11/17/2022  2:30 PM Shirlie Conklin, PT [facility]   11/22/2022  4:00 PM Shirlie Conklin, PT [facility]   12/1/2022  2:30 PM Shirlie Conklin, PT [facility]   12/8/2022  2:30 PM Shirlie Conklin, PT [facility]

## 2022-11-03 ENCOUNTER — HOSPITAL ENCOUNTER (OUTPATIENT)
Dept: PHYSICAL THERAPY | Age: 42
Setting detail: RECURRING SERIES
End: 2022-11-03
Payer: COMMERCIAL

## 2022-11-03 NOTE — PROGRESS NOTES
Maria Guadalupe Dexter  : 1980  Primary: Progressive  Secondary: [address] at [address], [address]  [phone]   [phone]        Pt canceled secondary to death in her 's family.     Jai Linda, PT MSPT

## 2022-11-10 ENCOUNTER — HOSPITAL ENCOUNTER (OUTPATIENT)
Dept: PHYSICAL THERAPY | Age: 42
Setting detail: RECURRING SERIES
Discharge: HOME | End: 2022-11-13
Payer: COMMERCIAL

## 2022-11-10 PROCEDURE — 97110 THERAPEUTIC EXERCISES: CPT

## 2022-11-10 PROCEDURE — 97140 MANUAL THERAPY 1/> REGIONS: CPT

## 2022-11-10 NOTE — PROGRESS NOTES
Jorgito Estrada  : 1980  Primary: Progressive  Secondary: Diane @ [address] [address]  Phone: [phone]  Fax: [phone] Plan Frequency: 1x per week. Schedule dependent. Plan of Care/Certification Expiration Date: 22      PT Visit Info: Total # of Visits to Date: 13        OUTPATIENT PHYSICAL THERAPY:OP NOTE TYPE: Treatment Note 11/10/2022       Episode  }Appt Desk             Treatment Diagnosis:  Cervicalgia [M54.2]  Medical/Referring Diagnosis:  Cervicalgia [M54.2]  Referring Physician:  Benjamin Arias MD MD Orders:  PT Eval and Treat. Hand specialist 2022  Date of Onset:  Onset Date: 22     Allergies:   Sulfamethoxazole-trimethoprim, Peppermint oil, Oxycodone-acetaminophen, and Sulfa antibiotics  Restrictions/Precautions:  Restrictions/Precautions: None  No data recorded   Interventions Planned (Treatment may consist of any combination of the following):    Current Treatment Recommendations: Strengthening; ROM; Neuromuscular re-education; Manual Therapy - Soft Tissue Mobilization; Pain management; Manual Therapy - Joint Manipulation; Home exercise program; Modalities; Integrated dry needling; Therapeutic activities     Subjective Comments:  Hand is not getting as tired anymore. Initial:}     /10Post Session:        /10   Medications Last Reviewed:  11/10/2022  Updated Objective Findings:  neg CHUCKY Spurlings    R hand squeeze 44 lb -- 3 rep average    Treatment   Therapeutic Exercises: (23 min) to decrease pain and increase ROM.   -- finger strengthening with digi flex- 7 lb 10x 2  -- propped opp UE/LE 10x ea side  -- thoracic/neck ext in prone, chest off table 30 sec x3   -- side plank 30 sec x CHUCKY  Manual Therapy (15  minutes)   -- OCB release  --  myofascial release and stretch for posterior cervical   -- positional release for CHUCKY upper trap with overpressure to trigger point  Modalities: (0 minutes):     Treatment/Session Summary:    Treatment Assessment:  pt progressing well. still very tight to posterior neck so needs focus on stretches for work. Communication/Consultation:  None today  Equipment provided today:  None  Recommendations/Intent for next treatment session: Next visit will focus on continue/progress HEP.--  shoulder HORZ ABD and review stretches to prepare for discharge to HEP. Total Treatment Billable Duration:  38 minutes  Time In: 7790  Time Out: 1516    Mellisa Fraser, PT       Charge Capture  }Post Session Pain  PT Visit Info  [employer] Portal  MD Guidelines  Scanned Media  Benefits  MyChart    No future appointments.

## 2022-11-17 ENCOUNTER — HOSPITAL ENCOUNTER (OUTPATIENT)
Dept: PHYSICAL THERAPY | Age: 42
Setting detail: RECURRING SERIES
End: 2022-11-17
Payer: COMMERCIAL

## 2022-11-17 NOTE — PROGRESS NOTES
Ilene Saleem  : 1980  Primary: Progressive  Secondary: [address] [place] at [address], [address]  [phone]   [phone]        Pt not able to come today- sick.   Adi Zepeda, PT

## 2022-11-18 ENCOUNTER — NURSE TRIAGE (OUTPATIENT)
Dept: OTHER | Facility: CLINIC | Age: 42
End: 2022-11-18

## 2022-11-18 NOTE — TELEPHONE ENCOUNTER
Location of patient: [place]    Received call from Sonali at [employer] with [employer]. Subjective: Caller states \"Tuesday I began getting sensitive to touch on the right of my face. . Wednesday it was worse. My skin is very sensitive. Even my  eyelashes hurt my eyelids. \"     Current Symptoms: right sided face pain, VERY sensitive to touch, \"feels like glass in my right eyeball\"    Right eye watering and painful . Possibly swelling, unsure. Went to [facility] and was told she needed blood work, they thought she might have shingles, but equipment for blood work no working. Gave RX    Onset: 3 days ago; unchanged    Associated Symptoms: NA    Pain Severity: 5-9/10; tender, stabbing; constant, waxing and waning    Temperature: none     What has been tried: ibuprofen,valcyclovir RX    LMP: NA Pregnant: No    Recommended disposition: See in Office Today    Care advice provided, patient verbalizes understanding; denies any other questions or concerns; instructed to call back for any new or worsening symptoms. Patient/Caller agrees with recommended disposition; writer provided warm transfer to Tona Ramesh at [employer] for appointment scheduling    Attention Provider: Thank you for allowing me to participate in the care of your patient. The patient was connected to triage in response to information provided to the ECC/PSC. Please do not respond through this encounter as the response is not directed to a shared pool. Reason for Disposition   Face pain present > 24 hours    Protocols used:  Face Pain-ADULT-[place]

## 2022-11-21 ENCOUNTER — OFFICE VISIT (OUTPATIENT)
Dept: INTERNAL MEDICINE CLINIC | Facility: CLINIC | Age: 42
End: 2022-11-21
Payer: COMMERCIAL

## 2022-11-21 VITALS
DIASTOLIC BLOOD PRESSURE: 79 MMHG | SYSTOLIC BLOOD PRESSURE: 136 MMHG | HEIGHT: 64 IN | BODY MASS INDEX: 26.46 KG/M2 | OXYGEN SATURATION: 100 % | HEART RATE: 69 BPM | WEIGHT: 155 LBS

## 2022-11-21 DIAGNOSIS — G50.0 TRIGEMINAL NEURALGIA: Primary | ICD-10-CM

## 2022-11-21 PROCEDURE — 99214 OFFICE O/P EST MOD 30 MIN: CPT | Performed by: FAMILY MEDICINE

## 2022-11-21 RX ORDER — TRAMADOL HYDROCHLORIDE 50 MG/1
50 TABLET ORAL EVERY 6 HOURS PRN
Qty: 20 TABLET | Refills: 0 | Status: SHIPPED | OUTPATIENT
Start: 2022-11-21 | End: 2022-11-26

## 2022-11-21 RX ORDER — INSULIN PMP CART,AUT,G6/7,CNTR
EACH SUBCUTANEOUS EVERY OTHER DAY
COMMUNITY
Start: 2022-08-18

## 2022-11-21 RX ORDER — CARBAMAZEPINE 200 MG/1
200 TABLET, EXTENDED RELEASE ORAL 2 TIMES DAILY
Qty: 60 TABLET | Refills: 3 | Status: SHIPPED | OUTPATIENT
Start: 2022-11-21

## 2022-11-21 SDOH — ECONOMIC STABILITY: FOOD INSECURITY: WITHIN THE PAST 12 MONTHS, THE FOOD YOU BOUGHT JUST DIDN'T LAST AND YOU DIDN'T HAVE MONEY TO GET MORE.: NEVER TRUE

## 2022-11-21 SDOH — ECONOMIC STABILITY: FOOD INSECURITY: WITHIN THE PAST 12 MONTHS, YOU WORRIED THAT YOUR FOOD WOULD RUN OUT BEFORE YOU GOT MONEY TO BUY MORE.: NEVER TRUE

## 2022-11-21 ASSESSMENT — PATIENT HEALTH QUESTIONNAIRE - PHQ9
SUM OF ALL RESPONSES TO PHQ QUESTIONS 1-9: 0
SUM OF ALL RESPONSES TO PHQ9 QUESTIONS 1 & 2: 0
SUM OF ALL RESPONSES TO PHQ QUESTIONS 1-9: 0
2. FEELING DOWN, DEPRESSED OR HOPELESS: 0
SUM OF ALL RESPONSES TO PHQ QUESTIONS 1-9: 0
1. LITTLE INTEREST OR PLEASURE IN DOING THINGS: 0
SUM OF ALL RESPONSES TO PHQ QUESTIONS 1-9: 0

## 2022-11-21 ASSESSMENT — SOCIAL DETERMINANTS OF HEALTH (SDOH): HOW HARD IS IT FOR YOU TO PAY FOR THE VERY BASICS LIKE FOOD, HOUSING, MEDICAL CARE, AND HEATING?: NOT HARD AT ALL

## 2022-11-21 NOTE — PROGRESS NOTES
Palak Yap (:  1980) is a 40 y.o. female,Established patient, here for evaluation of the following chief complaint(s):  Headache (Pain since tues) and Facial Pain (Right side of face.)         ASSESSMENT/PLAN:  1. Trigeminal neuralgia  -     carBAMazepine (TEGRETOL-XR) 200 MG extended release tablet; Take 1 tablet by mouth 2 times daily, Disp-60 tablet, R-3Normal  -     [address]  -     traMADol (ULTRAM) 50 MG tablet; Take 1 tablet by mouth every 6 hours as needed for Pain for up to 5 days. Intended supply: 5 days. Take lowest dose possible to manage pain, Disp-20 tablet, R-0Normal  1. Symptoms consistent with trigeminal neuralgia. Will place on carbamazepine and tramadol for now. Will refer to neurology for evaluation. Warned patient that she may need an MRI. Hopefully carbamazepine and tramadol combination will help relieve her pain. Return if symptoms worsen or fail to improve. Celexa SUBJECTIVE/OBJECTIVE:  78-year-old female with right facial pain. Was seen in urgent care and diagnosed with possible shingles. Patient was given acyclovir. As per patient never had chickenpox. No rash noted. Patient was seen by an eye doctor with no findings. Patient says that touch makes the pain worse. Pain described as lancinating. Pain over the forehead and the maxillary area. No pain in mandibular area. Tried NSAIDs with no relief. Having a hard time putting any pressure on the right side of her face especially when sleeping because it increases the pain. Review of Systems       Objective   Physical Exam  Constitutional:       General: She is in acute distress. Appearance: Normal appearance. She is normal weight. HENT:      Head: Normocephalic. Comments: Tenderness on touching right maxillary and right forehead. No rash noted. No redness of the eye not the  Neurological:      Mental Status: She is alert.                 An electronic signature was used to authenticate this note.     --France Padgett MD

## 2022-11-22 ENCOUNTER — HOSPITAL ENCOUNTER (OUTPATIENT)
Dept: PHYSICAL THERAPY | Age: 42
Setting detail: RECURRING SERIES
Discharge: HOME | End: 2022-11-25
Payer: COMMERCIAL

## 2022-11-22 PROCEDURE — 97110 THERAPEUTIC EXERCISES: CPT

## 2022-11-22 PROCEDURE — 97140 MANUAL THERAPY 1/> REGIONS: CPT

## 2022-11-22 NOTE — PROGRESS NOTES
Jennifer Cisneros  : 1980  Primary: Progressive  Secondary: Diane @ [address] [place] [address] Way [address]  Phone: [phone]  Fax: [phone] Plan Frequency: 1x per week. Schedule dependent. Plan of Care/Certification Expiration Date: 22      PT Visit Info: Total # of Visits to Date: 13        OUTPATIENT PHYSICAL THERAPY:OP NOTE TYPE: Treatment Note 2022       Episode  }Appt Desk             Treatment Diagnosis:  Cervicalgia [M54.2]  Medical/Referring Diagnosis:  Cervicalgia [M54.2]  Referring Physician:  Cipriano Medina MD MD Orders:  PT Eval and Treat. Hand specialist 2022  Date of Onset:  Onset Date: 22     Allergies:   Sulfamethoxazole-trimethoprim, Peppermint oil, Oxycodone-acetaminophen, and Sulfa antibiotics  Restrictions/Precautions:  Restrictions/Precautions: None  No data recorded   Interventions Planned (Treatment may consist of any combination of the following):    Current Treatment Recommendations: Strengthening; ROM; Neuromuscular re-education; Manual Therapy - Soft Tissue Mobilization; Pain management; Manual Therapy - Joint Manipulation; Home exercise program; Modalities; Integrated dry needling; Therapeutic activities     Subjective Comments:  neck and hand are tolerating job actiities. Recent diagnosis of trigemenal neuralgia and tensing up from this pain has made neck tight. Initial:}     /10Post Session:        /10 no VAS but neck and hand pain generally at 0/10  Medications Last Reviewed:  2022  Carbamazepine and tramadol  Updated Objective Findings:  see discharge summary    Treatment   Therapeutic Exercises: ( 8 min)  Thoracic extension over 1/2 foam roller  Standing lat dorsi stretch  Pt education for how to have her  work with her on positional release to her neck.    Manual Therapy (32 minutes)   -- OCB release  --  myofascial release and stretch for posterior cervical   -- positional release for CHUCKY upper trap with overpressure to trigger point--  -- cervical traction   Modalities: (0 minutes): moist heat applied to R face to decrease pain of trigeminal nerve    Treatment/Session Summary:    Treatment Assessment:     Communication/Consultation:  None today  Equipment provided today:  None  Recommendations/Intent for next treatment session: to be discharged to [facility] today. Total Treatment Billable Duration:  40 minutes  Time In: 1300  Time Out: [address], PT       Charge Capture  }Post Session Pain  PT Visit Info  [employer] Portal  MD Guidelines  Scanned Media  Benefits  MyChart    No future appointments.
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
## 2022-11-25 ENCOUNTER — PATIENT MESSAGE (OUTPATIENT)
Dept: INTERNAL MEDICINE CLINIC | Facility: CLINIC | Age: 42
End: 2022-11-25

## 2022-11-29 RX ORDER — NAPROXEN 500 MG/1
500 TABLET ORAL 2 TIMES DAILY WITH MEALS
Qty: 60 TABLET | Refills: 0 | Status: SHIPPED | OUTPATIENT
Start: 2022-11-29 | End: 2023-11-29

## 2022-12-26 ENCOUNTER — PATIENT MESSAGE (OUTPATIENT)
Dept: INTERNAL MEDICINE CLINIC | Facility: CLINIC | Age: 42
End: 2022-12-26

## 2022-12-27 NOTE — TELEPHONE ENCOUNTER
From: Kimberley Pyle  To: Dr. Jonas Filter: 12/26/2022 2:59 PM EST  Subject: Refill a med    Unsure what this means seeing I have 3 refills. Am I still supposed to take it? It seems to be working well.  Also no one has called for neurology

## 2023-03-07 LAB
CHOLESTEROL, TOTAL: 181 MG/DL
CHOLESTEROL/HDL RATIO: 2.1
HDLC SERPL-MCNC: 87 MG/DL (ref 35–70)
LDL CHOLESTEROL CALCULATED: 84 MG/DL (ref 0–160)
NONHDLC SERPL-MCNC: ABNORMAL MG/DL
TRIGL SERPL-MCNC: 52 MG/DL
VLDLC SERPL CALC-MCNC: 10 MG/DL

## 2023-04-18 NOTE — ED PROVIDER NOTES
Diabetic since age 13. No recent changes in dictations. Been eating entirely normal.  Does not have an insulin pump. Here with episode this evening of hypoglycemia which she checked her blood sugars and a sequence after eating that started out at 70 and down to 60 and a single 1 probably was close to 30 before rebounding to 85. She is sick. She has no vomiting diarrhea or other GI changes. Does not have any history of inadvertently taking extra dose of any of her medications. States that her blood sugars tend to run about 140-150 baseline. She did not take any insulin with her midday meal.    The history is provided by the patient. Low Blood Sugar    This is a new problem. The problem has been resolved. Pertinent negatives include no confusion and no seizures. Her past medical history is significant for diabetes. Her past medical history does not include CVA or dementia.         Past Medical History:   Diagnosis Date    Asthma     Allergy-Induced    Perennial allergic rhinitis with seasonal variation     Type 1 diabetes mellitus (HCC)        Past Surgical History:   Procedure Laterality Date    HX  SECTION      HX TYMPANOSTOMY      HX WISDOM TEETH EXTRACTION           Family History:   Problem Relation Age of Onset    Diabetes Father         Type 1    Heart Attack Father 32    Cancer Maternal Grandfather         Lung    Heart Disease Maternal Grandfather     Heart Disease Paternal Grandfather     No Known Problems Mother        Social History     Socioeconomic History    Marital status: SINGLE     Spouse name: Not on file    Number of children: Not on file    Years of education: Not on file    Highest education level: Not on file   Occupational History    Not on file   Social Needs    Financial resource strain: Not on file    Food insecurity:     Worry: Not on file     Inability: Not on file    Transportation needs:     Medical: Not on file     Non-medical: Not on file Tobacco Use    Smoking status: Never Smoker    Smokeless tobacco: Never Used   Substance and Sexual Activity    Alcohol use: No    Drug use: No    Sexual activity: Yes     Partners: Male   Lifestyle    Physical activity:     Days per week: Not on file     Minutes per session: Not on file    Stress: Not on file   Relationships    Social connections:     Talks on phone: Not on file     Gets together: Not on file     Attends Religion service: Not on file     Active member of club or organization: Not on file     Attends meetings of clubs or organizations: Not on file     Relationship status: Not on file    Intimate partner violence:     Fear of current or ex partner: Not on file     Emotionally abused: Not on file     Physically abused: Not on file     Forced sexual activity: Not on file   Other Topics Concern    Not on file   Social History Narrative    Not on file         ALLERGIES: Mint and Percocet [oxycodone-acetaminophen]    Review of Systems   Constitutional: Negative for chills and fever. HENT: Negative. Respiratory: Negative. Cardiovascular: Negative. Gastrointestinal: Negative for diarrhea, nausea and vomiting. Genitourinary: Negative for dysuria. Skin: Negative for wound. Neurological: Negative for seizures. Psychiatric/Behavioral: Negative for behavioral problems, confusion and decreased concentration. All other systems reviewed and are negative. Vitals:    09/03/19 2055   BP: 126/78   Pulse: 86   Resp: 18   Temp: 98.1 °F (36.7 °C)   SpO2: 100%   Weight: 65.8 kg (145 lb)   Height: 5' 4\" (1.626 m)            Physical Exam   Constitutional: She appears well-developed and well-nourished. No distress. HENT:   Head: Atraumatic. Right Ear: External ear normal.   Left Ear: External ear normal.   Nose: Nose normal.   Mouth/Throat: Oropharynx is clear and moist. No oropharyngeal exudate. Eyes: No scleral icterus. Neck: Neck supple.    Cardiovascular: Normal rate and intact distal pulses. Pulmonary/Chest: Effort normal. No respiratory distress. Abdominal: Soft. She exhibits no distension. There is no tenderness. Musculoskeletal: She exhibits no edema. Lymphadenopathy:     She has no cervical adenopathy. Neurological: She is alert. No sensory deficit. She exhibits normal muscle tone. Skin: Skin is warm and dry. She is not diaphoretic. Psychiatric: Thought content normal.   Nursing note and vitals reviewed. MDM  Number of Diagnoses or Management Options  Hypoglycemia:   Diagnosis management comments: Recurring low glucose with no med change or source identified. No pump. Certain she took correct dose and no extra       Amount and/or Complexity of Data Reviewed  Clinical lab tests: ordered and reviewed  Decide to obtain previous medical records or to obtain history from someone other than the patient: yes    Risk of Complications, Morbidity, and/or Mortality  Presenting problems: moderate  Diagnostic procedures: low  Management options: moderate    Patient Progress  Patient progress: stable         Procedures    Recent Results (from the past 12 hour(s))   GLUCOSE, POC    Collection Time: 09/03/19  8:54 PM   Result Value Ref Range    Glucose (POC) 93 65 - 100 mg/dL   CBC WITH AUTOMATED DIFF    Collection Time: 09/03/19  9:10 PM   Result Value Ref Range    WBC 9.1 4.3 - 11.1 K/uL    RBC 4.74 4.05 - 5.2 M/uL    HGB 14.6 11.7 - 15.4 g/dL    HCT 42.9 35.8 - 46.3 %    MCV 90.5 79.6 - 97.8 FL    MCH 30.8 26.1 - 32.9 PG    MCHC 34.0 31.4 - 35.0 g/dL    RDW 11.8 (L) 11.9 - 14.6 %    PLATELET 859 019 - 692 K/uL    MPV 10.4 9.4 - 12.3 FL    ABSOLUTE NRBC 0.00 0.0 - 0.2 K/uL    DF AUTOMATED      NEUTROPHILS 58 43 - 78 %    LYMPHOCYTES 28 13 - 44 %    MONOCYTES 10 4.0 - 12.0 %    EOSINOPHILS 3 0.5 - 7.8 %    BASOPHILS 0 0.0 - 2.0 %    IMMATURE GRANULOCYTES 0 0.0 - 5.0 %    ABS. NEUTROPHILS 5.3 1.7 - 8.2 K/UL    ABS. LYMPHOCYTES 2.6 0.5 - 4.6 K/UL    ABS.  MONOCYTES 0.9 0.1 - 1.3 K/UL    ABS. EOSINOPHILS 0.3 0.0 - 0.8 K/UL    ABS. BASOPHILS 0.0 0.0 - 0.2 K/UL    ABS. IMM.  GRANS. 0.0 0.0 - 0.5 K/UL   METABOLIC PANEL, BASIC    Collection Time: 09/03/19  9:10 PM   Result Value Ref Range    Sodium 141 136 - 145 mmol/L    Potassium 3.6 3.5 - 5.1 mmol/L    Chloride 102 98 - 107 mmol/L    CO2 27 21 - 32 mmol/L    Anion gap 12 7 - 16 mmol/L    Glucose 95 65 - 100 mg/dL    BUN 19 6 - 23 MG/DL    Creatinine 0.66 0.6 - 1.0 MG/DL    GFR est AA >60 >60 ml/min/1.73m2    GFR est non-AA >60 >60 ml/min/1.73m2    Calcium 9.0 8.3 - 10.4 MG/DL Yes

## 2023-05-08 ENCOUNTER — OFFICE VISIT (OUTPATIENT)
Dept: INTERNAL MEDICINE CLINIC | Facility: CLINIC | Age: 43
End: 2023-05-08
Payer: COMMERCIAL

## 2023-05-08 VITALS
DIASTOLIC BLOOD PRESSURE: 65 MMHG | HEIGHT: 64 IN | SYSTOLIC BLOOD PRESSURE: 121 MMHG | OXYGEN SATURATION: 96 % | WEIGHT: 152 LBS | HEART RATE: 71 BPM | BODY MASS INDEX: 25.95 KG/M2

## 2023-05-08 DIAGNOSIS — M79.672 LEFT FOOT PAIN: Primary | ICD-10-CM

## 2023-05-08 PROCEDURE — 99213 OFFICE O/P EST LOW 20 MIN: CPT | Performed by: FAMILY MEDICINE

## 2023-05-08 NOTE — PROGRESS NOTES
Waldemar Roland (:  1980) is a 40 y.o. female,Established patient, here for evaluation of the following chief complaint(s): Foot Pain (Painful lump on bottom of left foot x a few weeks )         ASSESSMENT/PLAN:  1. Left foot pain  -     XR FOOT LEFT (2 VIEWS); Future  -     CASA - Rafa Fong DPM, [facility], [place]    Left foot pain. Callus with possible Ponce's neuroma. Will refer to podiatry. We will do x-ray to screen for foreign body. Return if symptoms worsen or fail to improve. Subjective   SUBJECTIVE/OBJECTIVE:  45-year-old female comes in because of left foot pain. Has noticed a lump on the bottom of her left foot. Unable to walk barefoot. No injury. Denies stepping on anything. Has mass and callus noted. Review of Systems       Objective   Physical Exam  Constitutional:       Appearance: Normal appearance. She is normal weight. HENT:      Head: Normocephalic. Musculoskeletal:         General: Swelling and tenderness present. Comments: Left foot: Callus noted over distal first metatarsal area. Also noted to have a mass over sole of the foot at the second metatarsal bone. Possible Ponce's neuroma. No foreign body noted. Neurological:      General: No focal deficit present. Mental Status: She is alert. Psychiatric:         Mood and Affect: Mood normal.                An electronic signature was used to authenticate this note.     --Iram Khan MD

## 2023-05-30 ENCOUNTER — HOSPITAL ENCOUNTER (EMERGENCY)
Age: 43
Discharge: HOME | End: 2023-05-30
Attending: EMERGENCY MEDICINE
Payer: COMMERCIAL

## 2023-05-30 ENCOUNTER — APPOINTMENT (OUTPATIENT)
Dept: CT IMAGING | Age: 43
End: 2023-05-30
Payer: COMMERCIAL

## 2023-05-30 VITALS
HEIGHT: 64 IN | WEIGHT: 151 LBS | TEMPERATURE: 98.5 F | HEART RATE: 79 BPM | DIASTOLIC BLOOD PRESSURE: 68 MMHG | BODY MASS INDEX: 25.78 KG/M2 | RESPIRATION RATE: 14 BRPM | SYSTOLIC BLOOD PRESSURE: 106 MMHG | OXYGEN SATURATION: 100 %

## 2023-05-30 DIAGNOSIS — S39.012A STRAIN OF LUMBAR REGION, INITIAL ENCOUNTER: Primary | ICD-10-CM

## 2023-05-30 DIAGNOSIS — M62.838 SPASM OF MUSCLE: ICD-10-CM

## 2023-05-30 LAB
ALBUMIN SERPL-MCNC: 3.9 G/DL (ref 3.5–5)
ALBUMIN/GLOB SERPL: 1.3 (ref 0.4–1.6)
ALP SERPL-CCNC: 63 U/L (ref 50–130)
ALT SERPL-CCNC: 16 U/L (ref 12–65)
ANION GAP SERPL CALC-SCNC: 6 MMOL/L (ref 2–11)
AST SERPL-CCNC: 12 U/L (ref 15–37)
BASOPHILS # BLD: 0.1 K/UL (ref 0–0.2)
BASOPHILS NFR BLD: 1 % (ref 0–2)
BILIRUB SERPL-MCNC: 0.6 MG/DL (ref 0.2–1.1)
BUN SERPL-MCNC: 11 MG/DL (ref 6–23)
CALCIUM SERPL-MCNC: 9.2 MG/DL (ref 8.3–10.4)
CHLORIDE SERPL-SCNC: 106 MMOL/L (ref 101–110)
CO2 SERPL-SCNC: 27 MMOL/L (ref 21–32)
CREAT SERPL-MCNC: 0.84 MG/DL (ref 0.6–1)
DIFFERENTIAL METHOD BLD: NORMAL
EOSINOPHIL # BLD: 0.2 K/UL (ref 0–0.8)
EOSINOPHIL NFR BLD: 2 % (ref 0.5–7.8)
ERYTHROCYTE [DISTWIDTH] IN BLOOD BY AUTOMATED COUNT: 12.5 % (ref 11.9–14.6)
GLOBULIN SER CALC-MCNC: 3.1 G/DL (ref 2.8–4.5)
GLUCOSE SERPL-MCNC: 256 MG/DL (ref 65–100)
HCG UR QL: NEGATIVE
HCT VFR BLD AUTO: 40.2 % (ref 35.8–46.3)
HGB BLD-MCNC: 13 G/DL (ref 11.7–15.4)
IMM GRANULOCYTES # BLD AUTO: 0 K/UL (ref 0–0.5)
IMM GRANULOCYTES NFR BLD AUTO: 0 % (ref 0–5)
LIPASE SERPL-CCNC: 46 U/L (ref 73–393)
LYMPHOCYTES # BLD: 1.4 K/UL (ref 0.5–4.6)
LYMPHOCYTES NFR BLD: 19 % (ref 13–44)
MCH RBC QN AUTO: 29.6 PG (ref 26.1–32.9)
MCHC RBC AUTO-ENTMCNC: 32.3 G/DL (ref 31.4–35)
MCV RBC AUTO: 91.6 FL (ref 82–102)
MONOCYTES # BLD: 0.7 K/UL (ref 0.1–1.3)
MONOCYTES NFR BLD: 10 % (ref 4–12)
NEUTS SEG # BLD: 5 K/UL (ref 1.7–8.2)
NEUTS SEG NFR BLD: 68 % (ref 43–78)
NRBC # BLD: 0 K/UL (ref 0–0.2)
PLATELET # BLD AUTO: 237 K/UL (ref 150–450)
PMV BLD AUTO: 9.7 FL (ref 9.4–12.3)
POTASSIUM SERPL-SCNC: 4.3 MMOL/L (ref 3.5–5.1)
PROT SERPL-MCNC: 7 G/DL (ref 6.3–8.2)
RBC # BLD AUTO: 4.39 M/UL (ref 4.05–5.2)
SODIUM SERPL-SCNC: 139 MMOL/L (ref 133–143)
WBC # BLD AUTO: 7.3 K/UL (ref 4.3–11.1)

## 2023-05-30 PROCEDURE — 74176 CT ABD & PELVIS W/O CONTRAST: CPT

## 2023-05-30 PROCEDURE — 83690 ASSAY OF LIPASE: CPT

## 2023-05-30 PROCEDURE — 6360000002 HC RX W HCPCS: Performed by: EMERGENCY MEDICINE

## 2023-05-30 PROCEDURE — 99284 EMERGENCY DEPT VISIT MOD MDM: CPT

## 2023-05-30 PROCEDURE — 96374 THER/PROPH/DIAG INJ IV PUSH: CPT

## 2023-05-30 PROCEDURE — 81025 URINE PREGNANCY TEST: CPT

## 2023-05-30 PROCEDURE — 80053 COMPREHEN METABOLIC PANEL: CPT

## 2023-05-30 PROCEDURE — 2580000003 HC RX 258: Performed by: EMERGENCY MEDICINE

## 2023-05-30 PROCEDURE — 85025 COMPLETE CBC W/AUTO DIFF WBC: CPT

## 2023-05-30 RX ORDER — 0.9 % SODIUM CHLORIDE 0.9 %
1000 INTRAVENOUS SOLUTION INTRAVENOUS
Status: COMPLETED | OUTPATIENT
Start: 2023-05-30 | End: 2023-05-30

## 2023-05-30 RX ORDER — KETOROLAC TROMETHAMINE 30 MG/ML
15 INJECTION, SOLUTION INTRAMUSCULAR; INTRAVENOUS ONCE
Status: COMPLETED | OUTPATIENT
Start: 2023-05-30 | End: 2023-05-30

## 2023-05-30 RX ORDER — CYCLOBENZAPRINE HCL 5 MG
5 TABLET ORAL NIGHTLY PRN
Qty: 10 TABLET | Refills: 0 | Status: SHIPPED | OUTPATIENT
Start: 2023-05-30 | End: 2023-06-09

## 2023-05-30 RX ORDER — NAPROXEN 500 MG/1
500 TABLET ORAL 2 TIMES DAILY WITH MEALS
Qty: 40 TABLET | Refills: 0 | Status: SHIPPED | OUTPATIENT
Start: 2023-05-30 | End: 2023-06-19

## 2023-05-30 RX ORDER — LIDOCAINE 50 MG/G
1 PATCH TOPICAL DAILY
Qty: 10 PATCH | Refills: 0 | Status: SHIPPED | OUTPATIENT
Start: 2023-05-30 | End: 2023-06-09

## 2023-05-30 RX ADMIN — KETOROLAC TROMETHAMINE 15 MG: 30 INJECTION, SOLUTION INTRAMUSCULAR; INTRAVENOUS at 07:45

## 2023-05-30 RX ADMIN — SODIUM CHLORIDE 1000 ML: 9 INJECTION, SOLUTION INTRAVENOUS at 07:45

## 2023-05-30 ASSESSMENT — PAIN - FUNCTIONAL ASSESSMENT: PAIN_FUNCTIONAL_ASSESSMENT: 0-10

## 2023-05-30 ASSESSMENT — PAIN SCALES - GENERAL: PAINLEVEL_OUTOF10: 9

## 2023-05-30 NOTE — ED NOTES
I have reviewed discharge instructions with the patient. The patient verbalized understanding. Patient left ED via Discharge Method: ambulatory to Home with self. Opportunity for questions and clarification provided. Patient given 3 scripts. To continue your aftercare when you leave the hospital, you may receive an automated call from our care team to check in on how you are doing. This is a free service and part of our promise to provide the best care and service to meet your aftercare needs.  If you have questions, or wish to unsubscribe from this service please call [phone]. Thank you for Choosing our [employer] [facility] Emergency Department. [facility]  05/30/23 [id number]

## 2023-05-30 NOTE — ED PROVIDER NOTES
turgor palpated. PSYCHIATRIC: Alert and oriented. Appropriate behavior and judgment. Procedures     Procedures    Orders Placed This Encounter   Procedures    CT ABDOMEN PELVIS RENAL STONE    CBC with Diff    CMP    Lipase    Diet NPO    POCT Urine Dipstick    POC Pregnancy Urine Qual    POC Pregnancy Urine Qual    Saline lock IV        Medications   0.9 % sodium chloride bolus (1,000 mLs IntraVENous New Bag 23)   ketorolac (TORADOL) injection 15 mg (15 mg IntraVENous Given 23)       New Prescriptions    CYCLOBENZAPRINE (FLEXERIL) 5 MG TABLET    Take 1 tablet by mouth nightly as needed for Muscle spasms    LIDOCAINE (LIDODERM) 5 %    Place 1 patch onto the skin daily for 10 days 12 hours on, 12 hours off.     NAPROXEN (NAPROSYN) 500 MG TABLET    Take 1 tablet by mouth 2 times daily (with meals) for 20 days        Past Medical History:   Diagnosis Date    Asthma     Allergy-Induced    Ovarian cyst     Pain involving joint of finger of left hand     Perennial allergic rhinitis with seasonal variation     Type 1 diabetes mellitus (HCC)         Past Surgical History:   Procedure Laterality Date     SECTION  2006    FINGER TRIGGER RELEASE Right     HAND SURGERY Left     two spots removed from left hand    TUMOR EXCISION Left     left pinky finger    TYMPANOSTOMY TUBE PLACEMENT      WISDOM TOOTH EXTRACTION          Social History     Socioeconomic History    Marital status: Single     Spouse name: None    Number of children: None    Years of education: None    Highest education level: None   Tobacco Use    Smoking status: Never    Smokeless tobacco: Never   Substance and Sexual Activity    Alcohol use: No    Drug use: No     Social Determinants of Health     Financial Resource Strain: Low Risk     Difficulty of Paying Living Expenses: Not hard at all   Food Insecurity: No Food Insecurity    Worried About Running Out of Food in the Last Year: Never true    Ran Out of Food in the Last Year:

## 2023-05-30 NOTE — ED TRIAGE NOTES
Pt ambulatory to triage with c/o right ovarian cyst pain. Pt reports she was seen at [facility] ER on Sunday for the same where she was diagnosed with the ovarian cyst. Pt c/o continued mid back pain that radiates to her abd with nausea.

## 2023-05-30 NOTE — DISCHARGE INSTRUCTIONS
Take the NSAID as prescribed with food for the next week then as needed. Do the stretching exercises like we discussed. Drink 1 to 2 L of water daily. Take the Flexeril at nighttime before going to bed as needed.       Use the Lidoderm patch over the affected area 12 hours on and a minimum of 12 hours off before putting a new patch on your body

## 2023-05-30 NOTE — ED NOTES
Pt to CT at this time. Fredderick Bucks Burna Moritz, [facility]  05/30/23 [id number]

## 2023-06-05 ENCOUNTER — APPOINTMENT (OUTPATIENT)
Dept: ULTRASOUND IMAGING | Age: 43
End: 2023-06-05
Payer: COMMERCIAL

## 2023-06-05 ENCOUNTER — HOSPITAL ENCOUNTER (EMERGENCY)
Age: 43
Discharge: HOME | End: 2023-06-05
Attending: EMERGENCY MEDICINE
Payer: COMMERCIAL

## 2023-06-05 VITALS
HEART RATE: 75 BPM | WEIGHT: 150 LBS | BODY MASS INDEX: 25.61 KG/M2 | HEIGHT: 64 IN | OXYGEN SATURATION: 100 % | DIASTOLIC BLOOD PRESSURE: 82 MMHG | SYSTOLIC BLOOD PRESSURE: 143 MMHG | RESPIRATION RATE: 16 BRPM | TEMPERATURE: 98.6 F

## 2023-06-05 DIAGNOSIS — R10.2 PELVIC PAIN: Primary | ICD-10-CM

## 2023-06-05 LAB
ALBUMIN SERPL-MCNC: 4.3 G/DL (ref 3.5–5)
ALBUMIN/GLOB SERPL: 1.5 (ref 0.4–1.6)
ALP SERPL-CCNC: 59 U/L (ref 50–130)
ALT SERPL-CCNC: 16 U/L (ref 12–65)
ANION GAP SERPL CALC-SCNC: 6 MMOL/L (ref 2–11)
AST SERPL-CCNC: 14 U/L (ref 15–37)
BASOPHILS # BLD: 0 K/UL (ref 0–0.2)
BASOPHILS NFR BLD: 1 % (ref 0–2)
BILIRUB SERPL-MCNC: 0.6 MG/DL (ref 0.2–1.1)
BUN SERPL-MCNC: 16 MG/DL (ref 6–23)
CALCIUM SERPL-MCNC: 8.8 MG/DL (ref 8.3–10.4)
CHLORIDE SERPL-SCNC: 108 MMOL/L (ref 101–110)
CO2 SERPL-SCNC: 29 MMOL/L (ref 21–32)
CREAT SERPL-MCNC: 0.72 MG/DL (ref 0.6–1)
DIFFERENTIAL METHOD BLD: NORMAL
EOSINOPHIL # BLD: 0.2 K/UL (ref 0–0.8)
EOSINOPHIL NFR BLD: 3 % (ref 0.5–7.8)
ERYTHROCYTE [DISTWIDTH] IN BLOOD BY AUTOMATED COUNT: 12.5 % (ref 11.9–14.6)
GLOBULIN SER CALC-MCNC: 2.9 G/DL (ref 2.8–4.5)
GLUCOSE SERPL-MCNC: 96 MG/DL (ref 65–100)
HCG UR QL: NEGATIVE
HCT VFR BLD AUTO: 38.8 % (ref 35.8–46.3)
HGB BLD-MCNC: 12.3 G/DL (ref 11.7–15.4)
IMM GRANULOCYTES # BLD AUTO: 0 K/UL (ref 0–0.5)
IMM GRANULOCYTES NFR BLD AUTO: 0 % (ref 0–5)
LIPASE SERPL-CCNC: 36 U/L (ref 73–393)
LYMPHOCYTES # BLD: 1.6 K/UL (ref 0.5–4.6)
LYMPHOCYTES NFR BLD: 24 % (ref 13–44)
MCH RBC QN AUTO: 28.9 PG (ref 26.1–32.9)
MCHC RBC AUTO-ENTMCNC: 31.7 G/DL (ref 31.4–35)
MCV RBC AUTO: 91.3 FL (ref 82–102)
MONOCYTES # BLD: 0.7 K/UL (ref 0.1–1.3)
MONOCYTES NFR BLD: 10 % (ref 4–12)
NEUTS SEG # BLD: 4.2 K/UL (ref 1.7–8.2)
NEUTS SEG NFR BLD: 62 % (ref 43–78)
NRBC # BLD: 0 K/UL (ref 0–0.2)
PLATELET # BLD AUTO: 238 K/UL (ref 150–450)
PMV BLD AUTO: 9.9 FL (ref 9.4–12.3)
POTASSIUM SERPL-SCNC: 3.8 MMOL/L (ref 3.5–5.1)
PROT SERPL-MCNC: 7.2 G/DL (ref 6.3–8.2)
RBC # BLD AUTO: 4.25 M/UL (ref 4.05–5.2)
SODIUM SERPL-SCNC: 143 MMOL/L (ref 133–143)
WBC # BLD AUTO: 6.7 K/UL (ref 4.3–11.1)

## 2023-06-05 PROCEDURE — 80053 COMPREHEN METABOLIC PANEL: CPT

## 2023-06-05 PROCEDURE — 85025 COMPLETE CBC W/AUTO DIFF WBC: CPT

## 2023-06-05 PROCEDURE — 81025 URINE PREGNANCY TEST: CPT

## 2023-06-05 PROCEDURE — 96376 TX/PRO/DX INJ SAME DRUG ADON: CPT

## 2023-06-05 PROCEDURE — 96374 THER/PROPH/DIAG INJ IV PUSH: CPT

## 2023-06-05 PROCEDURE — 96375 TX/PRO/DX INJ NEW DRUG ADDON: CPT

## 2023-06-05 PROCEDURE — 76830 TRANSVAGINAL US NON-OB: CPT

## 2023-06-05 PROCEDURE — 76705 ECHO EXAM OF ABDOMEN: CPT

## 2023-06-05 PROCEDURE — 6360000002 HC RX W HCPCS: Performed by: EMERGENCY MEDICINE

## 2023-06-05 PROCEDURE — 83690 ASSAY OF LIPASE: CPT

## 2023-06-05 PROCEDURE — 99284 EMERGENCY DEPT VISIT MOD MDM: CPT

## 2023-06-05 RX ORDER — MORPHINE SULFATE 4 MG/ML
4 INJECTION INTRAVENOUS ONCE
Status: COMPLETED | OUTPATIENT
Start: 2023-06-05 | End: 2023-06-05

## 2023-06-05 RX ORDER — HYDROCODONE BITARTRATE AND ACETAMINOPHEN 10; 325 MG/1; MG/1
0.5 TABLET ORAL EVERY 6 HOURS PRN
Qty: 6 TABLET | Refills: 0 | Status: SHIPPED | OUTPATIENT
Start: 2023-06-05 | End: 2023-06-09

## 2023-06-05 RX ORDER — ONDANSETRON 2 MG/ML
4 INJECTION INTRAMUSCULAR; INTRAVENOUS
Status: COMPLETED | OUTPATIENT
Start: 2023-06-05 | End: 2023-06-05

## 2023-06-05 RX ADMIN — MORPHINE SULFATE 4 MG: 4 INJECTION, SOLUTION INTRAMUSCULAR; INTRAVENOUS at 13:01

## 2023-06-05 RX ADMIN — ONDANSETRON 4 MG: 2 INJECTION INTRAMUSCULAR; INTRAVENOUS at 12:58

## 2023-06-05 RX ADMIN — MORPHINE SULFATE 4 MG: 4 INJECTION, SOLUTION INTRAMUSCULAR; INTRAVENOUS at 15:56

## 2023-06-05 ASSESSMENT — PAIN DESCRIPTION - LOCATION
LOCATION: ABDOMEN
LOCATION: ABDOMEN
LOCATION: ABDOMEN;FLANK
LOCATION: ABDOMEN

## 2023-06-05 ASSESSMENT — PAIN SCALES - GENERAL
PAINLEVEL_OUTOF10: 9
PAINLEVEL_OUTOF10: 9
PAINLEVEL_OUTOF10: 6
PAINLEVEL_OUTOF10: 10

## 2023-06-05 ASSESSMENT — LIFESTYLE VARIABLES
HOW OFTEN DO YOU HAVE A DRINK CONTAINING ALCOHOL: 2-3 TIMES A WEEK
HOW MANY STANDARD DRINKS CONTAINING ALCOHOL DO YOU HAVE ON A TYPICAL DAY: 1 OR 2

## 2023-06-05 ASSESSMENT — PAIN DESCRIPTION - ORIENTATION: ORIENTATION: RIGHT

## 2023-06-05 ASSESSMENT — PAIN DESCRIPTION - FREQUENCY: FREQUENCY: CONTINUOUS

## 2023-06-05 ASSESSMENT — PAIN DESCRIPTION - DESCRIPTORS: DESCRIPTORS: CRAMPING

## 2023-06-05 ASSESSMENT — PAIN - FUNCTIONAL ASSESSMENT: PAIN_FUNCTIONAL_ASSESSMENT: 0-10

## 2023-06-05 NOTE — ED TRIAGE NOTES
Has history of right ovarian cyst. Is having severe lower right quad pain. Also some vaginal bleeding that looks like \"old blood\". Pain and bleeding started yesterday. Sent to ER by OB/GYN.

## 2023-06-05 NOTE — DISCHARGE INSTRUCTIONS
As we discussed, the exact cause of your symptoms is unknown at this time. The ultrasound your gallbladder is unremarkable and the ultrasound of your pelvis does show a 3 cm cyst on your right ovary consistent with a CT scan. Supportive follow-up with OB/GYN for further evaluation and further diagnostic work-up. Otherwise return to the emergency department for any worsening of your symptoms.

## 2023-06-05 NOTE — ED PROVIDER NOTES
Emergency Department Provider Note       PCP: Efrain Marroquin MD   Age: 40 y.o. Sex: female     DISPOSITION Decision To Discharge 06/05/2023 04:09:09 PM       ICD-10-CM    1. Pelvic pain  R10.2 HYDROcodone-acetaminophen (NORCO)  MG per tablet          Medical Decision Making     Complexity of Problems Addressed:  1 acute illness, differential for which includes potentially life-threatening conditions. Data Reviewed and Analyzed:  Category 1:   I independently ordered and reviewed each unique test.  I reviewed external records: provider visit note from PCP. I reviewed external records: provider visit note from outside specialist.  Orthopedic and endocrinology notes      Category 2:    I interpreted the US abd - no gallstones  I interpreted the US pelvis, no TOA, free fluid    Category 3: Discussion of management or test interpretation. Patient's abdominal exam is reassuring, her labs are unremarkable. Given her right upper quadrant tenderness, on ultrasound bradycardia was ordered which shows no evidence of acute cholecystitis or biliary stones. During the pelvic ultrasound, the patient had significant discomfort during the intravaginal portion. However, her urinalysis is unremarkable, she had no vaginal discharge no risk factors for STI. And her ultrasound shows no evidence of acute pelvic pathology with the exception of this 3 cm ovarian cyst that was noted previously on CT scan. I do not think repeat imaging with CT is indicated. Recommend close outpatient follow-up with OB/GYN. I have given her referral to outpatient GYN. We discussed return precautions, patient is comfortable this plan. She has been taking naproxen which was prescribed at her previous visit with no improvement in her discomfort.   Therefore will give us a very small prescription for higher creatinine which she has tolerated in the past.  ED Course as of 06/05/23 1615   Mon Jun 05, 2023   1507 Quadrant ultrasound
04:40

## 2023-06-05 NOTE — ED NOTES
I have reviewed discharge instructions with the patient. The patient verbalized understanding. Patient left ED via Discharge Method: ambulatory to Home with family member. Opportunity for questions and clarification provided. Patient given 1 scripts. To continue your aftercare when you leave the hospital, you may receive an automated call from our care team to check in on how you are doing. This is a free service and part of our promise to provide the best care and service to meet your aftercare needs.  If you have questions, or wish to unsubscribe from this service please call [phone]. Thank you for Choosing our [facility] Emergency Department.        Prateek Sol RN  06/05/23 0866

## 2023-06-16 LAB
AVERAGE GLUCOSE: NORMAL
HBA1C MFR BLD: 6.4 %

## 2023-08-28 ENCOUNTER — ANESTHESIA EVENT (OUTPATIENT)
Dept: SURGERY | Age: 43
End: 2023-08-28
Payer: COMMERCIAL

## 2023-08-28 NOTE — PROGRESS NOTES
Patient verified name and . Order for consent is found in EHR and matches case posting; patient verifies procedure. Type 2 surgery, Phone assessment complete. Orders were received. Labs per surgeon: CBC  Labs per anesthesia protocol: HGB, EKG    Patient answered medical/surgical history questions at their best of ability. All prior to admission medications documented in EPIC. Patient instructed to take the following medications the day of surgery according to anesthesia guidelines with a small sip of water: keep insulin pump at basal rate per anesthesia. Instructed to drink 4 oz of clear, sugary liquids if glucose low through night and call Preop @ [phone] in AM.  Hold all vitamins 7 days prior to surgery and NSAIDS 5 days prior to surgery. Prescription meds to hold:none    Patient instructed on the following:    > Arrive at [address] [place] [address], time of arrival to be called the day before by 1700  > NPO after midnight, unless otherwise indicated, including gum, mints, and ice chips  > Responsible adult must drive patient to the hospital, stay during surgery, and patient will need supervision 24 hours after anesthesia  > Use antibacterial soap in shower the night before surgery and on the morning of surgery  > All piercings must be removed prior to arrival.    > Leave all valuables (money and jewelry) at home but bring insurance card and ID on DOS.   > Do not wear make-up, nail polish, lotions, cologne, perfumes, powders, or oil on skin. Artificial nails are not permitted.

## 2023-08-29 ENCOUNTER — ANESTHESIA (OUTPATIENT)
Dept: SURGERY | Age: 43
End: 2023-08-29
Payer: COMMERCIAL

## 2023-08-29 ENCOUNTER — HOSPITAL ENCOUNTER (OUTPATIENT)
Age: 43
Discharge: HOME | End: 2023-08-30
Attending: OBSTETRICS & GYNECOLOGY | Admitting: OBSTETRICS & GYNECOLOGY
Payer: COMMERCIAL

## 2023-08-29 DIAGNOSIS — G89.18 POST-OP PAIN: ICD-10-CM

## 2023-08-29 DIAGNOSIS — Z01.818 PRE-OP TESTING: Primary | ICD-10-CM

## 2023-08-29 DIAGNOSIS — N80.9 ENDOMETRIOSIS DETERMINED BY LAPAROSCOPY: ICD-10-CM

## 2023-08-29 PROBLEM — N94.6 DYSMENORRHEA: Status: ACTIVE | Noted: 2023-08-29

## 2023-08-29 LAB
ABO + RH BLD: NORMAL
BLOOD GROUP ANTIBODIES SERPL: NORMAL
ERYTHROCYTE [DISTWIDTH] IN BLOOD BY AUTOMATED COUNT: 12.3 % (ref 11.9–14.6)
GLUCOSE BLD STRIP.AUTO-MCNC: 264 MG/DL (ref 65–100)
GLUCOSE BLD STRIP.AUTO-MCNC: 300 MG/DL (ref 65–100)
HCG UR QL: NEGATIVE
HCT VFR BLD AUTO: 44.1 % (ref 35.8–46.3)
HGB BLD-MCNC: 14.2 G/DL (ref 11.7–15.4)
MCH RBC QN AUTO: 29.5 PG (ref 26.1–32.9)
MCHC RBC AUTO-ENTMCNC: 32.2 G/DL (ref 31.4–35)
MCV RBC AUTO: 91.7 FL (ref 82–102)
NRBC # BLD: 0 K/UL (ref 0–0.2)
PLATELET # BLD AUTO: 226 K/UL (ref 150–450)
PMV BLD AUTO: 10.1 FL (ref 9.4–12.3)
RBC # BLD AUTO: 4.81 M/UL (ref 4.05–5.2)
SERVICE CMNT-IMP: ABNORMAL
SERVICE CMNT-IMP: ABNORMAL
SPECIMEN EXP DATE BLD: NORMAL
WBC # BLD AUTO: 5.5 K/UL (ref 4.3–11.1)

## 2023-08-29 PROCEDURE — 3600000004 HC SURGERY LEVEL 4 BASE: Performed by: OBSTETRICS & GYNECOLOGY

## 2023-08-29 PROCEDURE — 81025 URINE PREGNANCY TEST: CPT

## 2023-08-29 PROCEDURE — 6360000002 HC RX W HCPCS: Performed by: OBSTETRICS & GYNECOLOGY

## 2023-08-29 PROCEDURE — 3600000014 HC SURGERY LEVEL 4 ADDTL 15MIN: Performed by: OBSTETRICS & GYNECOLOGY

## 2023-08-29 PROCEDURE — 6370000000 HC RX 637 (ALT 250 FOR IP): Performed by: OBSTETRICS & GYNECOLOGY

## 2023-08-29 PROCEDURE — 6360000002 HC RX W HCPCS: Performed by: ANESTHESIOLOGY

## 2023-08-29 PROCEDURE — 86900 BLOOD TYPING SEROLOGIC ABO: CPT

## 2023-08-29 PROCEDURE — 2500000003 HC RX 250 WO HCPCS: Performed by: NURSE ANESTHETIST, CERTIFIED REGISTERED

## 2023-08-29 PROCEDURE — 7100000001 HC PACU RECOVERY - ADDTL 15 MIN: Performed by: OBSTETRICS & GYNECOLOGY

## 2023-08-29 PROCEDURE — 82962 GLUCOSE BLOOD TEST: CPT

## 2023-08-29 PROCEDURE — 6370000000 HC RX 637 (ALT 250 FOR IP): Performed by: ANESTHESIOLOGY

## 2023-08-29 PROCEDURE — 6360000002 HC RX W HCPCS: Performed by: NURSE ANESTHETIST, CERTIFIED REGISTERED

## 2023-08-29 PROCEDURE — 2580000003 HC RX 258: Performed by: ANESTHESIOLOGY

## 2023-08-29 PROCEDURE — 2720000010 HC SURG SUPPLY STERILE: Performed by: OBSTETRICS & GYNECOLOGY

## 2023-08-29 PROCEDURE — 86901 BLOOD TYPING SEROLOGIC RH(D): CPT

## 2023-08-29 PROCEDURE — 3700000000 HC ANESTHESIA ATTENDED CARE: Performed by: OBSTETRICS & GYNECOLOGY

## 2023-08-29 PROCEDURE — 2580000003 HC RX 258: Performed by: OBSTETRICS & GYNECOLOGY

## 2023-08-29 PROCEDURE — 85027 COMPLETE CBC AUTOMATED: CPT

## 2023-08-29 PROCEDURE — 86850 RBC ANTIBODY SCREEN: CPT

## 2023-08-29 PROCEDURE — 2580000003 HC RX 258: Performed by: NURSE ANESTHETIST, CERTIFIED REGISTERED

## 2023-08-29 PROCEDURE — 88307 TISSUE EXAM BY PATHOLOGIST: CPT

## 2023-08-29 PROCEDURE — 2709999900 HC NON-CHARGEABLE SUPPLY: Performed by: OBSTETRICS & GYNECOLOGY

## 2023-08-29 PROCEDURE — 7100000000 HC PACU RECOVERY - FIRST 15 MIN: Performed by: OBSTETRICS & GYNECOLOGY

## 2023-08-29 PROCEDURE — 3700000001 HC ADD 15 MINUTES (ANESTHESIA): Performed by: OBSTETRICS & GYNECOLOGY

## 2023-08-29 RX ORDER — KETAMINE HCL IN NACL, ISO-OSM 20 MG/2 ML
SYRINGE (ML) INJECTION PRN
Status: DISCONTINUED | OUTPATIENT
Start: 2023-08-29 | End: 2023-08-29 | Stop reason: SDUPTHER

## 2023-08-29 RX ORDER — SODIUM CHLORIDE, SODIUM LACTATE, POTASSIUM CHLORIDE, CALCIUM CHLORIDE 600; 310; 30; 20 MG/100ML; MG/100ML; MG/100ML; MG/100ML
INJECTION, SOLUTION INTRAVENOUS CONTINUOUS
Status: DISCONTINUED | OUTPATIENT
Start: 2023-08-29 | End: 2023-08-29

## 2023-08-29 RX ORDER — DEXTROSE MONOHYDRATE 50 MG/ML
INJECTION, SOLUTION INTRAVENOUS CONTINUOUS PRN
Status: DISCONTINUED | OUTPATIENT
Start: 2023-08-29 | End: 2023-08-29 | Stop reason: SDUPTHER

## 2023-08-29 RX ORDER — MIDAZOLAM HYDROCHLORIDE 2 MG/2ML
2 INJECTION, SOLUTION INTRAMUSCULAR; INTRAVENOUS
Status: COMPLETED | OUTPATIENT
Start: 2023-08-29 | End: 2023-08-29

## 2023-08-29 RX ORDER — FENTANYL CITRATE 50 UG/ML
INJECTION, SOLUTION INTRAMUSCULAR; INTRAVENOUS PRN
Status: DISCONTINUED | OUTPATIENT
Start: 2023-08-29 | End: 2023-08-29 | Stop reason: SDUPTHER

## 2023-08-29 RX ORDER — SODIUM CHLORIDE 9 MG/ML
INJECTION, SOLUTION INTRAVENOUS PRN
Status: DISCONTINUED | OUTPATIENT
Start: 2023-08-29 | End: 2023-08-30 | Stop reason: HOSPADM

## 2023-08-29 RX ORDER — SODIUM CHLORIDE 0.9 % (FLUSH) 0.9 %
5-40 SYRINGE (ML) INJECTION PRN
Status: DISCONTINUED | OUTPATIENT
Start: 2023-08-29 | End: 2023-08-29 | Stop reason: HOSPADM

## 2023-08-29 RX ORDER — SODIUM CHLORIDE 0.9 % (FLUSH) 0.9 %
5-40 SYRINGE (ML) INJECTION EVERY 12 HOURS SCHEDULED
Status: DISCONTINUED | OUTPATIENT
Start: 2023-08-29 | End: 2023-08-29 | Stop reason: HOSPADM

## 2023-08-29 RX ORDER — SODIUM CHLORIDE 0.9 % (FLUSH) 0.9 %
5-40 SYRINGE (ML) INJECTION PRN
Status: DISCONTINUED | OUTPATIENT
Start: 2023-08-29 | End: 2023-08-30 | Stop reason: HOSPADM

## 2023-08-29 RX ORDER — ONDANSETRON 4 MG/1
4 TABLET, ORALLY DISINTEGRATING ORAL EVERY 8 HOURS PRN
Status: DISCONTINUED | OUTPATIENT
Start: 2023-08-29 | End: 2023-08-30 | Stop reason: HOSPADM

## 2023-08-29 RX ORDER — BUPIVACAINE HYDROCHLORIDE 5 MG/ML
INJECTION, SOLUTION EPIDURAL; INTRACAUDAL PRN
Status: DISCONTINUED | OUTPATIENT
Start: 2023-08-29 | End: 2023-08-29 | Stop reason: HOSPADM

## 2023-08-29 RX ORDER — SODIUM CHLORIDE, SODIUM LACTATE, POTASSIUM CHLORIDE, CALCIUM CHLORIDE 600; 310; 30; 20 MG/100ML; MG/100ML; MG/100ML; MG/100ML
INJECTION, SOLUTION INTRAVENOUS CONTINUOUS
Status: DISCONTINUED | OUTPATIENT
Start: 2023-08-29 | End: 2023-08-29 | Stop reason: HOSPADM

## 2023-08-29 RX ORDER — KETOROLAC TROMETHAMINE 30 MG/ML
30 INJECTION, SOLUTION INTRAMUSCULAR; INTRAVENOUS EVERY 6 HOURS
Status: DISCONTINUED | OUTPATIENT
Start: 2023-08-29 | End: 2023-08-30 | Stop reason: HOSPADM

## 2023-08-29 RX ORDER — ACETAMINOPHEN 500 MG
1000 TABLET ORAL ONCE
Status: COMPLETED | OUTPATIENT
Start: 2023-08-29 | End: 2023-08-29

## 2023-08-29 RX ORDER — SCOLOPAMINE TRANSDERMAL SYSTEM 1 MG/1
1 PATCH, EXTENDED RELEASE TRANSDERMAL
Status: DISCONTINUED | OUTPATIENT
Start: 2023-08-29 | End: 2023-08-30 | Stop reason: HOSPADM

## 2023-08-29 RX ORDER — SODIUM CHLORIDE 9 MG/ML
INJECTION, SOLUTION INTRAVENOUS PRN
Status: DISCONTINUED | OUTPATIENT
Start: 2023-08-29 | End: 2023-08-29 | Stop reason: HOSPADM

## 2023-08-29 RX ORDER — NEOSTIGMINE METHYLSULFATE 1 MG/ML
INJECTION, SOLUTION INTRAVENOUS PRN
Status: DISCONTINUED | OUTPATIENT
Start: 2023-08-29 | End: 2023-08-29 | Stop reason: SDUPTHER

## 2023-08-29 RX ORDER — SODIUM CHLORIDE 0.9 % (FLUSH) 0.9 %
5-40 SYRINGE (ML) INJECTION EVERY 12 HOURS SCHEDULED
Status: DISCONTINUED | OUTPATIENT
Start: 2023-08-29 | End: 2023-08-30 | Stop reason: HOSPADM

## 2023-08-29 RX ORDER — ONDANSETRON 2 MG/ML
4 INJECTION INTRAMUSCULAR; INTRAVENOUS EVERY 6 HOURS PRN
Status: DISCONTINUED | OUTPATIENT
Start: 2023-08-29 | End: 2023-08-30 | Stop reason: HOSPADM

## 2023-08-29 RX ORDER — LIDOCAINE HYDROCHLORIDE 10 MG/ML
1 INJECTION, SOLUTION INFILTRATION; PERINEURAL
Status: DISCONTINUED | OUTPATIENT
Start: 2023-08-29 | End: 2023-08-29 | Stop reason: HOSPADM

## 2023-08-29 RX ORDER — SODIUM CHLORIDE 0.9 % (FLUSH) 0.9 %
5-40 SYRINGE (ML) INJECTION EVERY 12 HOURS SCHEDULED
Status: DISCONTINUED | OUTPATIENT
Start: 2023-08-29 | End: 2023-08-29 | Stop reason: SDUPTHER

## 2023-08-29 RX ORDER — GLYCOPYRROLATE 0.2 MG/ML
INJECTION INTRAMUSCULAR; INTRAVENOUS PRN
Status: DISCONTINUED | OUTPATIENT
Start: 2023-08-29 | End: 2023-08-29 | Stop reason: SDUPTHER

## 2023-08-29 RX ORDER — ONDANSETRON 2 MG/ML
4 INJECTION INTRAMUSCULAR; INTRAVENOUS
Status: DISCONTINUED | OUTPATIENT
Start: 2023-08-29 | End: 2023-08-29 | Stop reason: HOSPADM

## 2023-08-29 RX ORDER — DIPHENHYDRAMINE HYDROCHLORIDE 50 MG/ML
12.5 INJECTION INTRAMUSCULAR; INTRAVENOUS
Status: DISCONTINUED | OUTPATIENT
Start: 2023-08-29 | End: 2023-08-29 | Stop reason: HOSPADM

## 2023-08-29 RX ORDER — PROCHLORPERAZINE EDISYLATE 5 MG/ML
5 INJECTION INTRAMUSCULAR; INTRAVENOUS
Status: DISCONTINUED | OUTPATIENT
Start: 2023-08-29 | End: 2023-08-29 | Stop reason: HOSPADM

## 2023-08-29 RX ORDER — DOCUSATE SODIUM 100 MG/1
100 CAPSULE, LIQUID FILLED ORAL 2 TIMES DAILY
Status: DISCONTINUED | OUTPATIENT
Start: 2023-08-29 | End: 2023-08-30 | Stop reason: HOSPADM

## 2023-08-29 RX ORDER — KETOROLAC TROMETHAMINE 30 MG/ML
INJECTION, SOLUTION INTRAMUSCULAR; INTRAVENOUS PRN
Status: DISCONTINUED | OUTPATIENT
Start: 2023-08-29 | End: 2023-08-29 | Stop reason: SDUPTHER

## 2023-08-29 RX ORDER — SODIUM CHLORIDE 0.9 % (FLUSH) 0.9 %
5-40 SYRINGE (ML) INJECTION PRN
Status: DISCONTINUED | OUTPATIENT
Start: 2023-08-29 | End: 2023-08-29 | Stop reason: SDUPTHER

## 2023-08-29 RX ORDER — HYDROMORPHONE HYDROCHLORIDE 1 MG/ML
INJECTION, SOLUTION INTRAMUSCULAR; INTRAVENOUS; SUBCUTANEOUS PRN
Status: DISCONTINUED | OUTPATIENT
Start: 2023-08-29 | End: 2023-08-29 | Stop reason: SDUPTHER

## 2023-08-29 RX ORDER — EPHEDRINE SULFATE/0.9% NACL/PF 50 MG/5 ML
SYRINGE (ML) INTRAVENOUS PRN
Status: DISCONTINUED | OUTPATIENT
Start: 2023-08-29 | End: 2023-08-29 | Stop reason: SDUPTHER

## 2023-08-29 RX ORDER — LIDOCAINE HYDROCHLORIDE 20 MG/ML
INJECTION, SOLUTION EPIDURAL; INFILTRATION; INTRACAUDAL; PERINEURAL PRN
Status: DISCONTINUED | OUTPATIENT
Start: 2023-08-29 | End: 2023-08-29 | Stop reason: SDUPTHER

## 2023-08-29 RX ORDER — HYDROCODONE BITARTRATE AND ACETAMINOPHEN 5; 325 MG/1; MG/1
2 TABLET ORAL EVERY 4 HOURS PRN
Status: DISCONTINUED | OUTPATIENT
Start: 2023-08-29 | End: 2023-08-30 | Stop reason: HOSPADM

## 2023-08-29 RX ORDER — SODIUM CHLORIDE 9 MG/ML
INJECTION, SOLUTION INTRAVENOUS PRN
Status: DISCONTINUED | OUTPATIENT
Start: 2023-08-29 | End: 2023-08-29 | Stop reason: SDUPTHER

## 2023-08-29 RX ORDER — HYDROCODONE BITARTRATE AND ACETAMINOPHEN 5; 325 MG/1; MG/1
1 TABLET ORAL EVERY 4 HOURS PRN
Status: DISCONTINUED | OUTPATIENT
Start: 2023-08-29 | End: 2023-08-30 | Stop reason: HOSPADM

## 2023-08-29 RX ORDER — ROCURONIUM BROMIDE 10 MG/ML
INJECTION, SOLUTION INTRAVENOUS PRN
Status: DISCONTINUED | OUTPATIENT
Start: 2023-08-29 | End: 2023-08-29 | Stop reason: SDUPTHER

## 2023-08-29 RX ADMIN — GLYCOPYRROLATE 0.2 MG: 0.2 INJECTION INTRAMUSCULAR; INTRAVENOUS at 14:35

## 2023-08-29 RX ADMIN — HYDROMORPHONE HYDROCHLORIDE 0.5 MG: 1 INJECTION, SOLUTION INTRAMUSCULAR; INTRAVENOUS; SUBCUTANEOUS at 16:39

## 2023-08-29 RX ADMIN — HYDROMORPHONE HYDROCHLORIDE 0.4 MG: 1 INJECTION, SOLUTION INTRAMUSCULAR; INTRAVENOUS; SUBCUTANEOUS at 14:28

## 2023-08-29 RX ADMIN — LIDOCAINE HYDROCHLORIDE 60 MG: 20 INJECTION, SOLUTION EPIDURAL; INFILTRATION; INTRACAUDAL; PERINEURAL at 13:52

## 2023-08-29 RX ADMIN — HYDROMORPHONE HYDROCHLORIDE 0.2 MG: 1 INJECTION, SOLUTION INTRAMUSCULAR; INTRAVENOUS; SUBCUTANEOUS at 16:05

## 2023-08-29 RX ADMIN — Medication 3 MG: at 15:48

## 2023-08-29 RX ADMIN — Medication 2 G: at 13:47

## 2023-08-29 RX ADMIN — GLYCOPYRROLATE 0.2 MG: 0.2 INJECTION INTRAMUSCULAR; INTRAVENOUS at 15:58

## 2023-08-29 RX ADMIN — FENTANYL CITRATE 100 MCG: 50 INJECTION, SOLUTION INTRAMUSCULAR; INTRAVENOUS at 13:52

## 2023-08-29 RX ADMIN — DOCUSATE SODIUM 100 MG: 100 CAPSULE, LIQUID FILLED ORAL at 21:14

## 2023-08-29 RX ADMIN — ROCURONIUM BROMIDE 10 MG: 10 INJECTION, SOLUTION INTRAVENOUS at 14:45

## 2023-08-29 RX ADMIN — Medication 10 MG: at 14:33

## 2023-08-29 RX ADMIN — DEXTROSE: 5 SOLUTION INTRAVENOUS at 14:15

## 2023-08-29 RX ADMIN — ACETAMINOPHEN 1000 MG: 500 TABLET, FILM COATED ORAL at 11:06

## 2023-08-29 RX ADMIN — HYDROMORPHONE HYDROCHLORIDE 0.4 MG: 1 INJECTION, SOLUTION INTRAMUSCULAR; INTRAVENOUS; SUBCUTANEOUS at 14:43

## 2023-08-29 RX ADMIN — SODIUM CHLORIDE, SODIUM LACTATE, POTASSIUM CHLORIDE, AND CALCIUM CHLORIDE: 600; 310; 30; 20 INJECTION, SOLUTION INTRAVENOUS at 14:48

## 2023-08-29 RX ADMIN — SODIUM CHLORIDE, SODIUM LACTATE, POTASSIUM CHLORIDE, AND CALCIUM CHLORIDE 125 ML/HR: 600; 310; 30; 20 INJECTION, SOLUTION INTRAVENOUS at 11:06

## 2023-08-29 RX ADMIN — KETOROLAC TROMETHAMINE 30 MG: 30 INJECTION, SOLUTION INTRAMUSCULAR; INTRAVENOUS at 21:15

## 2023-08-29 RX ADMIN — HYDROCODONE BITARTRATE AND ACETAMINOPHEN 2 TABLET: 5; 325 TABLET ORAL at 19:01

## 2023-08-29 RX ADMIN — HYDROMORPHONE HYDROCHLORIDE 0.5 MG: 1 INJECTION, SOLUTION INTRAMUSCULAR; INTRAVENOUS; SUBCUTANEOUS at 16:34

## 2023-08-29 RX ADMIN — GLYCOPYRROLATE 0.4 MG: 0.2 INJECTION INTRAMUSCULAR; INTRAVENOUS at 15:48

## 2023-08-29 RX ADMIN — Medication 40 MG: at 13:52

## 2023-08-29 RX ADMIN — KETOROLAC TROMETHAMINE 30 MG: 30 INJECTION, SOLUTION INTRAMUSCULAR; INTRAVENOUS at 15:49

## 2023-08-29 RX ADMIN — ONDANSETRON 4 MG: 2 INJECTION INTRAMUSCULAR; INTRAVENOUS at 18:09

## 2023-08-29 RX ADMIN — SODIUM CHLORIDE, PRESERVATIVE FREE 10 ML: 5 INJECTION INTRAVENOUS at 21:15

## 2023-08-29 RX ADMIN — MIDAZOLAM 2 MG: 1 INJECTION INTRAMUSCULAR; INTRAVENOUS at 13:07

## 2023-08-29 RX ADMIN — GLYCOPYRROLATE 0.2 MG: 0.2 INJECTION INTRAMUSCULAR; INTRAVENOUS at 14:00

## 2023-08-29 RX ADMIN — ROCURONIUM BROMIDE 40 MG: 10 INJECTION, SOLUTION INTRAVENOUS at 13:52

## 2023-08-29 ASSESSMENT — PAIN DESCRIPTION - LOCATION
LOCATION: ABDOMEN

## 2023-08-29 ASSESSMENT — PAIN - FUNCTIONAL ASSESSMENT: PAIN_FUNCTIONAL_ASSESSMENT: 0-10

## 2023-08-29 ASSESSMENT — PAIN DESCRIPTION - DESCRIPTORS
DESCRIPTORS: CRAMPING
DESCRIPTORS: CRAMPING;ACHING;STABBING

## 2023-08-29 ASSESSMENT — PAIN DESCRIPTION - ORIENTATION: ORIENTATION: MID

## 2023-08-29 ASSESSMENT — PAIN SCALES - GENERAL
PAINLEVEL_OUTOF10: 8
PAINLEVEL_OUTOF10: 8
PAINLEVEL_OUTOF10: 5
PAINLEVEL_OUTOF10: 4
PAINLEVEL_OUTOF10: 6

## 2023-08-29 NOTE — H&P
No changes from prior H&P on 8/26/23
MISCELLANEOUS ROUTE EVERY 3 (THREE) MONTHS USE TO CHECK BLOOD GLUCOSE AS DIRECTED.  06/16/23   filled surescripts  EC-Naproxen 500 mg tablet,delayed release  11/29/22   filled surescripts  fluticasone propionate 50 mcg/actuation nasal spray,suspension  ADMINISTER 2 SPRAYS INTO EACH NOSTRIL IN THE MORNING.  08/11/22   filled surescripts  HYDROcodone 5 mg-acetaminophen 325 mg tablet  TAKE 1 TABLET BY MOUTH EVERY 6 HOURS AS NEEDED FOR MODERATE PAIN (SCORE 5-7) FOR UP TO 3 DAYS  05/28/23   filled surescripts  ibuprofen 600 mg tablet  TAKE 1 TABLET (600 MG) BY MOUTH EVERY 8 (EIGHT) HOURS AS NEEDED FOR MILD PAIN  09/28/22   filled surescripts  ketorolac 10 mg tablet  TAKE 1 TABLET BY MOUTH EVERY 6 HOURS AS NEEDED FOR 5 DAYS  08/16/23   filled surescripts  lidocaine 5 % topical patch  PLACE 1 PATCH ONTO THE SKIN DAILY FOR 10 DAYS. 12 HOURS ON 12 HOURS OFF  05/30/23   filled surescripts  meclizine 25 mg tablet  TAKE 1 TABLET (25 MG TOTAL) BY MOUTH 3 (THREE) TIMES A DAY AS NEEDED FOR DIZZINESS FOR UP TO 10 DAYS  08/11/22   filled surescripts  meloxicam 15 mg tablet  TAKE 1 TABLET BY MOUTH EVERY DAY  08/03/22   filled surescripts  naloxone 4 mg/actuation nasal spray  ADMINISTER A SINGLE SPRAY IN ONE NOSTRIL UPON SIGNS OF OPIOID OVERDOSE. CALL 911.  REPEAT AFTER 3 MINUTES IF NO RESPONSE.  05/28/23   filled surescripts  naproxen 500 mg tablet  TAKE 1 TABLET BY MOUTH TWICE A DAY WITH MEALS FOR 20 DAYS  05/30/23   filled surescripts  NovoLOG U-100 Insulin aspart 100 unit/mL subcutaneous solution  VIA PUMP MAX DOSE 100 UNITS/DAY  08/06/23   filled surescripts  Omnipod 5 G6 Intro Kit (Gen 5) subcutaneous cartridge with controller  INJECT 1 UNIT UNDER THE SKIN CONTINUOUS  06/17/22   filled surescripts  Omnipod 5 G6 Pods (Gen 5) subcutaneous cartridge  INJECT 1 UNIT UNDER THE SKIN EVERY OTHER DAY  07/15/23   filled surescripts  ondansetron 4 mg disintegrating tablet  TAKE 1 TABLET BY MOUTH EVERY 8 HOURS AS NEEDED FOR NAUSEA OR

## 2023-08-29 NOTE — BRIEF OP NOTE
Brief Postoperative Note      Patient: Enoch Mosquera  YOB: 1980  MRN: [id number]    Date of Procedure: 8/29/2023    Pre-Op Diagnosis Codes: * Dysmenorrhea [N94.6]    Post-Op Diagnosis: Same and endometriosis       Procedure(s): HYSTERECTOMY ABDOMINAL LAPAROSCOPIC WITH BILATERAL SAPLINGECTOMY, laser ablation of endometriosis     Surgeon(s):  MD Deedee Darby MD    Assistant:  * No surgical staff found *    Anesthesia: General    Estimated Blood Loss (mL): less than 50     Complications: None    Specimens:   ID Type Source Tests Collected by Time Destination   A : Uterus and bilateral tubes Tissue Uterus SURGICAL PATHOLOGY Roz Bhatia MD 8/29/2023 1510        Implants:  * No implants in log *      Drains:   Urinary Catheter 08/29/23 2 Way; [place] (Active)       Findings: see dictation # [id number]      Electronically signed by Roz Bhatia MD on 8/29/2023 at 4:09 PM

## 2023-08-29 NOTE — PERIOP NOTE
Dr Tyler Mohan notified pt unwilling to take 5 u novolog. Dr Tyler Mohan to bedside and explains bs at 300 impacts healing and increases risk for infection. Pt gives herself bolus from her pump 2.7u novolog.

## 2023-08-29 NOTE — ANESTHESIA POSTPROCEDURE EVALUATION
Department of Anesthesiology  Postprocedure Note    Patient: Scott Gomez  MRN: [id number]  YOB: 1980  Date of evaluation: 8/29/2023      Procedure Summary     Date: 08/29/23 Room / Location: [facility] [place] [facility] MAIN OR 02 / [facility] [place] [facility] MAIN OR    Anesthesia Start: 2253 Anesthesia Stop: 2053    Procedure: HYSTERECTOMY ABDOMINAL LAPAROSCOPIC WITH BILATERAL SAPLINGECTOMY (Bilateral: Abdomen) Diagnosis:       Dysmenorrhea      (Dysmenorrhea [N94.6])    Surgeons: Ruben Casanova MD Responsible Provider: Tracy Jackson MD    Anesthesia Type: general ASA Status: 2          Anesthesia Type: No value filed.     Yudy Phase I: Yudy Score: 10    Yudy Phase II:        Anesthesia Post Evaluation    Patient location during evaluation: PACU  Patient participation: complete - patient participated  Level of consciousness: awake  Pain score: 0  Airway patency: patent  Nausea & Vomiting: no nausea and no vomiting  Complications: no  Cardiovascular status: blood pressure returned to baseline and hemodynamically stable  Respiratory status: acceptable, spontaneous ventilation and nonlabored ventilation  Hydration status: euvolemic  Multimodal analgesia pain management approach  Pain management: adequate

## 2023-08-29 NOTE — PERIOP NOTE
TRANSFER - OUT REPORT:    Verbal report given to ROHINI PASTOR on Gage Wild  being transferred to room 315 for routine progression of patient care       Report consisted of patient's Situation, Background, Assessment and   Recommendations(SBAR). Information from the following report(s) Nurse Handoff Report, Adult Overview, and MAR was reviewed with the receiving nurse. Lines:   Peripheral IV 08/29/23 Posterior;Right Hand (Active)   Site Assessment Clean, dry & intact 08/29/23 1615   Line Status Infusing [facility]23 [facility] checked and tightened 08/29/23 1615   Phlebitis Assessment No symptoms 08/29/23 1615   Infiltration Assessment 0 08/29/23 1615   Dressing Status Clean, dry & intact 08/29/23 1615   Dressing Type Transparent 08/29/23 1615        Opportunity for questions and clarification was provided.       Patient transported with:  O2 @ 0lpm

## 2023-08-29 NOTE — PROGRESS NOTES
TRANSFER - IN REPORT:    Verbal report received from ROHINI Abarca on Emily Iron  being received from PACU for routine post-op      Report consisted of patient's Situation, Background, Assessment and   Recommendations(SBAR). Information from the following report(s) Adult Overview, Surgery Report, Intake/Output, MAR, and Recent Results was reviewed with the receiving nurse. Opportunity for questions and clarification was provided. Assessment completed upon patient's arrival to unit and care assumed.

## 2023-08-30 VITALS
BODY MASS INDEX: 25.52 KG/M2 | OXYGEN SATURATION: 100 % | HEIGHT: 64 IN | SYSTOLIC BLOOD PRESSURE: 115 MMHG | DIASTOLIC BLOOD PRESSURE: 67 MMHG | RESPIRATION RATE: 23 BRPM | HEART RATE: 78 BPM | TEMPERATURE: 98.4 F | WEIGHT: 149.5 LBS

## 2023-08-30 LAB
HCT VFR BLD AUTO: 40.9 % (ref 35.8–46.3)
HGB BLD-MCNC: 13.4 G/DL (ref 11.7–15.4)

## 2023-08-30 PROCEDURE — 6370000000 HC RX 637 (ALT 250 FOR IP): Performed by: OBSTETRICS & GYNECOLOGY

## 2023-08-30 PROCEDURE — 85018 HEMOGLOBIN: CPT

## 2023-08-30 PROCEDURE — 85014 HEMATOCRIT: CPT

## 2023-08-30 PROCEDURE — 36415 COLL VENOUS BLD VENIPUNCTURE: CPT

## 2023-08-30 PROCEDURE — 6360000002 HC RX W HCPCS: Performed by: OBSTETRICS & GYNECOLOGY

## 2023-08-30 RX ORDER — KETOROLAC TROMETHAMINE 10 MG/1
10 TABLET, FILM COATED ORAL EVERY 6 HOURS
Qty: 15 TABLET | Refills: 0 | Status: SHIPPED | OUTPATIENT
Start: 2023-08-30 | End: 2023-09-03

## 2023-08-30 RX ORDER — ONDANSETRON 2 MG/ML
4 INJECTION INTRAMUSCULAR; INTRAVENOUS EVERY 6 HOURS PRN
Qty: 10 ML | Refills: 0 | Status: SHIPPED | OUTPATIENT
Start: 2023-08-30

## 2023-08-30 RX ORDER — PSEUDOEPHEDRINE HCL 30 MG
100 TABLET ORAL 2 TIMES DAILY
Qty: 60 CAPSULE | Refills: 0 | Status: SHIPPED | OUTPATIENT
Start: 2023-08-30

## 2023-08-30 RX ORDER — HYDROCODONE BITARTRATE AND ACETAMINOPHEN 5; 325 MG/1; MG/1
1 TABLET ORAL EVERY 4 HOURS PRN
Qty: 30 TABLET | Refills: 0 | Status: SHIPPED | OUTPATIENT
Start: 2023-08-30 | End: 2023-09-13

## 2023-08-30 RX ADMIN — DOCUSATE SODIUM 100 MG: 100 CAPSULE, LIQUID FILLED ORAL at 09:00

## 2023-08-30 RX ADMIN — KETOROLAC TROMETHAMINE 30 MG: 30 INJECTION, SOLUTION INTRAMUSCULAR; INTRAVENOUS at 05:33

## 2023-08-30 RX ADMIN — HYDROCODONE BITARTRATE AND ACETAMINOPHEN 2 TABLET: 5; 325 TABLET ORAL at 02:18

## 2023-08-30 RX ADMIN — HYDROCODONE BITARTRATE AND ACETAMINOPHEN 2 TABLET: 5; 325 TABLET ORAL at 07:24

## 2023-08-30 ASSESSMENT — PAIN DESCRIPTION - LOCATION
LOCATION: ABDOMEN
LOCATION: ABDOMEN

## 2023-08-30 ASSESSMENT — PAIN SCALES - GENERAL
PAINLEVEL_OUTOF10: 7
PAINLEVEL_OUTOF10: 4
PAINLEVEL_OUTOF10: 8

## 2023-08-30 ASSESSMENT — PAIN DESCRIPTION - DESCRIPTORS: DESCRIPTORS: ACHING;SHARP

## 2023-08-30 ASSESSMENT — PAIN DESCRIPTION - ORIENTATION
ORIENTATION: MID
ORIENTATION: MID

## 2023-08-30 NOTE — DISCHARGE INSTRUCTIONS
Okay to shower. No tub baths. No sexual intercourse, tampons, or douching x 6 weeks. No driving while taking pain meds. No heavy lifting x 6 weeks. Resume pre hospital diet. Keep scheduled follow up appointment. Laparoscopic Hysterectomy: What to Expect at [address] laparoscopic hysterectomy is surgery to take out the uterus. Your doctor put a lighted tube and surgical tools through small cuts in your belly to removethe uterus. You can expect to feel better and stronger each day. But you might need pain medicine for a week or two. You may get tired easily or have less energy than usual. The tiredness may last for several weeks after surgery. And you also mayhave light vaginal bleeding for a few weeks. It's important to avoid lifting while you are recovering so that you can heal. It may take about 4 to 6 weeks to fully recover. The recovery time may beshorter for some people. This care sheet gives you a general idea about how long it will take for you to recover. But each person recovers at a different pace. Follow the steps belowto get better as quickly as possible. How can you care for yourself at home? Activity    Rest when you feel tired. Getting enough sleep will help you recover. Try to walk each day. Start by walking a little more than you did the day before. Bit by bit, increase the amount you walk. Walking boosts blood flow and helps prevent pneumonia and constipation. Avoid lifting anything that would make you strain. This may include heavy grocery bags and milk containers, a heavy briefcase or backpack, bags of cat litter or dog food, a vacuum , or a child. Avoid strenuous activities, such as biking, jogging, weight lifting, or aerobic exercise, until your doctor says it is okay. Ask your doctor when you can drive again. You may shower 24 to 48 hours after surgery, if your doctor okays it. Pat the incision dry.  Do not take a bath for the first 2

## 2023-08-30 NOTE — DISCHARGE SUMMARY
Patient ID:  Enoch Mosquera  [id number]  76 y.o.  1980    Admit Date: 8/29/2023    Discharge Date: 8/30/2023     Admitting Physician: Roz Bhatia MD     Admission Diagnoses: Dysmenorrhea [N94.6]  Post-op pain [G89.18]    Discharge Diagnoses: same s/p TLH, BS, Laser ablation of endo. Additional Diagnoses: none. No components found for: OBEXTABO/RH, OBEXTABSCRN, OBEXTHBSAG, OBEXTHIV, OBEXTRUBELLA, OBEXTRPR, OBEXTGONORR, OBEXTCHLAM, OBEXTGRBS    Significant Diagnostic Studies: non            History of Present Illness:   OB History    No obstetric history on file. Admitted for surgery. Had thesurgery above, with overnight obs. Doing well. Hospital Course: stable voiding. Precautions given. Patient Instructions:   Current Discharge Medication List        START taking these medications    Details   HYDROcodone-acetaminophen (NORCO) 5-325 MG per tablet Take 1 tablet by mouth every 4 hours as needed for Pain for up to 14 days.  Max Daily Amount: 6 tablets  Qty: 30 tablet, Refills: 0    Comments: Reduce doses taken as pain becomes manageable  Associated Diagnoses: Post-op pain; Endometriosis determined by laparoscopy      ondansetron (ZOFRAN) 4 MG/2ML injection Infuse 2 mLs intravenously every 6 hours as needed for Nausea or Vomiting  Qty: 10 mL, Refills: 0      docusate sodium (COLACE, DULCOLAX) 100 MG CAPS Take 100 mg by mouth 2 times daily  Qty: 60 capsule, Refills: 0           CONTINUE these medications which have NOT CHANGED    Details   Insulin Disposable Pump (OMNIPOD 5 G6 POD, GEN 5,) MISC Inject into the skin every other day      NOVOLOG 100 UNIT/ML injection vial       Continuous Blood Gluc Sensor (DEXCOM G4 SENSOR) MISC 1 Device by Other route continuous      Insulin Degludec 200 UNIT/ML SOPN Inject into the skin           STOP taking these medications       naproxen (NAPROSYN) 500 MG tablet Comments:   Reason for Stopping:             Call if vaginal bleeding greater than 1 pad

## 2023-08-30 NOTE — OP NOTE
broad ligament and dissecting it, provide the bladder flap, coagulating the uterine vessels. Similar procedure was performed by myself on the right side with both vessels coagulated. We then transected them and then took the bladder flap down further, dissected it down to the edge of the VCare and Dr. Jackquelyn Phalen on the patient's right side, took it down to the edge of the [place]. I myself did on the patient's left side as working across the patient was easier with the better angles to safely go down the cardinal ligament to the edge of the VCare. We then entered into the vagina, circumscribed around the vagina. The uterus was then pulled in the vagina. I initially used to keep the pneumoperitoneum, but it seemed to continually fall out. Asepto bulb was placed in the vagina to provide the pneumoperitoneum. The vaginal cuff was then closed with figure-of-eight first in each angle incorporating the uterosacral ligaments and then three across the midline with good closure of the cuff. The pneumoperitoneum was maintained as the bulb was taken out of the vagina. Copious amounts of irrigation. The argon beam laser was then attached and laser ablating of the areas of endometriosis. Multiple spots were lasered. When all these areas were lasered and both ovaries were freed, the CO2 was taken down a couple of times and continued to have a good pneumoperitoneum. No sign of any further disease. The fascial closure device was then used to close the 11 trocar fascia. All trocar sites were then taken out under direct visualization. After the irrigation, the pelvis noted no active bleeders and then the trocar sites were all closed with subcuticulars of 4-0 Monocryl. All instrument, sponge and needle counts were correct x2. The patient went to recovery room in good condition. Specimens to Pathology.       Lisa Collins MD [id number]  D:  08/29/2023 16:16  T:  08/30/2023 3:08  JOB #:  [id number]  CC:

## 2023-09-22 LAB
AVERAGE GLUCOSE: NORMAL
HBA1C MFR BLD: 7 %

## 2023-11-30 ENCOUNTER — COMMUNITY OUTREACH (OUTPATIENT)
Dept: INTERNAL MEDICINE CLINIC | Facility: CLINIC | Age: 43
End: 2023-11-30

## 2024-06-27 ENCOUNTER — TELEPHONE (OUTPATIENT)
Dept: INTERNAL MEDICINE CLINIC | Facility: CLINIC | Age: 44
End: 2024-06-27

## 2024-06-27 NOTE — TELEPHONE ENCOUNTER
Patient requesting appt for numbness & tingling from neck down to finger tips. Per pt she went to UC they gave her meds, Prednisone & Robaxin but not working and she only has one more day of meds to take. She is tense in her neck & shoulder, they took xray as well but she is still swollen and not feeling well.     Please call and advise.

## 2024-07-01 NOTE — TELEPHONE ENCOUNTER
Called patient, Patient is feeling some what better but stills feels very tense in her shoulder and neck areas. Stated it feels like \" she falls as sleep on her arm and wakes up and its numb\" Stated she doesn't know if she needs to see a chiropractor but the medications that the UC gave here did not help.

## 2024-07-02 ENCOUNTER — APPOINTMENT (OUTPATIENT)
Dept: MRI IMAGING | Age: 44
End: 2024-07-02
Payer: COMMERCIAL

## 2024-07-02 ENCOUNTER — HOSPITAL ENCOUNTER (EMERGENCY)
Age: 44
Discharge: HOME | End: 2024-07-02
Attending: EMERGENCY MEDICINE
Payer: COMMERCIAL

## 2024-07-02 VITALS
WEIGHT: 150 LBS | HEART RATE: 76 BPM | DIASTOLIC BLOOD PRESSURE: 74 MMHG | HEIGHT: 64 IN | BODY MASS INDEX: 25.61 KG/M2 | RESPIRATION RATE: 18 BRPM | OXYGEN SATURATION: 99 % | TEMPERATURE: 98.1 F | SYSTOLIC BLOOD PRESSURE: 132 MMHG

## 2024-07-02 DIAGNOSIS — R20.8 DYSESTHESIA: ICD-10-CM

## 2024-07-02 DIAGNOSIS — M50.30 BULGING OF CERVICAL INTERVERTEBRAL DISC: Primary | ICD-10-CM

## 2024-07-02 LAB
ALBUMIN SERPL-MCNC: 4.2 G/DL (ref 3.5–5)
ALBUMIN/GLOB SERPL: 1.8 (ref 1–1.9)
ALP SERPL-CCNC: 71 U/L (ref 35–104)
ALT SERPL-CCNC: 15 U/L (ref 12–65)
ANION GAP SERPL CALC-SCNC: 10 MMOL/L (ref 9–18)
AST SERPL-CCNC: 17 U/L (ref 15–37)
BASOPHILS # BLD: 0.1 K/UL (ref 0–0.2)
BASOPHILS NFR BLD: 1 % (ref 0–2)
BILIRUB SERPL-MCNC: 0.5 MG/DL (ref 0–1.2)
BUN SERPL-MCNC: 12 MG/DL (ref 6–23)
CALCIUM SERPL-MCNC: 8.9 MG/DL (ref 8.8–10.2)
CHLORIDE SERPL-SCNC: 102 MMOL/L (ref 98–107)
CO2 SERPL-SCNC: 24 MMOL/L (ref 20–28)
CREAT SERPL-MCNC: 0.79 MG/DL (ref 0.6–1.1)
DIFFERENTIAL METHOD BLD: ABNORMAL
EOSINOPHIL # BLD: 0.3 K/UL (ref 0–0.8)
EOSINOPHIL NFR BLD: 5 % (ref 0.5–7.8)
ERYTHROCYTE [DISTWIDTH] IN BLOOD BY AUTOMATED COUNT: 11.6 % (ref 11.9–14.6)
GLOBULIN SER CALC-MCNC: 2.4 G/DL (ref 2.3–3.5)
GLUCOSE SERPL-MCNC: 191 MG/DL (ref 70–99)
HCT VFR BLD AUTO: 41.5 % (ref 35.8–46.3)
HGB BLD-MCNC: 13.5 G/DL (ref 11.7–15.4)
IMM GRANULOCYTES # BLD AUTO: 0 K/UL (ref 0–0.5)
IMM GRANULOCYTES NFR BLD AUTO: 0 % (ref 0–5)
LYMPHOCYTES # BLD: 1.7 K/UL (ref 0.5–4.6)
LYMPHOCYTES NFR BLD: 27 % (ref 13–44)
MAGNESIUM SERPL-MCNC: 2.3 MG/DL (ref 1.8–2.4)
MCH RBC QN AUTO: 31 PG (ref 26.1–32.9)
MCHC RBC AUTO-ENTMCNC: 32.5 G/DL (ref 31.4–35)
MCV RBC AUTO: 95.2 FL (ref 82–102)
MONOCYTES # BLD: 0.8 K/UL (ref 0.1–1.3)
MONOCYTES NFR BLD: 12 % (ref 4–12)
NEUTS SEG # BLD: 3.4 K/UL (ref 1.7–8.2)
NEUTS SEG NFR BLD: 55 % (ref 43–78)
NRBC # BLD: 0 K/UL (ref 0–0.2)
PLATELET # BLD AUTO: 227 K/UL (ref 150–450)
PMV BLD AUTO: 9.6 FL (ref 9.4–12.3)
POTASSIUM SERPL-SCNC: 4.7 MMOL/L (ref 3.5–5.1)
PROT SERPL-MCNC: 6.6 G/DL (ref 6.3–8.2)
RBC # BLD AUTO: 4.36 M/UL (ref 4.05–5.2)
SODIUM SERPL-SCNC: 136 MMOL/L (ref 136–145)
TSH W FREE THYROID IF ABNORMAL: 2.87 UIU/ML (ref 0.27–4.2)
WBC # BLD AUTO: 6.3 K/UL (ref 4.3–11.1)

## 2024-07-02 PROCEDURE — 70553 MRI BRAIN STEM W/O & W/DYE: CPT

## 2024-07-02 PROCEDURE — 96375 TX/PRO/DX INJ NEW DRUG ADDON: CPT

## 2024-07-02 PROCEDURE — 85025 COMPLETE CBC W/AUTO DIFF WBC: CPT

## 2024-07-02 PROCEDURE — 96374 THER/PROPH/DIAG INJ IV PUSH: CPT

## 2024-07-02 PROCEDURE — A9579 GAD-BASE MR CONTRAST NOS,1ML: HCPCS | Performed by: EMERGENCY MEDICINE

## 2024-07-02 PROCEDURE — 83735 ASSAY OF MAGNESIUM: CPT

## 2024-07-02 PROCEDURE — 80053 COMPREHEN METABOLIC PANEL: CPT

## 2024-07-02 PROCEDURE — 6360000002 HC RX W HCPCS: Performed by: EMERGENCY MEDICINE

## 2024-07-02 PROCEDURE — 84443 ASSAY THYROID STIM HORMONE: CPT

## 2024-07-02 PROCEDURE — 72156 MRI NECK SPINE W/O & W/DYE: CPT

## 2024-07-02 PROCEDURE — 6360000004 HC RX CONTRAST MEDICATION: Performed by: EMERGENCY MEDICINE

## 2024-07-02 PROCEDURE — 99285 EMERGENCY DEPT VISIT HI MDM: CPT

## 2024-07-02 RX ORDER — DEXAMETHASONE SODIUM PHOSPHATE 10 MG/ML
10 INJECTION INTRAMUSCULAR; INTRAVENOUS ONCE
Status: COMPLETED | OUTPATIENT
Start: 2024-07-02 | End: 2024-07-02

## 2024-07-02 RX ORDER — HYDROCODONE BITARTRATE AND ACETAMINOPHEN 5; 325 MG/1; MG/1
1 TABLET ORAL EVERY 4 HOURS PRN
Qty: 18 TABLET | Refills: 0 | Status: SHIPPED | OUTPATIENT
Start: 2024-07-02 | End: 2024-07-05

## 2024-07-02 RX ORDER — PREDNISONE 20 MG/1
TABLET ORAL
Qty: 24 TABLET | Refills: 0 | Status: SHIPPED | OUTPATIENT
Start: 2024-07-02 | End: 2024-07-13

## 2024-07-02 RX ORDER — KETOROLAC TROMETHAMINE 15 MG/ML
15 INJECTION, SOLUTION INTRAMUSCULAR; INTRAVENOUS ONCE
Status: COMPLETED | OUTPATIENT
Start: 2024-07-02 | End: 2024-07-02

## 2024-07-02 RX ORDER — IBUPROFEN 600 MG/1
600 TABLET ORAL 3 TIMES DAILY PRN
Qty: 30 TABLET | Refills: 0 | Status: SHIPPED | OUTPATIENT
Start: 2024-07-02 | End: 2024-07-12

## 2024-07-02 RX ADMIN — KETOROLAC TROMETHAMINE 15 MG: 15 INJECTION, SOLUTION INTRAMUSCULAR; INTRAVENOUS at 08:07

## 2024-07-02 RX ADMIN — GADOTERIDOL 14 ML: 279.3 INJECTION, SOLUTION INTRAVENOUS at 09:57

## 2024-07-02 RX ADMIN — DEXAMETHASONE SODIUM PHOSPHATE 10 MG: 10 INJECTION INTRAMUSCULAR; INTRAVENOUS at 12:51

## 2024-07-02 ASSESSMENT — PAIN SCALES - GENERAL
PAINLEVEL_OUTOF10: 7
PAINLEVEL_OUTOF10: 7

## 2024-07-02 ASSESSMENT — LIFESTYLE VARIABLES
HOW MANY STANDARD DRINKS CONTAINING ALCOHOL DO YOU HAVE ON A TYPICAL DAY: 1 OR 2
HOW OFTEN DO YOU HAVE A DRINK CONTAINING ALCOHOL: 2-4 TIMES A MONTH

## 2024-07-02 ASSESSMENT — PAIN - FUNCTIONAL ASSESSMENT: PAIN_FUNCTIONAL_ASSESSMENT: 0-10

## 2024-07-02 NOTE — DISCHARGE INSTRUCTIONS
You have a bulging disc at C5-C6 level in your neck causing cord compression and severe right foraminal narrowing.    Follow up with Spine Surgery, Dr. Torres. A referral has been placed to assist you with follow up.    Take medication as prescribed.

## 2024-07-02 NOTE — ED PROVIDER NOTES
- 1.3 K/UL    Eosinophils Absolute 0.3 0.0 - 0.8 K/UL    Basophils Absolute 0.1 0.0 - 0.2 K/UL    Immature Granulocytes Absolute 0.0 0.0 - 0.5 K/UL   CMP   Result Value Ref Range    Sodium 136 136 - 145 mmol/L    Potassium 4.7 3.5 - 5.1 mmol/L    Chloride 102 98 - 107 mmol/L    CO2 24 20 - 28 mmol/L    Anion Gap 10 9 - 18 mmol/L    Glucose 191 (H) 70 - 99 mg/dL    BUN 12 6 - 23 MG/DL    Creatinine 0.79 0.60 - 1.10 MG/DL    Est, Glom Filt Rate >90 >60 ml/min/1.73m2    Calcium 8.9 8.8 - 10.2 MG/DL    Total Bilirubin 0.5 0.0 - 1.2 MG/DL    ALT 15 12 - 65 U/L    AST 17 15 - 37 U/L    Alk Phosphatase 71 35 - 104 U/L    Total Protein 6.6 6.3 - 8.2 g/dL    Albumin 4.2 3.5 - 5.0 g/dL    Globulin 2.4 2.3 - 3.5 g/dL    Albumin/Globulin Ratio 1.8 1.0 - 1.9     Magnesium   Result Value Ref Range    Magnesium 2.3 1.8 - 2.4 mg/dL   TSH with Reflex   Result Value Ref Range    TSH w Free Thyroid if Abnormal 2.87 0.27 - 4.20 UIU/ML        MRI CERVICAL SPINE W WO CONTRAST   Final Result   Large extruded disc herniation at the C5-C6 level as described, causing cord   compression and severe right foraminal narrowing.         Electronically signed by Isidro Moe      MRI BRAIN W WO CONTRAST   Final Result   Essentially negative plain and contrasted MRI examination of the   brain.         Electronically signed by Isidro Moe           NIH Stroke Scale  Interval: Reassessment  Level of Consciousness (1a): Alert  LOC Questions (1b): Answers both correctly  LOC Commands (1c): Performs both tasks correctly  Best Gaze (2): Normal  Visual (3): No visual loss  Facial Palsy (4): Normal symmetrical movement  Motor Arm, Left (5a): No drift  Motor Arm, Right (5b): No drift  Motor Leg, Left (6a): No drift  Motor Leg, Right (6b): No drift  Limb Ataxia (7): Absent  Sensory (8): (!) Mild to Moderate  Best Language (9): No aphasia  Dysarthria (10): Normal  Extinction and Inattention (11): No abnormality  Total: 1              Voice dictation

## 2024-07-02 NOTE — ED TRIAGE NOTES
Pt ambulatory with c/o pain/numbness/tingling starting in the right side of her neck and radiating down her right arm X2 weeks. Pt states she lifts heavy things at work and thinks she pulled something.     ER MD made aware of pt condition.

## 2024-07-03 ENCOUNTER — TELEPHONE (OUTPATIENT)
Age: 44
End: 2024-07-03

## 2024-07-10 ENCOUNTER — OFFICE VISIT (OUTPATIENT)
Age: 44
End: 2024-07-10
Payer: COMMERCIAL

## 2024-07-10 VITALS — WEIGHT: 144.9 LBS | HEIGHT: 65 IN | BODY MASS INDEX: 24.14 KG/M2

## 2024-07-10 DIAGNOSIS — M54.12 CERVICAL RADICULOPATHY: Primary | ICD-10-CM

## 2024-07-10 PROCEDURE — 99203 OFFICE O/P NEW LOW 30 MIN: CPT | Performed by: ORTHOPAEDIC SURGERY

## 2024-07-10 RX ORDER — GABAPENTIN 100 MG/1
100 CAPSULE ORAL 3 TIMES DAILY
Qty: 90 CAPSULE | Refills: 0 | Status: SHIPPED | OUTPATIENT
Start: 2024-07-10 | End: 2024-08-09

## 2024-07-10 NOTE — PROGRESS NOTES
Name: Mansi Love  YOB: 1980  Gender: female  MRN: [id number]  Age: 43 y.o.    Chief Complaint: Right arm numbness  History of present illness:    This is a very pleasant 43 y.o. female who presents with 4-week history of neck pain and right arm pain and numbness into her right thumb and index finger.  She states that she has shocks to go down to her arm.  She also states that she feels like she has weakness in her right arm.  She is right arm dominant.  Pain is 6/10 in severity and constant.  She states that she went to the emergency department where an MRI was done.  She was started on steroids.  States that her pain and tingling have improved some but she still has symptoms.  She has had no treatment for this.  She denies any balance issues.      Medications:     Prior to Visit Medications    Medication Sig Taking? Authorizing Provider   gabapentin (NEURONTIN) 100 MG capsule Take 1 capsule by mouth 3 times daily for 30 days. Take 1 tablet by mouth at night for 3 nights, then increase to BID morning and night for 3 days, you may taper up to three times a day. Yes Cliff Torres MD   predniSONE (DELTASONE) 20 MG tablet Take 3 tablets by mouth daily for 5 days, THEN 2 tablets daily for 3 days, THEN 1 tablet daily for 3 days.  Bennie Soto MD   ibuprofen (ADVIL;MOTRIN) 600 MG tablet Take 1 tablet by mouth 3 times daily as needed for Pain  Bennie Soto MD   ondansetron (ZOFRAN) 4 MG/2ML injection Infuse 2 mLs intravenously every 6 hours as needed for Nausea or Vomiting  Isaiah Hearn MD   docusate sodium (COLACE, DULCOLAX) 100 MG CAPS Take 100 mg by mouth 2 times daily  Isaiah Hearn MD   ketorolac (TORADOL) 10 MG tablet Take 1 tablet by mouth in the morning and 1 tablet at noon and 1 tablet in the evening and 1 tablet before bedtime. Do all this for 15 doses.  Isaiah Hearn MD   Insulin Disposable Pump (OMNIPOD 5 G6 POD, GEN 5,) MISC Inject into the skin every other day

## 2024-07-24 ENCOUNTER — TELEPHONE (OUTPATIENT)
Dept: ORTHOPEDIC SURGERY | Age: 44
End: 2024-07-24

## 2024-07-24 NOTE — TELEPHONE ENCOUNTER
[place]. The gabapentin is not helping her at all. She still has numbness, tingling and the feeling of electrical zaps from her neck down the right arm. She is taking a lot of ibuprofen. She wants to know if he has any other suggestions. She is not sleeping at all.

## 2024-07-26 RX ORDER — METHYLPREDNISOLONE 4 MG/1
TABLET ORAL
Qty: 1 KIT | Refills: 0 | Status: SHIPPED | OUTPATIENT
Start: 2024-07-26 | End: 2024-07-31

## 2024-07-26 RX ORDER — TIZANIDINE 2 MG/1
TABLET ORAL
Qty: 30 TABLET | Refills: 0 | Status: SHIPPED | OUTPATIENT
Start: 2024-07-26

## 2024-08-14 ENCOUNTER — EVALUATION (OUTPATIENT)
Age: 44
End: 2024-08-14
Payer: COMMERCIAL

## 2024-08-14 DIAGNOSIS — M54.12 CERVICAL RADICULOPATHY: Primary | ICD-10-CM

## 2024-08-14 DIAGNOSIS — M54.2 NECK PAIN ON RIGHT SIDE: ICD-10-CM

## 2024-08-14 DIAGNOSIS — Z78.9 DECREASED ACTIVITIES OF DAILY LIVING (ADL): Primary | ICD-10-CM

## 2024-08-14 DIAGNOSIS — M25.511 ACUTE PAIN OF RIGHT SHOULDER: ICD-10-CM

## 2024-08-14 DIAGNOSIS — M62.81 MUSCLE WEAKNESS (GENERALIZED): ICD-10-CM

## 2024-08-14 DIAGNOSIS — R68.89 DECREASED ACTIVITY TOLERANCE: ICD-10-CM

## 2024-08-14 DIAGNOSIS — M54.12 CERVICAL RADICULOPATHY: ICD-10-CM

## 2024-08-14 PROCEDURE — 97140 MANUAL THERAPY 1/> REGIONS: CPT | Performed by: PHYSICAL THERAPIST

## 2024-08-14 PROCEDURE — 97161 PT EVAL LOW COMPLEX 20 MIN: CPT | Performed by: PHYSICAL THERAPIST

## 2024-08-14 RX ORDER — GABAPENTIN 100 MG/1
100 CAPSULE ORAL 3 TIMES DAILY
Qty: 90 CAPSULE | Refills: 0 | Status: SHIPPED | OUTPATIENT
Start: 2024-08-14 | End: 2024-09-13

## 2024-08-14 RX ORDER — METHOCARBAMOL 750 MG/1
TABLET, FILM COATED ORAL
Qty: 40 TABLET | Refills: 0 | Status: SHIPPED | OUTPATIENT
Start: 2024-08-14

## 2024-08-14 NOTE — PROGRESS NOTES
allergy      Diagnostic exams (per chart review): MRI Cervical spine, report and images reviewed and interpreted and reveals a large disc herniation at C5-6     SUBJECTIVE     Patient Stated Goals: fix the disc    Chief complaints/history of injury:    Date symptoms began: July 1,s 2024  Nature of condition: Recent onset (initial onset within last 3 months)  Primary cause of current episode: Unspecified  Mechanism of Injury/How did symptoms start: Reports waking up one day with numbness, burning and tingling radiating pain to R UE all the way down to thumb and index finger. She has not found a comfortable position since onset. She is also not sleeping well at night. Weakness noted on the R UE. Was able to help reduce pain with slight C extension, SB L and rotation L. OTC meds have not been helping the pain significantly.    Describe current symptoms: Described pain as numb, ache, burning, tingling, throbbing, sharp and stabbing    Received previous outpatient therapy? No    Pain Assessment:  Pain location: R neck, R thumb index finger    Average Pain/symptom intensity (0-10 scale)  Last 24 hours: 8/10  Last week (1-7 days): 8/10  How often do you feel symptoms? Constant (% of time)  Description: aching, burning thumb and index finger, sharp, stabbing, and throbbing  Aggravating factors: turning head Bilateral, looking up, looking down, reading, reaching, lying down, lifting/carrying, cough/sneeze, and prolong sitting  Alleviating factors:  Minimal help with gabapentin but not significant    Neuro screen: denies numbness, tingling, and radiating pain, numbness to R UE, tingling to R UE , and radiating pain R UE, burning to the thumb and index finger    Social/Functional Hx:  Prior level of function (PLOF): Independent and active without physical limitations, Independent community ambulator, Independent household ambulator, Independent with all ADLs, and no neck pain   Current level of function (CLOF):

## 2024-08-16 ENCOUNTER — OFFICE VISIT (OUTPATIENT)
Age: 44
End: 2024-08-16
Payer: COMMERCIAL

## 2024-08-16 ENCOUNTER — PREP FOR PROCEDURE (OUTPATIENT)
Dept: ORTHOPEDIC SURGERY | Age: 44
End: 2024-08-16

## 2024-08-16 DIAGNOSIS — M54.12 CERVICAL RADICULOPATHY: ICD-10-CM

## 2024-08-16 DIAGNOSIS — M54.12 CERVICAL RADICULOPATHY: Primary | ICD-10-CM

## 2024-08-16 DIAGNOSIS — M50.20 DISPLACEMENT OF CERVICAL INTERVERTEBRAL DISC WITHOUT MYELOPATHY: Primary | ICD-10-CM

## 2024-08-16 DIAGNOSIS — M50.10 HERNIATION OF CERVICAL INTERVERTEBRAL DISC WITH RADICULOPATHY: ICD-10-CM

## 2024-08-16 PROCEDURE — 99213 OFFICE O/P EST LOW 20 MIN: CPT | Performed by: ORTHOPAEDIC SURGERY

## 2024-08-16 RX ORDER — ACETAMINOPHEN 325 MG/1
1000 TABLET ORAL ONCE
Status: CANCELLED | OUTPATIENT
Start: 2024-08-16 | End: 2024-08-16

## 2024-08-16 NOTE — PROGRESS NOTES
Name: Mansi Love  YOB: 1980  Gender: female  MRN: [id number]  Age: 43 y.o.    Chief Complaint: Right arm pain  History of present illness:    This is a very pleasant 43 y.o. female who presents with little over 2-month history of right arm pain.  She states the pain radiates down her arm into her thumb.  She has tried medications including oral steroids and gabapentin which have not improved her symptoms.  She has done physical therapy and her symptoms have not improved.  She also has weakness in her right arm      Medications:     Prior to Visit Medications    Medication Sig Taking? Authorizing Provider   gabapentin (NEURONTIN) 100 MG capsule Take 1 capsule by mouth 3 times daily for 30 days. Take 1 tablet by mouth at night for 3 nights, then increase to BID morning and night for 3 days, you may taper up to three times a day.  Cliff Torres MD   methocarbamol (ROBAXIN-750) 750 MG tablet Take 1 tablet by mouth every 6 hours as needed for spasms.  Cliff Torres MD   tiZANidine (ZANAFLEX) 2 MG tablet Start 1 po qhs as tolerated may increased during the day as tolerated.  Cliff Torres MD   ibuprofen (ADVIL;MOTRIN) 600 MG tablet Take 1 tablet by mouth 3 times daily as needed for Pain  Bennie Soto MD   ondansetron (ZOFRAN) 4 MG/2ML injection Infuse 2 mLs intravenously every 6 hours as needed for Nausea or Vomiting  Isaiah Hearn MD   docusate sodium (COLACE, DULCOLAX) 100 MG CAPS Take 100 mg by mouth 2 times daily  Isaiah Hearn MD   ketorolac (TORADOL) 10 MG tablet Take 1 tablet by mouth in the morning and 1 tablet at noon and 1 tablet in the evening and 1 tablet before bedtime. Do all this for 15 doses.  Isaiah Hearn MD   Insulin Disposable Pump (OMNIPOD 5 G6 POD, GEN 5,) MISC Inject into the skin every other day  ProviderAnika MD   NOVOLOG 100 UNIT/ML injection vial   Anika Solitario MD   Continuous Blood Gluc Sensor (DEXCOM G4 SENSOR) MISC 1 Device

## 2024-08-19 ENCOUNTER — HOSPITAL ENCOUNTER (OUTPATIENT)
Dept: SURGERY | Age: 44
Discharge: HOME | End: 2024-08-22
Payer: COMMERCIAL

## 2024-08-19 VITALS
HEIGHT: 64 IN | HEART RATE: 68 BPM | SYSTOLIC BLOOD PRESSURE: 142 MMHG | OXYGEN SATURATION: 97 % | BODY MASS INDEX: 25.95 KG/M2 | TEMPERATURE: 97.1 F | WEIGHT: 152 LBS | RESPIRATION RATE: 16 BRPM | DIASTOLIC BLOOD PRESSURE: 71 MMHG

## 2024-08-19 DIAGNOSIS — M54.12 CERVICAL RADICULOPATHY: Primary | ICD-10-CM

## 2024-08-19 LAB
ANION GAP SERPL CALC-SCNC: 14 MMOL/L (ref 9–18)
APPEARANCE UR: CLEAR
BASOPHILS # BLD: 0 K/UL (ref 0–0.2)
BASOPHILS NFR BLD: 1 % (ref 0–2)
BILIRUB UR QL: NEGATIVE
BUN SERPL-MCNC: 14 MG/DL (ref 6–23)
CALCIUM SERPL-MCNC: 9.1 MG/DL (ref 8.8–10.2)
CHLORIDE SERPL-SCNC: 100 MMOL/L (ref 98–107)
CO2 SERPL-SCNC: 24 MMOL/L (ref 20–28)
COLOR UR: ABNORMAL
CREAT SERPL-MCNC: 0.81 MG/DL (ref 0.6–1.1)
DIFFERENTIAL METHOD BLD: ABNORMAL
EKG ATRIAL RATE: 69 BPM
EKG DIAGNOSIS: NORMAL
EKG P AXIS: 75 DEGREES
EKG P-R INTERVAL: 130 MS
EKG Q-T INTERVAL: 420 MS
EKG QRS DURATION: 84 MS
EKG QTC CALCULATION (BAZETT): 450 MS
EKG R AXIS: 90 DEGREES
EKG T AXIS: 38 DEGREES
EKG VENTRICULAR RATE: 69 BPM
EOSINOPHIL # BLD: 0.1 K/UL (ref 0–0.8)
EOSINOPHIL NFR BLD: 2 % (ref 0.5–7.8)
ERYTHROCYTE [DISTWIDTH] IN BLOOD BY AUTOMATED COUNT: 11.8 % (ref 11.9–14.6)
EST. AVERAGE GLUCOSE BLD GHB EST-MCNC: 167 MG/DL
GLUCOSE SERPL-MCNC: 234 MG/DL (ref 70–99)
GLUCOSE UR STRIP.AUTO-MCNC: >1000 MG/DL
HBA1C MFR BLD: 7.5 % (ref 0–5.6)
HCT VFR BLD AUTO: 42.2 % (ref 35.8–46.3)
HGB BLD-MCNC: 13.9 G/DL (ref 11.7–15.4)
HGB UR QL STRIP: NEGATIVE
IMM GRANULOCYTES # BLD AUTO: 0 K/UL (ref 0–0.5)
IMM GRANULOCYTES NFR BLD AUTO: 0 % (ref 0–5)
KETONES UR QL STRIP.AUTO: ABNORMAL MG/DL
LEUKOCYTE ESTERASE UR QL STRIP.AUTO: NEGATIVE
LYMPHOCYTES # BLD: 1.4 K/UL (ref 0.5–4.6)
LYMPHOCYTES NFR BLD: 28 % (ref 13–44)
MCH RBC QN AUTO: 31.1 PG (ref 26.1–32.9)
MCHC RBC AUTO-ENTMCNC: 32.9 G/DL (ref 31.4–35)
MCV RBC AUTO: 94.4 FL (ref 82–102)
MONOCYTES # BLD: 0.6 K/UL (ref 0.1–1.3)
MONOCYTES NFR BLD: 12 % (ref 4–12)
MRSA DNA SPEC QL NAA+PROBE: NOT DETECTED
NEUTS SEG # BLD: 2.8 K/UL (ref 1.7–8.2)
NEUTS SEG NFR BLD: 57 % (ref 43–78)
NITRITE UR QL STRIP.AUTO: NEGATIVE
NRBC # BLD: 0 K/UL (ref 0–0.2)
PH UR STRIP: 6 (ref 5–9)
PLATELET # BLD AUTO: 216 K/UL (ref 150–450)
PMV BLD AUTO: 10.2 FL (ref 9.4–12.3)
POTASSIUM SERPL-SCNC: 4.2 MMOL/L (ref 3.5–5.1)
PROT UR STRIP-MCNC: NEGATIVE MG/DL
RBC # BLD AUTO: 4.47 M/UL (ref 4.05–5.2)
S AUREUS CPE NOSE QL NAA+PROBE: NOT DETECTED
SODIUM SERPL-SCNC: 138 MMOL/L (ref 136–145)
SP GR UR REFRACTOMETRY: 1.03 (ref 1–1.02)
UROBILINOGEN UR QL STRIP.AUTO: 1 EU/DL (ref 0.2–1)
WBC # BLD AUTO: 4.9 K/UL (ref 4.3–11.1)

## 2024-08-19 PROCEDURE — 85025 COMPLETE CBC W/AUTO DIFF WBC: CPT

## 2024-08-19 PROCEDURE — 81003 URINALYSIS AUTO W/O SCOPE: CPT

## 2024-08-19 PROCEDURE — 80048 BASIC METABOLIC PNL TOTAL CA: CPT

## 2024-08-19 PROCEDURE — 83036 HEMOGLOBIN GLYCOSYLATED A1C: CPT

## 2024-08-19 PROCEDURE — 87641 MR-STAPH DNA AMP PROBE: CPT

## 2024-08-19 PROCEDURE — 93005 ELECTROCARDIOGRAM TRACING: CPT | Performed by: ANESTHESIOLOGY

## 2024-08-19 PROCEDURE — 93010 ELECTROCARDIOGRAM REPORT: CPT | Performed by: INTERNAL MEDICINE

## 2024-08-19 ASSESSMENT — PAIN SCALES - GENERAL: PAINLEVEL_OUTOF10: 9

## 2024-08-19 ASSESSMENT — PAIN DESCRIPTION - DESCRIPTORS: DESCRIPTORS: PRESSURE;TINGLING;NUMBNESS

## 2024-08-19 ASSESSMENT — PAIN DESCRIPTION - LOCATION: LOCATION: NECK

## 2024-08-19 NOTE — TELEPHONE ENCOUNTER
Returned call, patient explained that she was told at pre op to only take tylenol. She can't take tylenol because it crystallizes in her pump for her Diabetes and causes pump to stop. Her allergies say she is allergic to tramadol but she states that she is NOT allergic.

## 2024-08-19 NOTE — PERIOP NOTE
Patient verified name, , and surgery as listed in [facility]. Patient provided medical/health information and PTA medications to the best of their ability.    TYPE  CASE: 3  Orders per surgeon: Orders received  Labs per surgeon: CBC with diff, BMP, UA, MRSA/MSSA, Hgb A1C. Results: BS: 234  Labs per anesthesia protocol: T/S DOS.  EKG:  EKG 8.19.24    Glucose 234  . Instructed Patient that if blood sugar 300 or > , surgery may be cancelled. Pt voice understanding.SN instruct pt to contact [phone] for any complications.     MRSA/MSSA swab collected; pharmacy to review and dose antibiotic as appropriate.     Patient provided with and instructed on education handouts including Guide to Surgery, blood transfusions, pain management, and hand hygiene for the family and community, and [facility] brochure.     Road to Recovery Spine surgery patient guide given. Instructed on incentive spirometry.    Hibiclens and instructions given per hospital policy.    Original medication prescription bottles were visualized during patient appointment.     Patient teach back successful and patient demonstrates knowledge of instruction.     
Labs reviewed, UA with Ketones, Labs automatically routed to ordering provider via Epic documentation and telephone encouter sent to Екатерина THACKER NP.  MRSA/MSSA received in lab with pending results.  
breathe out (exhale) completely.  Breathe in (inhale) slowly through your mouth as deeply as you can. As you take the breath, you will see the piston rise inside the large column. While the piston rises, the indicator on the right should move upwards. It should stay in between the 2 arrows (see Figure 1).  Try to get the piston as high as you can, while keeping the indicator between the arrows. If the indicator doesn't stay between the arrows, you're breathing either too fast or too slow.  When you get it as high as you can, hold your breath for 10 seconds, or as long as possible. While you're holding your breath, the piston will slowly fall to the base of the spirometer.  Once the piston reaches the bottom of the spirometer, breathe out slowly through your mouth. Rest for a few seconds.  Repeat 10 times. Try to get the piston to the same level with each breath.  After each set of 10 breaths, try to cough as coughing will help loosen or clear any mucus in your lungs.  Put the marker at the level the piston reached on your incentive spirometer. This will be your goal next time.  Repeat these steps every hour that you're awake.  Cover the mouthpiece of the incentive spirometer when you aren't using it.

## 2024-08-19 NOTE — TELEPHONE ENCOUNTER
Pt came by the office after her pre op apt. Pt was told that she can only take tylenol prior to surgery. But the pt can not take tylenol. Pt was told to ask if there is another medication she can be prescribed. She also wanted to clarify a question about Tramadol. I let pt know that someone would contact her.

## 2024-08-19 NOTE — PROGRESS NOTES
Pre-Assessment Surgery Review      Patient ID:  Mansi Love  [id number]  43 y.o.  1980  Surgeon: Dr Torres  Date of Surgery: 2024  Procedure:   C5-C6 Anterior Cervical Discectomy and Fusion   Primary Care Physician: Zion Multani [phone]  Specialty Physician(s):      Subjective:   Mansi Love is a 43 y.o. White (non-) female who presents for preoperative evaluation. This is a preoperative chart review note based on data collected by the nurse at the surgical Pre-Assessment visit.    Past Medical History:   Diagnosis Date    Asthma     Allergy-Induced.  Very rare rescue inhaler use    Herniation of cervical intervertebral disc with radiculopathy     ACDF scheduled 24    History of seizure     X1 prior to diabetes dx and episode X1 due to elevated hyperglycemia(pt insulin pump stop working)- no recent seizure episodes.    Ovarian cyst     Pain involving joint of finger of left hand     Perennial allergic rhinitis with seasonal variation     Type 1 diabetes mellitus (HCC)     Dexacom and insulin pump/AVG BS:/s.s of hypoglycemia @100/Hemoglobin A1C 7.5      Past Surgical History:   Procedure Laterality Date     SECTION  2006    X1    FINGER TRIGGER RELEASE Right     HAND SURGERY Left     two spots removed from left hand    HYSTERECTOMY (CERVIX STATUS UNKNOWN) Bilateral 2023    HYSTERECTOMY ABDOMINAL LAPAROSCOPIC WITH BILATERAL SAPLINGECTOMY performed by Isaiah Hearn MD at [facility] [place] MAIN OR    TUMOR EXCISION Left     left pinky finger    TYMPANOSTOMY TUBE PLACEMENT      WISDOM TOOTH EXTRACTION       Family History   Problem Relation Age of Onset    No Known Problems Mother     Heart Attack Father 31    Cancer Maternal Grandfather         Lung    Heart Disease Maternal Grandfather     Diabetes Father         Type 1    Heart Disease Paternal Grandfather       Social History     Tobacco Use    Smoking status: Never    Smokeless tobacco: Never   Substance Use

## 2024-08-20 ENCOUNTER — TELEPHONE (OUTPATIENT)
Dept: ORTHOPEDIC SURGERY | Age: 44
End: 2024-08-20

## 2024-08-20 RX ORDER — TRAMADOL HYDROCHLORIDE 50 MG/1
50 TABLET ORAL EVERY 6 HOURS PRN
Qty: 28 TABLET | Refills: 0 | Status: SHIPPED | OUTPATIENT
Start: 2024-08-20 | End: 2024-08-27

## 2024-08-25 ENCOUNTER — ANESTHESIA EVENT (OUTPATIENT)
Dept: SURGERY | Age: 44
End: 2024-08-25
Payer: COMMERCIAL

## 2024-08-26 ENCOUNTER — ANESTHESIA (OUTPATIENT)
Dept: SURGERY | Age: 44
End: 2024-08-26
Payer: COMMERCIAL

## 2024-08-26 ENCOUNTER — APPOINTMENT (OUTPATIENT)
Dept: GENERAL RADIOLOGY | Age: 44
End: 2024-08-26
Attending: ORTHOPAEDIC SURGERY
Payer: COMMERCIAL

## 2024-08-26 ENCOUNTER — HOSPITAL ENCOUNTER (OUTPATIENT)
Age: 44
Discharge: HOME HEALTH | End: 2024-08-27
Attending: ORTHOPAEDIC SURGERY | Admitting: ORTHOPAEDIC SURGERY
Payer: COMMERCIAL

## 2024-08-26 DIAGNOSIS — Z98.1 S/P CERVICAL SPINAL FUSION: Primary | ICD-10-CM

## 2024-08-26 DIAGNOSIS — M50.10 HERNIATION OF CERVICAL INTERVERTEBRAL DISC WITH RADICULOPATHY: ICD-10-CM

## 2024-08-26 LAB
ABO + RH BLD: NORMAL
BLOOD GROUP ANTIBODIES SERPL: NORMAL
GLUCOSE BLD STRIP.AUTO-MCNC: 123 MG/DL (ref 65–100)
GLUCOSE BLD STRIP.AUTO-MCNC: 171 MG/DL (ref 65–100)
GLUCOSE BLD STRIP.AUTO-MCNC: 175 MG/DL (ref 65–100)
GLUCOSE BLD STRIP.AUTO-MCNC: 214 MG/DL (ref 65–100)
GLUCOSE BLD STRIP.AUTO-MCNC: 219 MG/DL (ref 65–100)
GLUCOSE BLD STRIP.AUTO-MCNC: 280 MG/DL (ref 65–100)
SERVICE CMNT-IMP: ABNORMAL
SPECIMEN EXP DATE BLD: NORMAL

## 2024-08-26 PROCEDURE — 97530 THERAPEUTIC ACTIVITIES: CPT

## 2024-08-26 PROCEDURE — 97161 PT EVAL LOW COMPLEX 20 MIN: CPT

## 2024-08-26 PROCEDURE — 6370000000 HC RX 637 (ALT 250 FOR IP): Performed by: ORTHOPAEDIC SURGERY

## 2024-08-26 PROCEDURE — 2580000003 HC RX 258: Performed by: ORTHOPAEDIC SURGERY

## 2024-08-26 PROCEDURE — 3700000000 HC ANESTHESIA ATTENDED CARE: Performed by: ORTHOPAEDIC SURGERY

## 2024-08-26 PROCEDURE — 6370000000 HC RX 637 (ALT 250 FOR IP)

## 2024-08-26 PROCEDURE — 2500000003 HC RX 250 WO HCPCS

## 2024-08-26 PROCEDURE — 7100000000 HC PACU RECOVERY - FIRST 15 MIN: Performed by: ORTHOPAEDIC SURGERY

## 2024-08-26 PROCEDURE — 2500000003 HC RX 250 WO HCPCS: Performed by: ORTHOPAEDIC SURGERY

## 2024-08-26 PROCEDURE — 72020 X-RAY EXAM OF SPINE 1 VIEW: CPT

## 2024-08-26 PROCEDURE — 22853 INSJ BIOMECHANICAL DEVICE: CPT | Performed by: ORTHOPAEDIC SURGERY

## 2024-08-26 PROCEDURE — 6360000002 HC RX W HCPCS: Performed by: ANESTHESIOLOGY

## 2024-08-26 PROCEDURE — 6370000000 HC RX 637 (ALT 250 FOR IP): Performed by: NURSE PRACTITIONER

## 2024-08-26 PROCEDURE — 7100000001 HC PACU RECOVERY - ADDTL 15 MIN: Performed by: ORTHOPAEDIC SURGERY

## 2024-08-26 PROCEDURE — 2720000010 HC SURG SUPPLY STERILE: Performed by: ORTHOPAEDIC SURGERY

## 2024-08-26 PROCEDURE — C1713 ANCHOR/SCREW BN/BN,TIS/BN: HCPCS | Performed by: ORTHOPAEDIC SURGERY

## 2024-08-26 PROCEDURE — 82962 GLUCOSE BLOOD TEST: CPT

## 2024-08-26 PROCEDURE — 3700000001 HC ADD 15 MINUTES (ANESTHESIA): Performed by: ORTHOPAEDIC SURGERY

## 2024-08-26 PROCEDURE — 22551 ARTHRD ANT NTRBDY CERVICAL: CPT | Performed by: ORTHOPAEDIC SURGERY

## 2024-08-26 PROCEDURE — 86850 RBC ANTIBODY SCREEN: CPT

## 2024-08-26 PROCEDURE — 3600000014 HC SURGERY LEVEL 4 ADDTL 15MIN: Performed by: ORTHOPAEDIC SURGERY

## 2024-08-26 PROCEDURE — 6360000002 HC RX W HCPCS: Performed by: ORTHOPAEDIC SURGERY

## 2024-08-26 PROCEDURE — 6370000000 HC RX 637 (ALT 250 FOR IP): Performed by: ANESTHESIOLOGY

## 2024-08-26 PROCEDURE — 2580000003 HC RX 258: Performed by: ANESTHESIOLOGY

## 2024-08-26 PROCEDURE — 22845 INSERT SPINE FIXATION DEVICE: CPT | Performed by: ORTHOPAEDIC SURGERY

## 2024-08-26 PROCEDURE — 3600000004 HC SURGERY LEVEL 4 BASE: Performed by: ORTHOPAEDIC SURGERY

## 2024-08-26 PROCEDURE — 6360000002 HC RX W HCPCS

## 2024-08-26 PROCEDURE — 86900 BLOOD TYPING SEROLOGIC ABO: CPT

## 2024-08-26 PROCEDURE — 2709999900 HC NON-CHARGEABLE SUPPLY: Performed by: ORTHOPAEDIC SURGERY

## 2024-08-26 PROCEDURE — 86901 BLOOD TYPING SEROLOGIC RH(D): CPT

## 2024-08-26 DEVICE — BIO DBM PUTTY
Type: IMPLANTABLE DEVICE | Site: SPINE CERVICAL | Status: FUNCTIONAL
Brand: BIO DBM

## 2024-08-26 DEVICE — ANTERIOR CERVICAL CAGE
Type: IMPLANTABLE DEVICE | Site: SPINE CERVICAL | Status: FUNCTIONAL
Brand: TRITANIUM C

## 2024-08-26 DEVICE — GRAFT BNE XS: Type: IMPLANTABLE DEVICE | Site: SPINE CERVICAL | Status: FUNCTIONAL

## 2024-08-26 DEVICE — SCREW SPNL SELF-STARTING 4X12 MM VAR CERV PLATE SYS OZARK: Type: IMPLANTABLE DEVICE | Site: SPINE CERVICAL | Status: FUNCTIONAL

## 2024-08-26 DEVICE — IMPLANTABLE DEVICE: Type: IMPLANTABLE DEVICE | Site: SPINE CERVICAL | Status: FUNCTIONAL

## 2024-08-26 RX ORDER — FAMOTIDINE 20 MG/1
20 TABLET, FILM COATED ORAL 2 TIMES DAILY
Status: DISCONTINUED | OUTPATIENT
Start: 2024-08-26 | End: 2024-08-27 | Stop reason: HOSPADM

## 2024-08-26 RX ORDER — ROCURONIUM BROMIDE 10 MG/ML
INJECTION, SOLUTION INTRAVENOUS PRN
Status: DISCONTINUED | OUTPATIENT
Start: 2024-08-26 | End: 2024-08-26 | Stop reason: SDUPTHER

## 2024-08-26 RX ORDER — SCOLOPAMINE TRANSDERMAL SYSTEM 1 MG/1
1 PATCH, EXTENDED RELEASE TRANSDERMAL
Status: DISCONTINUED | OUTPATIENT
Start: 2024-08-26 | End: 2024-08-27 | Stop reason: HOSPADM

## 2024-08-26 RX ORDER — LIDOCAINE HYDROCHLORIDE 20 MG/ML
INJECTION, SOLUTION EPIDURAL; INFILTRATION; INTRACAUDAL; PERINEURAL PRN
Status: DISCONTINUED | OUTPATIENT
Start: 2024-08-26 | End: 2024-08-26 | Stop reason: SDUPTHER

## 2024-08-26 RX ORDER — HYDROMORPHONE HYDROCHLORIDE 2 MG/ML
0.5 INJECTION, SOLUTION INTRAMUSCULAR; INTRAVENOUS; SUBCUTANEOUS EVERY 10 MIN PRN
Status: COMPLETED | OUTPATIENT
Start: 2024-08-26 | End: 2024-08-26

## 2024-08-26 RX ORDER — HYDROMORPHONE HYDROCHLORIDE 1 MG/ML
0.5 INJECTION, SOLUTION INTRAMUSCULAR; INTRAVENOUS; SUBCUTANEOUS
Status: DISCONTINUED | OUTPATIENT
Start: 2024-08-26 | End: 2024-08-27 | Stop reason: HOSPADM

## 2024-08-26 RX ORDER — SODIUM CHLORIDE 9 MG/ML
INJECTION, SOLUTION INTRAVENOUS PRN
Status: DISCONTINUED | OUTPATIENT
Start: 2024-08-26 | End: 2024-08-27 | Stop reason: HOSPADM

## 2024-08-26 RX ORDER — FENTANYL CITRATE 50 UG/ML
INJECTION, SOLUTION INTRAMUSCULAR; INTRAVENOUS PRN
Status: DISCONTINUED | OUTPATIENT
Start: 2024-08-26 | End: 2024-08-26 | Stop reason: SDUPTHER

## 2024-08-26 RX ORDER — DOCUSATE SODIUM 100 MG/1
100 CAPSULE, LIQUID FILLED ORAL 2 TIMES DAILY
Qty: 60 CAPSULE | Refills: 0 | Status: SHIPPED | OUTPATIENT
Start: 2024-08-26 | End: 2024-09-25

## 2024-08-26 RX ORDER — DIPHENHYDRAMINE HCL 25 MG
25 CAPSULE ORAL EVERY 6 HOURS PRN
Status: DISCONTINUED | OUTPATIENT
Start: 2024-08-26 | End: 2024-08-27 | Stop reason: HOSPADM

## 2024-08-26 RX ORDER — OXYCODONE HYDROCHLORIDE 5 MG/1
10 TABLET ORAL EVERY 4 HOURS PRN
Status: DISCONTINUED | OUTPATIENT
Start: 2024-08-26 | End: 2024-08-26

## 2024-08-26 RX ORDER — SODIUM CHLORIDE 0.9 % (FLUSH) 0.9 %
5-40 SYRINGE (ML) INJECTION EVERY 12 HOURS SCHEDULED
Status: DISCONTINUED | OUTPATIENT
Start: 2024-08-26 | End: 2024-08-27 | Stop reason: HOSPADM

## 2024-08-26 RX ORDER — DIPHENHYDRAMINE HYDROCHLORIDE 50 MG/ML
12.5 INJECTION INTRAMUSCULAR; INTRAVENOUS
Status: DISCONTINUED | OUTPATIENT
Start: 2024-08-26 | End: 2024-08-26 | Stop reason: HOSPADM

## 2024-08-26 RX ORDER — DEXTROSE MONOHYDRATE 100 MG/ML
INJECTION, SOLUTION INTRAVENOUS CONTINUOUS PRN
Status: DISCONTINUED | OUTPATIENT
Start: 2024-08-26 | End: 2024-08-27 | Stop reason: HOSPADM

## 2024-08-26 RX ORDER — DEXMEDETOMIDINE HYDROCHLORIDE 4 UG/ML
INJECTION, SOLUTION INTRAVENOUS CONTINUOUS PRN
Status: DISCONTINUED | OUTPATIENT
Start: 2024-08-26 | End: 2024-08-26 | Stop reason: SDUPTHER

## 2024-08-26 RX ORDER — IBUPROFEN 600 MG/1
1 TABLET ORAL PRN
Status: DISCONTINUED | OUTPATIENT
Start: 2024-08-26 | End: 2024-08-27 | Stop reason: HOSPADM

## 2024-08-26 RX ORDER — NALOXONE HYDROCHLORIDE 0.4 MG/ML
INJECTION, SOLUTION INTRAMUSCULAR; INTRAVENOUS; SUBCUTANEOUS PRN
Status: DISCONTINUED | OUTPATIENT
Start: 2024-08-26 | End: 2024-08-26 | Stop reason: HOSPADM

## 2024-08-26 RX ORDER — LIDOCAINE HYDROCHLORIDE 20 MG/ML
15 SOLUTION OROPHARYNGEAL
Status: DISCONTINUED | OUTPATIENT
Start: 2024-08-26 | End: 2024-08-27 | Stop reason: HOSPADM

## 2024-08-26 RX ORDER — GLYCOPYRROLATE 0.2 MG/ML
INJECTION INTRAMUSCULAR; INTRAVENOUS PRN
Status: DISCONTINUED | OUTPATIENT
Start: 2024-08-26 | End: 2024-08-26 | Stop reason: SDUPTHER

## 2024-08-26 RX ORDER — BISACODYL 5 MG/1
5 TABLET, DELAYED RELEASE ORAL DAILY
Status: DISCONTINUED | OUTPATIENT
Start: 2024-08-26 | End: 2024-08-27 | Stop reason: HOSPADM

## 2024-08-26 RX ORDER — SODIUM CHLORIDE 0.9 % (FLUSH) 0.9 %
5-40 SYRINGE (ML) INJECTION PRN
Status: DISCONTINUED | OUTPATIENT
Start: 2024-08-26 | End: 2024-08-27 | Stop reason: HOSPADM

## 2024-08-26 RX ORDER — METHOCARBAMOL 750 MG/1
750 TABLET, FILM COATED ORAL EVERY 8 HOURS PRN
Status: COMPLETED | OUTPATIENT
Start: 2024-08-26 | End: 2024-08-27

## 2024-08-26 RX ORDER — SODIUM CHLORIDE 0.9 % (FLUSH) 0.9 %
5-40 SYRINGE (ML) INJECTION EVERY 12 HOURS SCHEDULED
Status: DISCONTINUED | OUTPATIENT
Start: 2024-08-26 | End: 2024-08-26 | Stop reason: HOSPADM

## 2024-08-26 RX ORDER — SODIUM CHLORIDE, SODIUM LACTATE, POTASSIUM CHLORIDE, CALCIUM CHLORIDE 600; 310; 30; 20 MG/100ML; MG/100ML; MG/100ML; MG/100ML
INJECTION, SOLUTION INTRAVENOUS CONTINUOUS
Status: DISCONTINUED | OUTPATIENT
Start: 2024-08-26 | End: 2024-08-26 | Stop reason: HOSPADM

## 2024-08-26 RX ORDER — PROPOFOL 10 MG/ML
INJECTION, EMULSION INTRAVENOUS PRN
Status: DISCONTINUED | OUTPATIENT
Start: 2024-08-26 | End: 2024-08-26 | Stop reason: SDUPTHER

## 2024-08-26 RX ORDER — DEXAMETHASONE SODIUM PHOSPHATE 10 MG/ML
INJECTION INTRAMUSCULAR; INTRAVENOUS PRN
Status: DISCONTINUED | OUTPATIENT
Start: 2024-08-26 | End: 2024-08-26 | Stop reason: SDUPTHER

## 2024-08-26 RX ORDER — FENTANYL CITRATE 50 UG/ML
100 INJECTION, SOLUTION INTRAMUSCULAR; INTRAVENOUS
Status: DISCONTINUED | OUTPATIENT
Start: 2024-08-26 | End: 2024-08-26 | Stop reason: HOSPADM

## 2024-08-26 RX ORDER — TRAMADOL HYDROCHLORIDE 50 MG/1
50 TABLET ORAL EVERY 6 HOURS PRN
Status: DISCONTINUED | OUTPATIENT
Start: 2024-08-26 | End: 2024-08-27

## 2024-08-26 RX ORDER — SENNA AND DOCUSATE SODIUM 50; 8.6 MG/1; MG/1
1 TABLET, FILM COATED ORAL 2 TIMES DAILY
Status: DISCONTINUED | OUTPATIENT
Start: 2024-08-26 | End: 2024-08-27 | Stop reason: HOSPADM

## 2024-08-26 RX ORDER — NEOSTIGMINE METHYLSULFATE 1 MG/ML
INJECTION INTRAVENOUS PRN
Status: DISCONTINUED | OUTPATIENT
Start: 2024-08-26 | End: 2024-08-26 | Stop reason: SDUPTHER

## 2024-08-26 RX ORDER — DEXMEDETOMIDINE HYDROCHLORIDE 100 UG/ML
INJECTION, SOLUTION INTRAVENOUS PRN
Status: DISCONTINUED | OUTPATIENT
Start: 2024-08-26 | End: 2024-08-26 | Stop reason: SDUPTHER

## 2024-08-26 RX ORDER — GABAPENTIN 100 MG/1
100 CAPSULE ORAL 3 TIMES DAILY
Status: DISCONTINUED | OUTPATIENT
Start: 2024-08-26 | End: 2024-08-27 | Stop reason: HOSPADM

## 2024-08-26 RX ORDER — ONDANSETRON 4 MG/1
4 TABLET, ORALLY DISINTEGRATING ORAL EVERY 8 HOURS PRN
Status: DISCONTINUED | OUTPATIENT
Start: 2024-08-26 | End: 2024-08-27 | Stop reason: HOSPADM

## 2024-08-26 RX ORDER — ONDANSETRON 2 MG/ML
4 INJECTION INTRAMUSCULAR; INTRAVENOUS
Status: DISCONTINUED | OUTPATIENT
Start: 2024-08-26 | End: 2024-08-26 | Stop reason: HOSPADM

## 2024-08-26 RX ORDER — HYDROMORPHONE HYDROCHLORIDE 1 MG/ML
0.25 INJECTION, SOLUTION INTRAMUSCULAR; INTRAVENOUS; SUBCUTANEOUS
Status: DISCONTINUED | OUTPATIENT
Start: 2024-08-26 | End: 2024-08-27 | Stop reason: HOSPADM

## 2024-08-26 RX ORDER — ONDANSETRON 2 MG/ML
4 INJECTION INTRAMUSCULAR; INTRAVENOUS EVERY 6 HOURS PRN
Status: DISCONTINUED | OUTPATIENT
Start: 2024-08-26 | End: 2024-08-27 | Stop reason: HOSPADM

## 2024-08-26 RX ORDER — OXYCODONE HYDROCHLORIDE 5 MG/1
5 TABLET ORAL EVERY 4 HOURS PRN
Status: DISCONTINUED | OUTPATIENT
Start: 2024-08-26 | End: 2024-08-26

## 2024-08-26 RX ORDER — DIPHENHYDRAMINE HYDROCHLORIDE 50 MG/ML
25 INJECTION INTRAMUSCULAR; INTRAVENOUS EVERY 6 HOURS PRN
Status: DISCONTINUED | OUTPATIENT
Start: 2024-08-26 | End: 2024-08-27 | Stop reason: HOSPADM

## 2024-08-26 RX ORDER — METOCLOPRAMIDE HYDROCHLORIDE 5 MG/ML
10 INJECTION INTRAMUSCULAR; INTRAVENOUS
Status: DISCONTINUED | OUTPATIENT
Start: 2024-08-26 | End: 2024-08-26 | Stop reason: HOSPADM

## 2024-08-26 RX ORDER — FENTANYL CITRATE 50 UG/ML
25 INJECTION, SOLUTION INTRAMUSCULAR; INTRAVENOUS EVERY 5 MIN PRN
Status: DISCONTINUED | OUTPATIENT
Start: 2024-08-26 | End: 2024-08-26 | Stop reason: HOSPADM

## 2024-08-26 RX ORDER — TRANEXAMIC ACID 100 MG/ML
INJECTION, SOLUTION INTRAVENOUS PRN
Status: DISCONTINUED | OUTPATIENT
Start: 2024-08-26 | End: 2024-08-26 | Stop reason: SDUPTHER

## 2024-08-26 RX ORDER — ONDANSETRON 2 MG/ML
INJECTION INTRAMUSCULAR; INTRAVENOUS PRN
Status: DISCONTINUED | OUTPATIENT
Start: 2024-08-26 | End: 2024-08-26 | Stop reason: SDUPTHER

## 2024-08-26 RX ORDER — ACETAMINOPHEN 500 MG
1000 TABLET ORAL ONCE
Status: DISCONTINUED | OUTPATIENT
Start: 2024-08-26 | End: 2024-08-26 | Stop reason: HOSPADM

## 2024-08-26 RX ORDER — SODIUM CHLORIDE 9 MG/ML
INJECTION, SOLUTION INTRAVENOUS CONTINUOUS
Status: DISCONTINUED | OUTPATIENT
Start: 2024-08-26 | End: 2024-08-27 | Stop reason: HOSPADM

## 2024-08-26 RX ORDER — MIDAZOLAM HYDROCHLORIDE 2 MG/2ML
2 INJECTION, SOLUTION INTRAMUSCULAR; INTRAVENOUS
Status: DISCONTINUED | OUTPATIENT
Start: 2024-08-26 | End: 2024-08-26 | Stop reason: HOSPADM

## 2024-08-26 RX ORDER — OXYCODONE HYDROCHLORIDE 5 MG/1
5 TABLET ORAL EVERY 6 HOURS PRN
Qty: 20 TABLET | Refills: 0 | Status: SHIPPED | OUTPATIENT
Start: 2024-08-26 | End: 2024-08-27 | Stop reason: HOSPADM

## 2024-08-26 RX ADMIN — CEFAZOLIN 2000 MG: 10 INJECTION, POWDER, FOR SOLUTION INTRAVENOUS at 15:49

## 2024-08-26 RX ADMIN — GABAPENTIN 100 MG: 100 CAPSULE ORAL at 15:49

## 2024-08-26 RX ADMIN — ONDANSETRON 4 MG: 2 INJECTION INTRAMUSCULAR; INTRAVENOUS at 08:48

## 2024-08-26 RX ADMIN — FAMOTIDINE 20 MG: 10 INJECTION, SOLUTION INTRAVENOUS at 21:05

## 2024-08-26 RX ADMIN — DEXAMETHASONE SODIUM PHOSPHATE 7 MG: 10 INJECTION INTRAMUSCULAR; INTRAVENOUS at 07:52

## 2024-08-26 RX ADMIN — FENTANYL CITRATE 100 MCG: 50 INJECTION, SOLUTION INTRAMUSCULAR; INTRAVENOUS at 07:40

## 2024-08-26 RX ADMIN — LIDOCAINE HYDROCHLORIDE 15 ML: 20 SOLUTION ORAL at 23:01

## 2024-08-26 RX ADMIN — PROPOFOL 160 MG: 10 INJECTION, EMULSION INTRAVENOUS at 07:40

## 2024-08-26 RX ADMIN — HYDROMORPHONE HYDROCHLORIDE 0.5 MG: 2 INJECTION INTRAMUSCULAR; INTRAVENOUS; SUBCUTANEOUS at 12:15

## 2024-08-26 RX ADMIN — TRAMADOL HYDROCHLORIDE 50 MG: 50 TABLET ORAL at 17:43

## 2024-08-26 RX ADMIN — PROPOFOL 40 MG: 10 INJECTION, EMULSION INTRAVENOUS at 08:08

## 2024-08-26 RX ADMIN — GLYCOPYRROLATE 0.8 MG: 0.2 INJECTION INTRAMUSCULAR; INTRAVENOUS at 10:40

## 2024-08-26 RX ADMIN — CEFAZOLIN 2000 MG: 10 INJECTION, POWDER, FOR SOLUTION INTRAVENOUS at 23:01

## 2024-08-26 RX ADMIN — SODIUM CHLORIDE, POTASSIUM CHLORIDE, SODIUM LACTATE AND CALCIUM CHLORIDE: 600; 310; 30; 20 INJECTION, SOLUTION INTRAVENOUS at 06:15

## 2024-08-26 RX ADMIN — BISACODYL 5 MG: 5 TABLET, COATED ORAL at 15:49

## 2024-08-26 RX ADMIN — DEXMEDETOMIDINE 8 MCG: 100 INJECTION, SOLUTION, CONCENTRATE INTRAVENOUS at 08:05

## 2024-08-26 RX ADMIN — NEOSTIGMINE METHYLSULFATE 4 MG: 1.02 INJECTION INTRAVENOUS at 10:40

## 2024-08-26 RX ADMIN — INSULIN HUMAN 10 UNITS: 100 INJECTION, SOLUTION PARENTERAL at 09:22

## 2024-08-26 RX ADMIN — HYDROMORPHONE HYDROCHLORIDE 0.5 MG: 2 INJECTION INTRAMUSCULAR; INTRAVENOUS; SUBCUTANEOUS at 11:46

## 2024-08-26 RX ADMIN — GABAPENTIN 100 MG: 100 CAPSULE ORAL at 21:06

## 2024-08-26 RX ADMIN — VANCOMYCIN HYDROCHLORIDE 1000 MG: 1 INJECTION, POWDER, LYOPHILIZED, FOR SOLUTION INTRAVENOUS at 06:18

## 2024-08-26 RX ADMIN — ROCURONIUM BROMIDE 50 MG: 10 INJECTION, SOLUTION INTRAVENOUS at 07:41

## 2024-08-26 RX ADMIN — DIPHENHYDRAMINE HYDROCHLORIDE 25 MG: 50 INJECTION INTRAMUSCULAR; INTRAVENOUS at 21:05

## 2024-08-26 RX ADMIN — SODIUM CHLORIDE: 9 INJECTION, SOLUTION INTRAVENOUS at 16:05

## 2024-08-26 RX ADMIN — PROPOFOL 40 MG: 10 INJECTION, EMULSION INTRAVENOUS at 08:17

## 2024-08-26 RX ADMIN — LIDOCAINE HYDROCHLORIDE 100 MG: 20 INJECTION, SOLUTION EPIDURAL; INFILTRATION; INTRACAUDAL; PERINEURAL at 07:40

## 2024-08-26 RX ADMIN — DOCUSATE SODIUM 50 MG AND SENNOSIDES 8.6 MG 1 TABLET: 8.6; 5 TABLET, FILM COATED ORAL at 21:05

## 2024-08-26 RX ADMIN — PROPOFOL 50 MCG/KG/MIN: 10 INJECTION, EMULSION INTRAVENOUS at 08:18

## 2024-08-26 RX ADMIN — Medication 3 AMPULE: at 06:14

## 2024-08-26 RX ADMIN — TRANEXAMIC ACID 1000 MG: 100 INJECTION, SOLUTION INTRAVENOUS at 08:13

## 2024-08-26 RX ADMIN — HYDROMORPHONE HYDROCHLORIDE 0.5 MG: 1 INJECTION, SOLUTION INTRAMUSCULAR; INTRAVENOUS; SUBCUTANEOUS at 22:16

## 2024-08-26 RX ADMIN — HYDROMORPHONE HYDROCHLORIDE 0.5 MG: 2 INJECTION INTRAMUSCULAR; INTRAVENOUS; SUBCUTANEOUS at 11:27

## 2024-08-26 RX ADMIN — HYDROMORPHONE HYDROCHLORIDE 0.5 MG: 2 INJECTION INTRAMUSCULAR; INTRAVENOUS; SUBCUTANEOUS at 11:20

## 2024-08-26 RX ADMIN — SODIUM CHLORIDE, PRESERVATIVE FREE 10 ML: 5 INJECTION INTRAVENOUS at 21:07

## 2024-08-26 RX ADMIN — HYDROMORPHONE HYDROCHLORIDE 0.5 MG: 2 INJECTION INTRAMUSCULAR; INTRAVENOUS; SUBCUTANEOUS at 11:03

## 2024-08-26 RX ADMIN — HYDROMORPHONE HYDROCHLORIDE 0.5 MG: 1 INJECTION, SOLUTION INTRAMUSCULAR; INTRAVENOUS; SUBCUTANEOUS at 18:51

## 2024-08-26 RX ADMIN — HYDROMORPHONE HYDROCHLORIDE 0.5 MG: 2 INJECTION INTRAMUSCULAR; INTRAVENOUS; SUBCUTANEOUS at 13:26

## 2024-08-26 RX ADMIN — DEXMEDETOMIDINE HYDROCHLORIDE 0.5 MCG/KG/HR: 4 INJECTION, SOLUTION INTRAVENOUS at 08:01

## 2024-08-26 RX ADMIN — METHOCARBAMOL TABLETS 750 MG: 750 TABLET, COATED ORAL at 14:28

## 2024-08-26 RX ADMIN — HYDROMORPHONE HYDROCHLORIDE 0.5 MG: 1 INJECTION, SOLUTION INTRAMUSCULAR; INTRAVENOUS; SUBCUTANEOUS at 15:46

## 2024-08-26 RX ADMIN — TRANEXAMIC ACID 1000 MG: 100 INJECTION, SOLUTION INTRAVENOUS at 10:48

## 2024-08-26 ASSESSMENT — PAIN - FUNCTIONAL ASSESSMENT
PAIN_FUNCTIONAL_ASSESSMENT: 0-10
PAIN_FUNCTIONAL_ASSESSMENT: ACTIVITIES ARE NOT PREVENTED
PAIN_FUNCTIONAL_ASSESSMENT: PREVENTS OR INTERFERES SOME ACTIVE ACTIVITIES AND ADLS
PAIN_FUNCTIONAL_ASSESSMENT: ACTIVITIES ARE NOT PREVENTED
PAIN_FUNCTIONAL_ASSESSMENT: 0-10
PAIN_FUNCTIONAL_ASSESSMENT: ACTIVITIES ARE NOT PREVENTED

## 2024-08-26 ASSESSMENT — PAIN SCALES - GENERAL
PAINLEVEL_OUTOF10: 4
PAINLEVEL_OUTOF10: 7
PAINLEVEL_OUTOF10: 9
PAINLEVEL_OUTOF10: 8
PAINLEVEL_OUTOF10: 5
PAINLEVEL_OUTOF10: 6
PAINLEVEL_OUTOF10: 6
PAINLEVEL_OUTOF10: 8
PAINLEVEL_OUTOF10: 8
PAINLEVEL_OUTOF10: 9
PAINLEVEL_OUTOF10: 9
PAINLEVEL_OUTOF10: 7

## 2024-08-26 ASSESSMENT — PAIN DESCRIPTION - ORIENTATION
ORIENTATION: LEFT
ORIENTATION: MID

## 2024-08-26 ASSESSMENT — PAIN DESCRIPTION - FREQUENCY
FREQUENCY: CONTINUOUS

## 2024-08-26 ASSESSMENT — PAIN DESCRIPTION - PAIN TYPE
TYPE: SURGICAL PAIN

## 2024-08-26 ASSESSMENT — PAIN DESCRIPTION - DESCRIPTORS
DESCRIPTORS: ACHING;BURNING
DESCRIPTORS: ACHING
DESCRIPTORS: ACHING;BURNING
DESCRIPTORS: SHARP;ACHING
DESCRIPTORS: ACHING
DESCRIPTORS: ACHING
DESCRIPTORS: CRAMPING
DESCRIPTORS: ACHING;STABBING
DESCRIPTORS: ACHING

## 2024-08-26 ASSESSMENT — PAIN DESCRIPTION - LOCATION
LOCATION: NECK
LOCATION: SHOULDER
LOCATION: NECK

## 2024-08-26 ASSESSMENT — PAIN DESCRIPTION - ONSET
ONSET: ON-GOING

## 2024-08-26 NOTE — ANESTHESIA PROCEDURE NOTES
Airway  Date/Time: 8/26/2024 7:43 AM  Urgency: elective    Airway not difficult    General Information and Staff    Patient location during procedure: OR  Resident/CRNA: Estelita Segovia APRN - CRNA  Performed: resident/CRNA   Performed by: Estelita Segovia APRN - CRNA  Authorized by: Adriano Lepe MD      Indications and Patient Condition  Indications for airway management: anesthesia  Spontaneous Ventilation: absent  Sedation level: deep  Preoxygenated: yes  Patient position: sniffing  MILS not maintained throughout  Mask difficulty assessment: vent by bag mask    Final Airway Details  Final airway type: endotracheal airway      Successful airway: ETT  Cuffed: yes   Successful intubation technique: video laryngoscopy (Elective)  Facilitating devices/methods: intubating stylet  Endotracheal tube insertion site: oral  Blade: Benjamin  Blade size: #3  ETT size (mm): 7.0  Cormack-Lehane Classification: grade I - full view of glottis  Placement verified by: chest auscultation and capnometry   Measured from: lips  ETT to lips (cm): 21  Number of attempts at approach: 1  Ventilation between attempts: bag mask

## 2024-08-26 NOTE — OP NOTE
[facility] [address]   [phone]    OPERATIVE REPORT  Patient ID:Mansi Love  [id number]  1980  43 y.o.    DATE OF SURGERY: 8/26/2024    SURGEON: Cliff Torres MD    PREOPERATIVE DIAGNOSIS:  C5 - C6 stenosis.    POSTOPERATIVE DIAGNOSIS:  C5 - C6 stenosis.    PROCEDURE:     1. Anterior cervical diskectomy and fusion C5 - C6.  (CPT 11423)     2. Anterior cervical instrumentation  C5 - C6.  (CPT 17145)     3. Insertion biomechanical device  C5 - C5 (CPT 14804)    ANESTHESIA:  General.    ESTIMATED BLOOD LOSS:  10 ml    INTRAOPERATIVE COMPLICATIONS:  None.    POSTOPERATIVE CONDITION:  Stable.    IMPLANTS:   Implant Name Type Inv. Item Serial No.  Lot No. LRB No. Used Action   SPACER SPNL [id number] 6DEG ANT CERV TRITANIUM - [id number]  SPACER SPNL [id number] 6DEG ANT CERV TRITANIUM  [employer]- [id number] N/A 1 Implanted   PLATE SPNL 1 LEVEL 22 MM CERV OZARK [id number] - [id number]  PLATE SPNL 1 LEVEL 22 MM CERV OZARK [id number]  [employer]- [id number] N/A 1 Implanted   SCREW SPNL SELF-STARTING 4X12 MM MAURICE CERV PLATE SYS OZARK - [id number]  SCREW SPNL SELF-STARTING 4X12 MM MAURICE CERV PLATE SYS OZARK  [employer] HOW- [id number] N/A 4 Implanted   GRAFT BNE XS - [id number]  GRAFT BNE XS [id number] BIOLOGICA  N/A 1 Implanted   PUTTY BIO DBM 1CC - [id number]  PUTTY BIO DBM 1CC  [employer] HOW- [id number] N/A 1 Implanted       INDICATIONS FOR PROCEDURE:  The patient has had persistent symptoms of cervical radiculopathy despite conservative treatment. The preoperative studies confirmed a concordant stenotic lesion resulting in neural inpingement.   The risks, benefits and potential complications of the above listed procedures were discussed with the patient in detail and an informed consent was obtained.    DESCRIPTION OF PROCEDURE:  After adequate induction of general anesthesia the patient  to resect these structures out to the lateral border of the uncovertebral joints bilaterally.  A ball tipped nerve hook was used to palpate laterally and confirm no residual nerve root or spinal cord impingement.  This was felt to be satisfactory bilaterally.    The interbody sizing system was brought to the field and a size 7  lordotic interbody cage device fit very nicely. The appropriate size cage was selected and filled with allograft and impacted with a tamp and mallet after the wound was liberally irrigated.  The anterior cervical plating system was brought to the field and an appropriate size plate was selected and applied across  C5 - C6.  The peripheral screw holes were drilled and filled appropriate length screws.  The locking mechanism was tightened.  C-arm fluoroscopy was brought in and used to obtain AP and lateral images, both of which were felt to be satisfactory for appropriate spinal level, graft and hardware placement.    The wound was liberally irrigated.  the incision was closed in a layered fashion.  Skin glue  was applied.  Sterile dressings were applied. The patient tolerated the procedure well and was returned to the post anesthesia care unit in stable condition.

## 2024-08-26 NOTE — PROGRESS NOTES
ACUTE PHYSICAL THERAPY GOALS:   (Developed with and agreed upon by patient and/or caregiver.)    LTG:  (1.)Ms. Love will move from supine to sit and sit to supine , scoot up and down, and roll side to side in bed with INDEPENDENT within 7 treatment day(s).    (2.)Ms. Love will transfer from bed to chair and chair to bed with INDEPENDENT using the least restrictive device within 7 treatment day(s).    (3.)Ms. Love will ambulate with INDEPENDENT for 300+ feet with the least restrictive device within 7 treatment day(s).  (4.)Ms. Love will tolerate at least 23 min of dynamic standing activity to assist standing ADLs with the least restrictive device within 7 treatment days.      ________________________________________________________________________________________________      PHYSICAL THERAPY Initial Assessment, Daily Note, and AM  SPINAL PRECAUTIONS  (Link to Caseload Tracking: PT Visit Days : 1  Acknowledge Orders  Time In/Out  PT Charge Capture  Rehab Caseload Tracker    Mansi Love is a 43 y.o. female   PRIMARY DIAGNOSIS: Herniation of cervical intervertebral disc with radiculopathy  Herniation of cervical intervertebral disc with radiculopathy [M50.10]  S/P cervical spinal fusion [Z98.1]  Procedure(s) (LRB):  C5-C6 Anterior Cervical Discectomy and Fusion with Interbody Spacer/ Allograft and Instrumentation (N/A)  Day of Surgery  Reason for Referral: Generalized Muscle Weakness (M62.81)  Other lack of cordination (R27.8)  Difficulty in walking, Not elsewhere classified (R26.2)  Other abnormalities of gait and mobility (R26.89)  Outpatient in a bed: Payor: [facility] / Plan: [facility] / Product Type: *No Product type* /     ASSESSMENT:     REHAB RECOMMENDATIONS:   Recommendation to date pending progress:  Setting:  Home Health Therapy    Equipment:    None     ASSESSMENT:  Ms. Love presents in supine, agreeable to session.She is educated on spinal precautions.       Upon entering, Pnt is  IV    RESTRICTIONS/PRECAUTIONS:                    GROSS EVALUATION:  Intact Impaired (Comments):   AROM []  Spinal precautions   PROM []    Strength []  Mild weakness of R  strength   Balance []  Fair in ambulation   Posture [] Forward Head  Rounded Shoulders   Sensation [x]     Coordination [x]      Tone [x]     Edema [x]    Activity Tolerance []  Limited by pain    []      COGNITION/  PERCEPTION: Intact Impaired (Comments):   Orientation [x]     Vision [x]     Hearing [x]     Cognition  [x]       MOBILITY: I Mod I S SBA CGA Min Mod Max Total  NT x2 Comments:   Bed Mobility    Rolling [] [] [] [] [] [x] [] [] [] [] []    Supine to Sit [] [] [] [] [] [x] [] [] [] [] []    Scooting [] [] [] [] [] [x] [] [] [] [] []    Sit to Supine [] [] [] [] [] [x] [] [] [] [] []    Transfers    Sit to Stand [] [] [] [] [] [x] [] [] [] [] []    Bed to Chair [] [] [] [] [] [x] [] [] [] [] []    Stand to Sit [] [] [] [] [] [x] [] [] [] [] []     [] [] [] [] [] [] [] [] [] [] []    I=Independent, Mod I=Modified Independent, S=Supervision, SBA=Standby Assistance, CGA=Contact Guard Assistance,   Min=Minimal Assistance, Mod=Moderate Assistance, Max=Maximal Assistance, Total=Total Assistance, NT=Not Tested    GAIT: I Mod I S SBA CGA Min Mod Max Total  NT x2 Comments:   Level of Assistance [] [] [] [] [] [x] [] [] [] [] []    Distance   2 x 60 feet    DME Rolling Walker    Gait Quality Decreased david , Decreased step clearance, Decreased step length, and Decreased stance    Weightbearing Status      Stairs      I=Independent, Mod I=Modified Independent, S=Supervision, SBA=Standby Assistance, CGA=Contact Guard Assistance,   Min=Minimal Assistance, Mod=Moderate Assistance, Max=Maximal Assistance, Total=Total Assistance, NT=Not Tested    PLAN:   FREQUENCY AND DURATION: BID for duration of hospital stay or until stated goals are met, whichever comes first.    THERAPY PROGNOSIS: Excellent    PROBLEM LIST:   (Skilled intervention is

## 2024-08-26 NOTE — ANESTHESIA POSTPROCEDURE EVALUATION
Department of Anesthesiology  Postprocedure Note    Patient: Mansi Love  MRN: [id number]  YOB: 1980  Date of evaluation: 8/26/2024    Procedure Summary       Date: 08/26/24 Room / Location: [facility] MAIN OR [address] [place] [address] MAIN OR    Anesthesia Start: 0722 Anesthesia Stop: 1103    Procedure: C5-C6 Anterior Cervical Discectomy and Fusion with Interbody Spacer/ Allograft and Instrumentation (Spine Cervical) Diagnosis:       Herniation of cervical intervertebral disc with radiculopathy      (Herniation of cervical intervertebral disc with radiculopathy [M50.10])    Providers: Cliff Torres MD Responsible Provider: Adriano Lepe MD    Anesthesia Type: general ASA Status: 3            Anesthesia Type: No value filed.    Yudy Phase I: Yudy Score: 8    Yudy Phase II:      Anesthesia Post Evaluation    Patient location during evaluation: PACU  Patient participation: complete - patient participated  Level of consciousness: awake and awake and alert  Airway patency: patent  Nausea & Vomiting: no nausea  Cardiovascular status: hemodynamically stable  Respiratory status: acceptable  Hydration status: euvolemic  Multimodal analgesia pain management approach  Pain management: adequate    No notable events documented.

## 2024-08-26 NOTE — PROGRESS NOTES
Name: Mansi Love  YOB: 1980  Gender: female  MRN: [id number]  Age: 43 y.o.    Chief Complaint: Right arm pain  History of present illness:    This is a very pleasant 43 y.o. female who presents with little over 2-month history of right arm pain.  She states the pain radiates down her arm into her thumb.  She has tried medications including oral steroids and gabapentin which have not improved her symptoms.  She has done physical therapy and her symptoms have not improved.  She also has weakness in her right arm      Medications:     Prior to Visit Medications    Medication Sig Taking? Authorizing Provider   traMADol (ULTRAM) 50 MG tablet Take 1 tablet by mouth every 6 hours as needed for Pain for up to 7 days. Intended supply: 7 days. Take lowest dose possible to manage pain Max Daily Amount: 200 mg Yes Cliff Torres MD   gabapentin (NEURONTIN) 100 MG capsule Take 1 capsule by mouth 3 times daily for 30 days. Take 1 tablet by mouth at night for 3 nights, then increase to BID morning and night for 3 days, you may taper up to three times a day.  Patient taking differently: Take 2 capsules by mouth 3 times daily. T Yes Cliff Torres MD   Insulin Disposable Pump (OMNIPOD 5 G6 POD, GEN 5,) MISC Inject into the skin every other day Yes ProviderAnika MD   NOVOLOG 100 UNIT/ML injection vial Insulin pump with Novolog insulin Yes Anika Solitario MD   Insulin Degludec 200 UNIT/ML SOPN Inject into the skin Yes Automatic Reconciliation, Ar   methocarbamol (ROBAXIN-750) 750 MG tablet Take 1 tablet by mouth every 6 hours as needed for spasms.  Cliff Torres MD   Continuous Blood Gluc Sensor (DEXCOM G4 SENSOR) MISC 1 Device by Other route continuous  ProviderAnika MD       Allergies:     Allergies   Allergen Reactions    Sulfamethoxazole-Trimethoprim Anaphylaxis and Rash    Peppermint Oil Hives    Oxycodone-Acetaminophen Hives     Hives.    Allergic to oxycodone not  adjacent segment disease; instrumentation failure; neck pain.  We discussed the goal of surgery which is to relieve her of her arm pain.  Patient has exhausted conservative treatment at this point and wished to proceed with operative invention.  Patient was in agreement with the plan      Cliff Torres MD  Orthopaedic Spine Surgery     08/26/24  7:13 AM    All or part of this note was transcribed using dictation software.  Although the note was proofread, grammatical and spelling errors may occur.

## 2024-08-26 NOTE — PERIOP NOTE
TRANSFER - OUT REPORT:    Verbal report given to ROHINI Villegas on Mansi Love  being transferred to [facility] for routine progression of patient care       Report consisted of patient’s Situation, Background, Assessment and   Recommendations(SBAR).     Information from the following report(s) Nurse Handoff Report, Surgery Report, Cardiac Rhythm SR, and Neuro Assessment was reviewed with the receiving nurse.    Lines:   Peripheral IV 08/26/24 Left;Posterior Hand (Active)   Site Assessment Clean, dry & intact 08/26/24 0649   Line Status Blood return noted;Infusing 08/26/24 0649   Phlebitis Assessment No symptoms 08/26/24 0649   Infiltration Assessment 0 08/26/24 0649   Dressing Status Clean, dry & intact 08/26/24 0649        Opportunity for questions and clarification was provided.      Patient transported with:   O2 @ 2 liters    VTE prophylaxis orders have been written for Mansi Love.    Patient and family given floor number and nurses name.  Family updated re: pt status after security code verified.

## 2024-08-26 NOTE — PROGRESS NOTES
4 Eyes Skin Assessment     NAME:  Mansi Love  YOB: 1980  MEDICAL RECORD NUMBER:  [id number]    The patient is being assessed for  Admission    I agree that at least one RN has performed a thorough Head to Toe Skin Assessment on the patient. ALL assessment sites listed below have been assessed.      Areas assessed by both nurses:    Head, Face, Ears, Shoulders, Back, Chest, Arms, Elbows, Hands, Sacrum. Buttock, Coccyx, Ischium, and Legs. Feet and Heels        Does the Patient have a Wound? No noted wound(s)       Phuc Prevention initiated by RN: No  Wound Care Orders initiated by RN: No    Pressure Injury (Stage 3,4, Unstageable, DTI, NWPT, and Complex wounds) if present, place Wound referral order by RN under : No    New Ostomies, if present place, Ostomy referral order under : No     Nurse 1 eSignature: Electronically signed by BENJAMÍN ARGUELLO RN on 8/26/24 at 4:30 PM EDT    **SHARE this note so that the co-signing nurse can place an eSignature**    Nurse 2 eSignature: Electronically signed by Antoinette Rueda RN on 8/26/24 at 5:09 PM EDT

## 2024-08-26 NOTE — ANESTHESIA PRE PROCEDURE
Department of Anesthesiology  Preprocedure Note       Name:  Mansi Love   Age:  43 y.o.  :  1980                                          MRN:  [id number]         Date:  2024      Surgeon: Surgeon(s):  Cliff Torres MD    Procedure: Procedure(s):  C5-C6 Anterior Cervical Discectomy and Fusion with Interbody Spacer/ Allograft and Instrumentation    Medications prior to admission:   Prior to Admission medications    Medication Sig Start Date End Date Taking? Authorizing Provider   traMADol (ULTRAM) 50 MG tablet Take 1 tablet by mouth every 6 hours as needed for Pain for up to 7 days. Intended supply: 7 days. Take lowest dose possible to manage pain Max Daily Amount: 200 mg 24 Yes Cliff Torres MD   gabapentin (NEURONTIN) 100 MG capsule Take 1 capsule by mouth 3 times daily for 30 days. Take 1 tablet by mouth at night for 3 nights, then increase to BID morning and night for 3 days, you may taper up to three times a day.  Patient taking differently: Take 2 capsules by mouth 3 times daily. T 24 Yes Cliff Torres MD   Insulin Disposable Pump (OMNIPOD 5 G6 POD, GEN 5,) MISC Inject into the skin every other day 22  Yes ProviderAnika MD   NOVOLOG 100 UNIT/ML injection vial Insulin pump with Novolog insulin 22  Yes Anika Solitario MD   Insulin Degludec 200 UNIT/ML SOPN Inject into the skin   Yes Automatic Reconciliation, Ar   methocarbamol (ROBAXIN-750) 750 MG tablet Take 1 tablet by mouth every 6 hours as needed for spasms. 24   Cliff Torres MD   Continuous Blood Gluc Sensor (DEXCOM G4 SENSOR) MISC 1 Device by Other route continuous 3/2/22   ProviderAnika MD       Current medications:    Current Facility-Administered Medications   Medication Dose Route Frequency Provider Last Rate Last Admin   • fentaNYL (SUBLIMAZE) injection 100 mcg  100 mcg IntraVENous Once PRN Adriano Lepe MD       • scopolamine

## 2024-08-26 NOTE — PROGRESS NOTES
TRANSFER - IN REPORT:    Verbal report received from AprilROHINI on Mansi Love  being received from PACU for routine progression of patient care      Report consisted of patient's Situation, Background, Assessment and   Recommendations(SBAR).     Information from the following report(s) Nurse Handoff Report was reviewed with the receiving nurse.    Opportunity for questions and clarification was provided.      Assessment completed upon patient's arrival to unit and care assumed.

## 2024-08-27 VITALS
BODY MASS INDEX: 25.95 KG/M2 | HEART RATE: 83 BPM | WEIGHT: 152 LBS | TEMPERATURE: 98.8 F | RESPIRATION RATE: 16 BRPM | SYSTOLIC BLOOD PRESSURE: 110 MMHG | HEIGHT: 64 IN | OXYGEN SATURATION: 100 % | DIASTOLIC BLOOD PRESSURE: 86 MMHG

## 2024-08-27 LAB
ANION GAP SERPL CALC-SCNC: 10 MMOL/L (ref 9–18)
BUN SERPL-MCNC: 13 MG/DL (ref 6–23)
CALCIUM SERPL-MCNC: 8.5 MG/DL (ref 8.8–10.2)
CHLORIDE SERPL-SCNC: 99 MMOL/L (ref 98–107)
CO2 SERPL-SCNC: 26 MMOL/L (ref 20–28)
CREAT SERPL-MCNC: 0.76 MG/DL (ref 0.6–1.1)
GLUCOSE BLD STRIP.AUTO-MCNC: 165 MG/DL (ref 65–100)
GLUCOSE BLD STRIP.AUTO-MCNC: 319 MG/DL (ref 65–100)
GLUCOSE SERPL-MCNC: 176 MG/DL (ref 70–99)
POTASSIUM SERPL-SCNC: 4.1 MMOL/L (ref 3.5–5.1)
SERVICE CMNT-IMP: ABNORMAL
SERVICE CMNT-IMP: ABNORMAL
SODIUM SERPL-SCNC: 135 MMOL/L (ref 136–145)

## 2024-08-27 PROCEDURE — 97165 OT EVAL LOW COMPLEX 30 MIN: CPT

## 2024-08-27 PROCEDURE — 2500000003 HC RX 250 WO HCPCS: Performed by: ORTHOPAEDIC SURGERY

## 2024-08-27 PROCEDURE — 97535 SELF CARE MNGMENT TRAINING: CPT

## 2024-08-27 PROCEDURE — 2580000003 HC RX 258: Performed by: ORTHOPAEDIC SURGERY

## 2024-08-27 PROCEDURE — 97530 THERAPEUTIC ACTIVITIES: CPT

## 2024-08-27 PROCEDURE — 82962 GLUCOSE BLOOD TEST: CPT

## 2024-08-27 PROCEDURE — 36415 COLL VENOUS BLD VENIPUNCTURE: CPT

## 2024-08-27 PROCEDURE — 80048 BASIC METABOLIC PNL TOTAL CA: CPT

## 2024-08-27 PROCEDURE — 6370000000 HC RX 637 (ALT 250 FOR IP): Performed by: ORTHOPAEDIC SURGERY

## 2024-08-27 RX ORDER — HYDROMORPHONE HYDROCHLORIDE 2 MG/1
2 TABLET ORAL EVERY 4 HOURS PRN
Qty: 28 TABLET | Refills: 0 | Status: SHIPPED | OUTPATIENT
Start: 2024-08-27 | End: 2024-09-03

## 2024-08-27 RX ORDER — HYDROMORPHONE HYDROCHLORIDE 1 MG/ML
1 INJECTION, SOLUTION INTRAMUSCULAR; INTRAVENOUS; SUBCUTANEOUS ONCE
Status: DISCONTINUED | OUTPATIENT
Start: 2024-08-27 | End: 2024-08-27 | Stop reason: HOSPADM

## 2024-08-27 RX ORDER — HYDROMORPHONE HYDROCHLORIDE 1 MG/ML
1 INJECTION, SOLUTION INTRAMUSCULAR; INTRAVENOUS; SUBCUTANEOUS ONCE
Status: COMPLETED | OUTPATIENT
Start: 2024-08-27 | End: 2024-08-27

## 2024-08-27 RX ORDER — OXYCODONE HYDROCHLORIDE 5 MG/1
5 TABLET ORAL EVERY 4 HOURS PRN
Status: DISCONTINUED | OUTPATIENT
Start: 2024-08-27 | End: 2024-08-27 | Stop reason: HOSPADM

## 2024-08-27 RX ADMIN — DIPHENHYDRAMINE HYDROCHLORIDE 25 MG: 25 CAPSULE ORAL at 11:00

## 2024-08-27 RX ADMIN — OXYCODONE HYDROCHLORIDE 5 MG: 5 TABLET ORAL at 11:00

## 2024-08-27 RX ADMIN — METHOCARBAMOL TABLETS 750 MG: 750 TABLET, COATED ORAL at 10:27

## 2024-08-27 RX ADMIN — SODIUM CHLORIDE, PRESERVATIVE FREE 10 ML: 5 INJECTION INTRAVENOUS at 08:27

## 2024-08-27 RX ADMIN — HYDROMORPHONE HYDROCHLORIDE 0.5 MG: 1 INJECTION, SOLUTION INTRAMUSCULAR; INTRAVENOUS; SUBCUTANEOUS at 05:32

## 2024-08-27 RX ADMIN — HYDROMORPHONE HYDROCHLORIDE 0.5 MG: 1 INJECTION, SOLUTION INTRAMUSCULAR; INTRAVENOUS; SUBCUTANEOUS at 01:22

## 2024-08-27 RX ADMIN — TRAMADOL HYDROCHLORIDE 50 MG: 50 TABLET ORAL at 00:13

## 2024-08-27 RX ADMIN — SODIUM CHLORIDE: 9 INJECTION, SOLUTION INTRAVENOUS at 01:23

## 2024-08-27 RX ADMIN — METHOCARBAMOL TABLETS 750 MG: 750 TABLET, COATED ORAL at 00:13

## 2024-08-27 RX ADMIN — HYDROMORPHONE HYDROCHLORIDE 1 MG: 1 INJECTION, SOLUTION INTRAMUSCULAR; INTRAVENOUS; SUBCUTANEOUS at 08:31

## 2024-08-27 RX ADMIN — GABAPENTIN 100 MG: 100 CAPSULE ORAL at 08:24

## 2024-08-27 RX ADMIN — FAMOTIDINE 20 MG: 20 TABLET, FILM COATED ORAL at 08:24

## 2024-08-27 RX ADMIN — BISACODYL 5 MG: 5 TABLET, COATED ORAL at 08:24

## 2024-08-27 RX ADMIN — SODIUM CHLORIDE: 9 INJECTION, SOLUTION INTRAVENOUS at 10:55

## 2024-08-27 RX ADMIN — DOCUSATE SODIUM 50 MG AND SENNOSIDES 8.6 MG 1 TABLET: 8.6; 5 TABLET, FILM COATED ORAL at 08:24

## 2024-08-27 ASSESSMENT — PAIN DESCRIPTION - LOCATION
LOCATION: NECK
LOCATION: NECK;SHOULDER
LOCATION: NECK

## 2024-08-27 ASSESSMENT — PAIN DESCRIPTION - ONSET
ONSET: ON-GOING
ONSET: ON-GOING

## 2024-08-27 ASSESSMENT — PAIN SCALES - GENERAL
PAINLEVEL_OUTOF10: 10
PAINLEVEL_OUTOF10: 6
PAINLEVEL_OUTOF10: 2
PAINLEVEL_OUTOF10: 5
PAINLEVEL_OUTOF10: 6
PAINLEVEL_OUTOF10: 6
PAINLEVEL_OUTOF10: 9

## 2024-08-27 ASSESSMENT — PAIN DESCRIPTION - PAIN TYPE
TYPE: SURGICAL PAIN
TYPE: SURGICAL PAIN

## 2024-08-27 ASSESSMENT — PAIN DESCRIPTION - ORIENTATION
ORIENTATION: ANTERIOR
ORIENTATION: LEFT;ANTERIOR
ORIENTATION: ANTERIOR

## 2024-08-27 ASSESSMENT — PAIN - FUNCTIONAL ASSESSMENT
PAIN_FUNCTIONAL_ASSESSMENT: PREVENTS OR INTERFERES SOME ACTIVE ACTIVITIES AND ADLS
PAIN_FUNCTIONAL_ASSESSMENT: ACTIVITIES ARE NOT PREVENTED

## 2024-08-27 ASSESSMENT — PAIN DESCRIPTION - FREQUENCY
FREQUENCY: CONTINUOUS
FREQUENCY: CONTINUOUS

## 2024-08-27 ASSESSMENT — PAIN DESCRIPTION - DESCRIPTORS
DESCRIPTORS: TIGHTNESS;NAGGING
DESCRIPTORS: ACHING;THROBBING
DESCRIPTORS: ACHING;THROBBING
DESCRIPTORS: NAGGING;DISCOMFORT

## 2024-08-27 NOTE — PROGRESS NOTES
ORTHO PROGRESS NOTE    2024    Admit Date: 2024  Post Op day: 1 Day Post-Op      Subjective:     Mansi Love is a patient who is now 1 Day Post-Op  and has no complaints.       Objective:     PT/OT:    Doing well    Vital Signs:    Patient Vitals for the past 8 hrs:   BP Temp Temp src Pulse Resp SpO2   24 1100 -- -- -- -- 16 --   24 0831 -- -- -- -- 19 --   24 0724 110/86 98.8 °F (37.1 °C) Oral 83 19 100 %     Temp (24hrs), Av °F (36.7 °C), Min:97.2 °F (36.2 °C), Max:98.8 °F (37.1 °C)      LAB:    No results for input(s): \"HGB\", \"WBC\", \"PLT\" in the last 72 hours.    Physical Exam:    Awake and in no acute distress.  Mood and affect appropriate.  Respirations unlabored and no evidence cyanosis.  Calves nontender.  Abdomen soft and nontender.  Dressing clean/dry  No new neurologic deficit.    Assessment:      1 Day Post-Op STATUS POST Procedure(s):  C5-C6 Anterior Cervical Discectomy and Fusion with Interbody Spacer/ Allograft and Instrumentation      Plan:     Anticipate discharge to: HOME today      Signed By: KERRY CASILLAS MD

## 2024-08-27 NOTE — PROGRESS NOTES
ACUTE PHYSICAL THERAPY GOALS:   (Developed with and agreed upon by patient and/or caregiver.)  LTG:  (1.)Ms. Love will move from supine to sit and sit to supine , scoot up and down, and roll side to side in bed with INDEPENDENT within 7 treatment day(s).    (2.)Ms. Love will transfer from bed to chair and chair to bed with INDEPENDENT using the least restrictive device within 7 treatment day(s).    (3.)Ms. Love will ambulate with INDEPENDENT for 300+ feet with the least restrictive device within 7 treatment day(s).  (4.)Ms. Love will tolerate at least 23 min of dynamic standing activity to assist standing ADLs with the least restrictive device within 7 treatment days.    PHYSICAL THERAPY: Daily Note AM   (Link to Caseload Tracking: PT Visit Days : 2  Time In/Out PT Charge Capture  Rehab Caseload Tracker  Orders    Mansi Love is a 43 y.o. female   PRIMARY DIAGNOSIS: Herniation of cervical intervertebral disc with radiculopathy  Herniation of cervical intervertebral disc with radiculopathy [M50.10]  S/P cervical spinal fusion [Z98.1]  Procedure(s) (LRB):  C5-C6 Anterior Cervical Discectomy and Fusion with Interbody Spacer/ Allograft and Instrumentation (N/A)  1 Day Post-Op  Outpatient in a bed: Payor: [facility] / Plan: [facility] / Product Type: *No Product type* /     ASSESSMENT:     REHAB RECOMMENDATIONS:   Recommendation to date pending progress:  Setting:  Home Health Therapy    Equipment:    None     ASSESSMENT:  Ms. Love presents up in room on contact, just coming out of the bathroom.  She was able to ambulate about 500' using rolling walker and SBA.  She ambulates at a slow david with no LOB noted.  She returned to chair and reviewed spinal precautions and log rolling.  She also performed a few gentle shoulder shrugs and deep breathing.  She was left up in chair with needs in reach.  Good progress towards goals. Will continue with POC.     SUBJECTIVE:   Ms. Love states, \"I just feel  Decreased david  and Decreased step length    Weightbearing Status      Stairs      I=Independent, Mod I=Modified Independent, S=Supervision, SBA=Standby Assistance, CGA=Contact Guard Assistance,   Min=Minimal Assistance, Mod=Moderate Assistance, Max=Maximal Assistance, Total=Total Assistance, NT=Not Tested    PLAN:   FREQUENCY AND DURATION: BID for duration of hospital stay or until stated goals are met, whichever comes first.    TREATMENT:   TREATMENT:   Therapeutic Activity (24 Minutes): Therapeutic activity included Scooting, Ambulation on level ground, Sitting balance , and Standing balance to improve functional Activity tolerance, Balance, Mobility, and Strength.    TREATMENT GRID:  N/A    AFTER TREATMENT PRECAUTIONS: Call light within reach, Chair, Needs within reach, RN notified, and Visitors at bedside    INTERDISCIPLINARY COLLABORATION:  RN/ PCT and PT/ PTA    EDUCATION:      TIME IN/OUT:  Time In: 1000  Time Out: 1024  Minutes: 24    SARAH MORATAYA PTA

## 2024-08-27 NOTE — CARE COORDINATION
CM screened chart for potential transitions of care needs.  Patient admitted for elective back surgery with Dr. Torres.  Therapy is recommending HH and no DME at discharge.  Referral was sent by surgeon's office to [facility] prior to admission.  HH information was added to patient's chart.  Patient is insured and current with PCP.      No transitions of care needs noted at this time.    Please consult CM for any additional noted NATHALIE needs.       08/27/24 1113   Service Assessment   Patient Orientation Alert and Oriented   Cognition Alert   History Provided By Medical Record   Primary Caregiver Self   Accompanied By/Relationship N/A   Support Systems Friends/Neighbors;Family Members   Patient's Healthcare Decision Maker is: Legal Next of Kin   PCP Verified by CM Yes   Last Visit to PCP Within last 3 months  (6/27/2024)   Prior Functional Level Independent in ADLs/IADLs   Current Functional Level Assistance with the following:;Bathing;Cooking;Housework;Shopping;Mobility   Can patient return to prior living arrangement Yes   Ability to make needs known: Good   Family able to assist with home care needs: Yes   Would you like for me to discuss the discharge plan with any other family members/significant others, and if so, who? No   Financial Resources Other (Comment)  ([employer])   Community Resources None   CM/SW Referral Other (see comment)  (No CM consult)   Discharge Planning   Potential Assistance Needed Home Care   Type of Home Care Services PT;OT   Services At/After Discharge   Transition of Care Consult (CM Consult) Home Health   Internal Home Health No   Reason Outside Agency Chosen Physician referred to specific agency  ([facility])   Services At/After Discharge PT;OT   Condition of Participation: Discharge Planning   The Plan for Transition of Care is related to the following treatment goals: Patient will discharge home with home health when medically ready.

## 2024-08-27 NOTE — THERAPY EVALUATION
COGNITION/  PERCEPTION: INTACT IMPAIRED   (See Comments)   Orientation [x]     Vision [x]     Hearing [x]     Cognition  [x]     Perception [x]       MOBILITY: I Mod I S SBA CGA Min Mod Max Total  NT x2 Comments:   Bed Mobility    Rolling [] [] [] [] [] [] [] [] [] [x] []    Supine to Sit [] [] [] [] [] [] [] [] [] [x] [] Sitting up in chair upon arrival   Scooting [] [] [] [] [] [] [] [] [] [x] []    Sit to Supine [] [] [] [] [] [] [] [] [] [x] []    Transfers    Sit to Stand [] [] [] [x] [] [] [] [] [] [] []    Bed to Chair [] [] [] [] [] [] [] [] [] [x] []    Stand to Sit [] [] [] [x] [] [] [] [] [] [] []    Tub/Shower [] [] [] [] [] [] [] [] [] [x] []     Toilet [] [] [] [x] [] [] [] [] [] [] []      [] [] [] [] [] [] [] [] [] [] []    I=Independent, Mod I=Modified Independent, S=Supervision/Setup, SBA=Standby Assistance, CGA=Contact Guard Assistance, Min=Minimal Assistance, Mod=Moderate Assistance, Max=Maximal Assistance, Total=Total Assistance, NT=Not Tested    ACTIVITIES OF DAILY LIVING: I Mod I S SBA CGA Min Mod Max Total NT Comments   BASIC ADLs:              Upper Body Bathing  [] [] [] [] [] [] [] [] [] []     Lower Body Bathing [] [] [] [] [] [] [] [] [] []     Toileting [] [] [] [] [] [] [] [] [] []    Upper Body Dressing [] [] [] [] [] [] [] [] [] []    Lower Body Dressing [x] [] [] [] [] [] [] [] [] [] Sitting, don/doff socks   Feeding [] [] [] [] [] [] [] [] [] []    Grooming [] [] [] [x] [] [] [] [] [] [] Standing edge of sink, hand hygiene   Personal Device Care [] [] [] [] [] [] [] [] [] []    Functional Mobility [] [] [] [x] [] [] [] [] [] [] No AD   I=Independent, Mod I=Modified Independent, S=Supervision/Setup, SBA=Standby Assistance, CGA=Contact Guard Assistance, Min=Minimal Assistance, Mod=Moderate Assistance, Max=Maximal Assistance, Total=Total Assistance, NT=Not Tested    PLAN:   FREQUENCY/DURATION   OT Plan of Care: 3 times/week for duration of hospital stay or until stated goals are  met, whichever comes first.    PROBLEM LIST:   (Skilled intervention is medically necessary to address:)  Decreased ADL/Functional Activities  Decreased Activity Tolerance  Decreased Balance  Decreased Gait Ability  Decreased Safety Awareness  Decreased Strength  Decreased Transfer Abilities  Increased Pain   INTERVENTIONS PLANNED:  (Benefits and precautions of occupational therapy have been discussed with the patient.)  Self Care Training  Therapeutic Activity  Therapeutic Exercise/HEP  Neuromuscular Re-education  Manual Therapy  Education         TREATMENT:     EVALUATION: LOW COMPLEXITY: (Untimed Charge)  The initial evaluation charge encompasses clinical chart review, objective assessment, interpretation of assessment, and skilled monitoring of the patient's response to treatment in order to develop a plan of care.     TREATMENT:   Self Care (8 minutes): Patient participated in lower body dressing and grooming ADLs in unsupported sitting and standing with minimal visual and verbal cueing to increase independence, increase activity tolerance, increase safety awareness, and maintain precautions. Patient also participated in functional mobility, functional transfer, and energy conservation training to increase independence, increase activity tolerance, increase safety awareness, and maintain precautions.     TREATMENT GRID:  N/A    AFTER TREATMENT PRECAUTIONS: Bed/Chair Locked, Call light within reach, Chair, Needs within reach, and RN notified    INTERDISCIPLINARY COLLABORATION:  RN/ PCT and OT/ WADDELL    EDUCATION:  Education Given To: Patient  Education Provided: Role of Therapy;Plan of Care;Precautions;Transfer Training;ADL Adaptive Strategies  Education Method: Verbal;Demonstration  Education Outcome: Verbalized understanding;Demonstrated understanding    OT educated patient  on spinal precautions, relaxation techniques, and proper body mechanics. Patient verbalized and demonstrated understanding. May need

## 2024-08-27 NOTE — PLAN OF CARE
Problem: Chronic Conditions and Co-morbidities  Goal: Patient's chronic conditions and co-morbidity symptoms are monitored and maintained or improved  8/27/2024 1145 by Lazara Jimenez RN  Outcome: Adequate for Discharge  8/26/2024 2358 by Gorge Jacques RN  Outcome: Progressing  8/26/2024 2357 by Gorge Jacques RN  Outcome: Progressing     Problem: Pain  Goal: Verbalizes/displays adequate comfort level or baseline comfort level  8/27/2024 1145 by Lazara Jimenez RN  Outcome: Adequate for Discharge  8/26/2024 2358 by Gorge Jacques RN  Outcome: Progressing  8/26/2024 2357 by Gorge Jacques RN  Outcome: Progressing     Problem: Safety - Adult  Goal: Free from fall injury  8/27/2024 1145 by Lazara Jimenez RN  Outcome: Adequate for Discharge  8/26/2024 2358 by Gorge Jacques RN  Outcome: Progressing  8/26/2024 2357 by Gorge Jacques RN  Outcome: Progressing     Problem: Discharge Planning  Goal: Discharge to home or other facility with appropriate resources  8/27/2024 1145 by Lazara Jimenez RN  Outcome: Adequate for Discharge  8/26/2024 2358 by Gorge Jacques RN  Outcome: Progressing  8/26/2024 2357 by Gorge Jacques RN  Outcome: Progressing     Problem: ABCDS Injury Assessment  Goal: Absence of physical injury  8/27/2024 1145 by Lazara Jimenez RN  Outcome: Adequate for Discharge  8/26/2024 2358 by Gorge Jacques RN  Outcome: Progressing  8/26/2024 2357 by Gorge Jacques RN  Outcome: Progressing

## 2024-08-29 ENCOUNTER — TELEPHONE (OUTPATIENT)
Dept: ORTHOPEDIC SURGERY | Age: 44
End: 2024-08-29

## 2024-08-29 ENCOUNTER — HOSPITAL ENCOUNTER (EMERGENCY)
Age: 44
Discharge: HOME | End: 2024-08-29
Attending: EMERGENCY MEDICINE
Payer: COMMERCIAL

## 2024-08-29 ENCOUNTER — CLINICAL DOCUMENTATION (OUTPATIENT)
Age: 44
End: 2024-08-29

## 2024-08-29 ENCOUNTER — APPOINTMENT (OUTPATIENT)
Dept: CT IMAGING | Age: 44
End: 2024-08-29
Payer: COMMERCIAL

## 2024-08-29 VITALS
TEMPERATURE: 98 F | OXYGEN SATURATION: 100 % | WEIGHT: 152 LBS | SYSTOLIC BLOOD PRESSURE: 152 MMHG | DIASTOLIC BLOOD PRESSURE: 77 MMHG | HEART RATE: 62 BPM | HEIGHT: 64 IN | BODY MASS INDEX: 25.95 KG/M2 | RESPIRATION RATE: 16 BRPM

## 2024-08-29 DIAGNOSIS — G89.18 POST-OP PAIN: Primary | ICD-10-CM

## 2024-08-29 LAB
ALBUMIN SERPL-MCNC: 3.8 G/DL (ref 3.5–5)
ALBUMIN/GLOB SERPL: 1.4 (ref 1–1.9)
ALP SERPL-CCNC: 54 U/L (ref 35–104)
ALT SERPL-CCNC: 12 U/L (ref 12–65)
ANION GAP SERPL CALC-SCNC: 11 MMOL/L (ref 9–18)
AST SERPL-CCNC: 16 U/L (ref 15–37)
BASOPHILS # BLD: 0 K/UL (ref 0–0.2)
BASOPHILS NFR BLD: 1 % (ref 0–2)
BILIRUB SERPL-MCNC: 0.9 MG/DL (ref 0–1.2)
BUN SERPL-MCNC: 5 MG/DL (ref 6–23)
CALCIUM SERPL-MCNC: 9.1 MG/DL (ref 8.8–10.2)
CHLORIDE SERPL-SCNC: 99 MMOL/L (ref 98–107)
CO2 SERPL-SCNC: 26 MMOL/L (ref 20–28)
CREAT SERPL-MCNC: 0.66 MG/DL (ref 0.6–1.1)
DIFFERENTIAL METHOD BLD: ABNORMAL
EOSINOPHIL # BLD: 0.2 K/UL (ref 0–0.8)
EOSINOPHIL NFR BLD: 3 % (ref 0.5–7.8)
ERYTHROCYTE [DISTWIDTH] IN BLOOD BY AUTOMATED COUNT: 11.6 % (ref 11.9–14.6)
GLOBULIN SER CALC-MCNC: 2.8 G/DL (ref 2.3–3.5)
GLUCOSE SERPL-MCNC: 240 MG/DL (ref 70–99)
HCT VFR BLD AUTO: 37.2 % (ref 35.8–46.3)
HGB BLD-MCNC: 12.4 G/DL (ref 11.7–15.4)
IMM GRANULOCYTES # BLD AUTO: 0 K/UL (ref 0–0.5)
IMM GRANULOCYTES NFR BLD AUTO: 0 % (ref 0–5)
LYMPHOCYTES # BLD: 1.2 K/UL (ref 0.5–4.6)
LYMPHOCYTES NFR BLD: 20 % (ref 13–44)
MCH RBC QN AUTO: 31.2 PG (ref 26.1–32.9)
MCHC RBC AUTO-ENTMCNC: 33.3 G/DL (ref 31.4–35)
MCV RBC AUTO: 93.5 FL (ref 82–102)
MONOCYTES # BLD: 0.8 K/UL (ref 0.1–1.3)
MONOCYTES NFR BLD: 14 % (ref 4–12)
NEUTS SEG # BLD: 3.8 K/UL (ref 1.7–8.2)
NEUTS SEG NFR BLD: 62 % (ref 43–78)
NRBC # BLD: 0 K/UL (ref 0–0.2)
PLATELET # BLD AUTO: 199 K/UL (ref 150–450)
PMV BLD AUTO: 9.7 FL (ref 9.4–12.3)
POTASSIUM SERPL-SCNC: 4.1 MMOL/L (ref 3.5–5.1)
PROT SERPL-MCNC: 6.6 G/DL (ref 6.3–8.2)
RBC # BLD AUTO: 3.98 M/UL (ref 4.05–5.2)
SODIUM SERPL-SCNC: 136 MMOL/L (ref 136–145)
WBC # BLD AUTO: 6 K/UL (ref 4.3–11.1)

## 2024-08-29 PROCEDURE — 99285 EMERGENCY DEPT VISIT HI MDM: CPT

## 2024-08-29 PROCEDURE — 6360000002 HC RX W HCPCS: Performed by: STUDENT IN AN ORGANIZED HEALTH CARE EDUCATION/TRAINING PROGRAM

## 2024-08-29 PROCEDURE — 80053 COMPREHEN METABOLIC PANEL: CPT

## 2024-08-29 PROCEDURE — 2580000003 HC RX 258: Performed by: STUDENT IN AN ORGANIZED HEALTH CARE EDUCATION/TRAINING PROGRAM

## 2024-08-29 PROCEDURE — 6360000004 HC RX CONTRAST MEDICATION: Performed by: STUDENT IN AN ORGANIZED HEALTH CARE EDUCATION/TRAINING PROGRAM

## 2024-08-29 PROCEDURE — 70491 CT SOFT TISSUE NECK W/DYE: CPT

## 2024-08-29 PROCEDURE — 96375 TX/PRO/DX INJ NEW DRUG ADDON: CPT

## 2024-08-29 PROCEDURE — 85025 COMPLETE CBC W/AUTO DIFF WBC: CPT

## 2024-08-29 PROCEDURE — 96374 THER/PROPH/DIAG INJ IV PUSH: CPT

## 2024-08-29 RX ORDER — METHYLPREDNISOLONE 4 MG
TABLET, DOSE PACK ORAL
Qty: 1 KIT | Refills: 0 | Status: SHIPPED | OUTPATIENT
Start: 2024-08-29 | End: 2024-09-04

## 2024-08-29 RX ORDER — ONDANSETRON 2 MG/ML
4 INJECTION INTRAMUSCULAR; INTRAVENOUS
Status: COMPLETED | OUTPATIENT
Start: 2024-08-29 | End: 2024-08-29

## 2024-08-29 RX ORDER — IOPAMIDOL 755 MG/ML
80 INJECTION, SOLUTION INTRAVASCULAR
Status: COMPLETED | OUTPATIENT
Start: 2024-08-29 | End: 2024-08-29

## 2024-08-29 RX ORDER — 0.9 % SODIUM CHLORIDE 0.9 %
500 INTRAVENOUS SOLUTION INTRAVENOUS
Status: COMPLETED | OUTPATIENT
Start: 2024-08-29 | End: 2024-08-29

## 2024-08-29 RX ORDER — HYDROCODONE BITARTRATE AND ACETAMINOPHEN 7.5; 325 MG/15ML; MG/15ML
15 SOLUTION ORAL 4 TIMES DAILY PRN
Qty: 100 ML | Refills: 0 | Status: SHIPPED | OUTPATIENT
Start: 2024-08-29 | End: 2024-09-01

## 2024-08-29 RX ORDER — MORPHINE SULFATE 4 MG/ML
4 INJECTION, SOLUTION INTRAMUSCULAR; INTRAVENOUS ONCE
Status: COMPLETED | OUTPATIENT
Start: 2024-08-29 | End: 2024-08-29

## 2024-08-29 RX ADMIN — ONDANSETRON 4 MG: 2 INJECTION INTRAMUSCULAR; INTRAVENOUS at 10:20

## 2024-08-29 RX ADMIN — MORPHINE SULFATE 4 MG: 4 INJECTION INTRAVENOUS at 10:20

## 2024-08-29 RX ADMIN — SODIUM CHLORIDE 500 ML: 9 INJECTION, SOLUTION INTRAVENOUS at 12:40

## 2024-08-29 RX ADMIN — FENTANYL CITRATE 50 MCG: 50 INJECTION INTRAMUSCULAR; INTRAVENOUS at 12:39

## 2024-08-29 RX ADMIN — IOPAMIDOL 80 ML: 755 INJECTION, SOLUTION INTRAVENOUS at 11:14

## 2024-08-29 ASSESSMENT — PAIN SCALES - GENERAL
PAINLEVEL_OUTOF10: 10
PAINLEVEL_OUTOF10: 10

## 2024-08-29 ASSESSMENT — ENCOUNTER SYMPTOMS
SHORTNESS OF BREATH: 0
FACIAL SWELLING: 0
PHOTOPHOBIA: 0
NAUSEA: 1
COUGH: 0
ABDOMINAL PAIN: 0
VOMITING: 1
TROUBLE SWALLOWING: 1

## 2024-08-29 ASSESSMENT — LIFESTYLE VARIABLES
HOW MANY STANDARD DRINKS CONTAINING ALCOHOL DO YOU HAVE ON A TYPICAL DAY: PATIENT DOES NOT DRINK
HOW OFTEN DO YOU HAVE A DRINK CONTAINING ALCOHOL: NEVER

## 2024-08-29 NOTE — ED NOTES
Assumed care of pt at this time. Pt c/o difficulty swallowing and pain r/t spinal surgery performed 8/26. Pt denies difficulty breathing     Rosalba Phillips RN  08/29/24 7017

## 2024-08-29 NOTE — PROGRESS NOTES
Patient seen and evaluated.  Patient is status post C5-6 ACDF that was done on 8/26/2024.  She was doing okay overall until yesterday when she began to have some difficulty with swallowing her pills.  She denies any issues with breathing.  She is able to talk in full sentences.  She is not toxic appearing.  She has no new neurologic deficit.    CT scan of the neck shows postsurgical changes.  I do not see any large hematoma.    Plan:    -I discussed with the patient that she likely has dysphagia from soft tissue swelling which is very common after anterior cervical surgery.  She does have type 1 diabetes but states that she has been able to tolerate steroid Dosepaks in the past.  I recommend that she be discharged with a steroid Dosepak.  -Given that she is having difficulty with her pain medicine pills I recommend that she be discharged with a 3-day prescription for liquid Norco  -She may continue her regularly scheduled follow-up in the office    Cliff Torres MD  Orthopaedic Spine Surgery

## 2024-08-29 NOTE — TELEPHONE ENCOUNTER
She saw Dr. Torres in the ER today. They wrote her a RX for a steroid and a liquid pain medicine. The pharmacy told her it was written wrong. She wants to know if Dr. Torres can write her a RX so she don't have to go back to the ER to get a new RX. She took the RX to [facility]. Please let her know.

## 2024-08-29 NOTE — ED TRIAGE NOTES
Pt had spinal surgery on Monday for a herniated disc.  Incision was through her front of her neck. Pt states she is unable to swallow pain medications and is having tingling in rt hand. Denies trouble breathing. Site is clean and intact. No redness or bleeding around the site.

## 2024-08-29 NOTE — DISCHARGE INSTRUCTIONS
You may use the liquid Norco to help ease your pain.  Take the full course of steroids.  Return here for new or worsening symptoms.  Otherwise please follow-up with your surgeon    Risk of opioid analgesics include, but are not limited to: Overdosing can stop or slow your breathing and lead to death; fractures from falls; drowsiness leading to injury; tolerance, dependence and addiction. You should not operate any motorized vehicles or work from a height greater than ground level when taking opioid analgesics as they increase your fall risks. Do not combine these medications with any other opioid, to include street opioids such as heroin. Do not combine these medications with benzodiazepines like Xanax or alcohol as this may cause severe respiratory depression and death.    As we discussed, I did not find a life threatening cause of your symptoms today. However, THAT DOES NOT MEAN IT COULD NOT DEVELOP. If you develop ANY new or worsening symptoms, it is critical that you return for re-evaluation. This includes any symptoms that are concerning to you, especially symptoms such as inability to tolerate oral secretions, fevers, severe pain.  If you do not return for re-evaluation, you risk serious complications, including death.

## 2024-08-29 NOTE — ED NOTES
Patient mobility status  with no difficulty. Provider aware     I have reviewed discharge instructions with the caregiver.  The caregiver verbalized understanding.    Patient left ED via Discharge Method: ambulatory to Home with Guardian.    Opportunity for questions and clarification provided.     Patient given 2 scripts.           Denzel Moore RN  08/29/24 5559

## 2024-08-29 NOTE — PROGRESS NOTES
Patient had C5-6 ACDF with allograft and instrumentation on 8/26/24; was discharged on Tuesday. Patient came be the office this morning stating she feels her throat is swelling and she feels a pill in stuck in her throat right now. Patient was advised to go to the ER to be evaluated.

## 2024-08-29 NOTE — ED PROVIDER NOTES
Emergency Department Provider Note       PCP: Zion Multani MD   Age: 43 y.o.   Sex: female     DISPOSITION Decision To Discharge 08/29/2024 12:44:02 PM  Condition at Disposition: Good       ICD-10-CM    1. Post-op pain  G89.18 HYDROcodone-acetaminophen 2.5-108 mg/5 mL solution          Medical Decision Making     In summary this is a 43-year-old female patient who presented for evaluation of pain with swallowing after cervical surgery.  She was able to tolerate oral fluids.  Her vitals were stable.  She did not have any significant swelling.  I have low suspicion for infectious pathology at this time.  Her lab work is reassuring overall.  Mild hyperglycemia.  We did get a CT scan of her neck which did show gas likely secondary to the surgery.  Discussed this with the patient's surgeon who also evaluated the patient and is in agreement.  We will place the patient on liquid Norco and a short course of steroids to help reduce the swelling.  She will follow-up with patient's surgeon in the office.  Counseled on signs to return for.  Patient has verbalized understanding and agreed to the plan.  Discharged in stable condition.  ED Course as of 08/29/24 1707   Thu Aug 29, 2024   1011 10:11 AM  Consulted orthopedics on-call MARTIR Chan to discuss patient's presence in the emergency department as this is a patient of [facility].  Patient is able to swallow fluids and I had her swallow water to confirm.  She states she has trouble swallowing pills at this time. [NR]   1223 12:23 PM  Consulted Dr. Torres to discuss CT findings.  He would like her to go home with liquid Norco as well as steroids.  He does not think this patient has infection and thinks the gas seen on CT is secondary to the surgery.  He did personally evaluate the patient today in the emergency department. [NR]      ED Course User Index  [NR] Chris Borrego PA     1 acute complicated illness or injury.  Prescription drug

## 2024-09-06 ENCOUNTER — TELEPHONE (OUTPATIENT)
Dept: ORTHOPEDIC SURGERY | Age: 44
End: 2024-09-06

## 2024-09-10 ENCOUNTER — OFFICE VISIT (OUTPATIENT)
Age: 44
End: 2024-09-10

## 2024-09-10 DIAGNOSIS — Z98.1 S/P CERVICAL SPINAL FUSION: Primary | ICD-10-CM

## 2024-09-10 PROCEDURE — 99024 POSTOP FOLLOW-UP VISIT: CPT | Performed by: ORTHOPAEDIC SURGERY

## 2024-10-09 ENCOUNTER — OFFICE VISIT (OUTPATIENT)
Age: 44
End: 2024-10-09

## 2024-10-09 DIAGNOSIS — Z98.1 S/P CERVICAL SPINAL FUSION: Primary | ICD-10-CM

## 2024-10-09 PROCEDURE — 99024 POSTOP FOLLOW-UP VISIT: CPT | Performed by: ORTHOPAEDIC SURGERY

## 2024-10-09 NOTE — PROGRESS NOTES
Name: Mansi Love  YOB: 1980  Gender: female  MRN: [id number]  Age: 44 y.o.    Chief Complaint: 6-week postoperative visit  History of present illness:    This is a very pleasant 44 y.o. female who presents with history of a C5-6 ACDF that was done 6 weeks ago.  Doing well overall.  Her arm pain is gone.      Medications:     Prior to Visit Medications    Medication Sig Taking? Authorizing Provider   gabapentin (NEURONTIN) 100 MG capsule Take 1 capsule by mouth 3 times daily for 30 days. Take 1 tablet by mouth at night for 3 nights, then increase to BID morning and night for 3 days, you may taper up to three times a day.  Cliff Torres MD   methocarbamol (ROBAXIN-750) 750 MG tablet Take 1 tablet by mouth every 6 hours as needed for spasms.  Cliff Torres MD   Insulin Disposable Pump (OMNIPOD 5 G6 POD, GEN 5,) MISC Inject into the skin every other day  ProviderAnika MD   NOVOLOG 100 UNIT/ML injection vial Insulin pump with Novolog insulin  ProviderAnika MD   Continuous Blood Gluc Sensor (DEXCOM G4 SENSOR) MISC 1 Device by Other route continuous  Anika Solitario MD   Insulin Degludec 200 UNIT/ML SOPN Inject into the skin  Patient not taking: Reported on 8/26/2024  Automatic Reconciliation, Ar       Allergies:     Allergies   Allergen Reactions    Sulfamethoxazole-Trimethoprim Anaphylaxis and Rash    Peppermint Oil Hives    Oxycodone-Acetaminophen Hives     Hives.    Allergic to oxycodone not Tylenol    Sulfa Antibiotics Hives    Tramadol Rash     Pt denies allergy        Physical Exam:     This is a well developed well nourished adult female in no acute distress.     Oriented to person, place and time.    Mood and affect are appropriate.    Respirations are unlabored and there is no evidence of cyanosis.    Pulses in the upper extremities are palpable and equal      Sensory testing:       C5   C6   C7   C8    T1   Right 2 2 2 2 2   Left 2 2 2 2 2     0=absent;  Medications/Imaging Studies

## 2024-10-29 DIAGNOSIS — Z98.1 S/P CERVICAL SPINAL FUSION: Primary | ICD-10-CM

## 2024-10-29 RX ORDER — HYDROCODONE BITARTRATE AND ACETAMINOPHEN 5; 325 MG/1; MG/1
1 TABLET ORAL EVERY 4 HOURS PRN
Qty: 18 TABLET | Refills: 0 | Status: SHIPPED | OUTPATIENT
Start: 2024-10-29 | End: 2024-11-01

## 2024-10-29 NOTE — TELEPHONE ENCOUNTER
Returned patients call and response to my chart.    Per patient states she had swelling in front and back of neck; states she  doesn't have any issues swallowing. Denies fever, nausea. Patient states she has pain in left upper extremity and numbness in hand this weekend. States she took Ibuprofen  and it subsided. Patient c/o unable to sleep due to neck pain as well.    Discussed with DAYRONW states he will prescribe [place]. Pharmacy verified. Prescription sent to DAYRONW to review and send to pharmacy.

## 2024-10-29 NOTE — TELEPHONE ENCOUNTER
Post op surg 8/26 was doing good til now having pain/swelling,patient says also sent a mychart message

## 2024-11-20 ENCOUNTER — OFFICE VISIT (OUTPATIENT)
Age: 44
End: 2024-11-20

## 2024-11-20 DIAGNOSIS — Z98.1 S/P CERVICAL SPINAL FUSION: Primary | ICD-10-CM

## 2024-11-20 DIAGNOSIS — M54.12 CERVICAL RADICULOPATHY: ICD-10-CM

## 2024-11-20 PROCEDURE — 99024 POSTOP FOLLOW-UP VISIT: CPT | Performed by: ORTHOPAEDIC SURGERY

## 2024-11-20 RX ORDER — CYCLOBENZAPRINE HCL 5 MG
5 TABLET ORAL NIGHTLY
Qty: 30 TABLET | Refills: 0 | Status: SHIPPED | OUTPATIENT
Start: 2024-11-20 | End: 2024-12-20

## 2024-11-20 NOTE — PROGRESS NOTES
Name: Mansi Love  YOB: 1980  Gender: female  MRN: [id number]  Age: 44 y.o.    Chief Complaint: Postoperative follow-up  History of present illness:    This is a very pleasant 44 y.o. female who presents with history of a C5-6 ACDF done on 8/26/2024.  Overall doing well.  She does have difficulty sleeping secondary to not being able to get comfortable.  The arm pain is gone.        Medications:     Prior to Visit Medications    Medication Sig Taking? Authorizing Provider   cyclobenzaprine (FLEXERIL) 5 MG tablet Take 1 tablet by mouth at bedtime Yes Cliff Torres MD   gabapentin (NEURONTIN) 100 MG capsule Take 1 capsule by mouth 3 times daily for 30 days. Take 1 tablet by mouth at night for 3 nights, then increase to BID morning and night for 3 days, you may taper up to three times a day.  Cliff Torres MD   Insulin Disposable Pump (OMNIPOD 5 G6 POD, GEN 5,) MISC Inject into the skin every other day  ProviderAnika MD   NOVOLOG 100 UNIT/ML injection vial Insulin pump with Novolog insulin  ProviderAnika MD   Continuous Blood Gluc Sensor (DEXCOM G4 SENSOR) MISC 1 Device by Other route continuous  Anika Solitario MD   Insulin Degludec 200 UNIT/ML SOPN Inject into the skin  Patient not taking: Reported on 8/26/2024  Automatic Reconciliation, Ar       Allergies:     Allergies   Allergen Reactions    Sulfamethoxazole-Trimethoprim Anaphylaxis and Rash    Peppermint Oil Hives    Oxycodone-Acetaminophen Hives     Hives.    Allergic to oxycodone not Tylenol    Seasonal     Sulfa Antibiotics Hives    Tramadol Rash     Pt denies allergy        Physical Exam:     This is a well developed well nourished adult female in no acute distress.     Oriented to person, place and time.    Mood and affect are appropriate.    Respirations are unlabored and there is no evidence of cyanosis.    Pulses in the upper extremities are palpable and equal      Sensory testing:       C5   C6

## 2024-12-27 RX ORDER — CYCLOBENZAPRINE HCL 5 MG
5 TABLET ORAL
Qty: 30 TABLET | Refills: 0 | OUTPATIENT
Start: 2024-12-27

## 2024-12-27 RX ORDER — DOCUSATE SODIUM 100 MG/1
100 CAPSULE, LIQUID FILLED ORAL 2 TIMES DAILY
Qty: 60 CAPSULE | Refills: 0 | OUTPATIENT
Start: 2024-12-27

## 2025-01-30 ENCOUNTER — HOSPITAL ENCOUNTER (EMERGENCY)
Age: 45
Discharge: HOME | End: 2025-01-30
Attending: EMERGENCY MEDICINE
Payer: COMMERCIAL

## 2025-01-30 VITALS
OXYGEN SATURATION: 100 % | WEIGHT: 157 LBS | HEIGHT: 64 IN | SYSTOLIC BLOOD PRESSURE: 138 MMHG | BODY MASS INDEX: 26.8 KG/M2 | DIASTOLIC BLOOD PRESSURE: 79 MMHG | HEART RATE: 62 BPM | TEMPERATURE: 98.5 F | RESPIRATION RATE: 14 BRPM

## 2025-01-30 DIAGNOSIS — M79.671 RIGHT FOOT PAIN: Primary | ICD-10-CM

## 2025-01-30 PROCEDURE — 99283 EMERGENCY DEPT VISIT LOW MDM: CPT

## 2025-01-30 RX ORDER — KETOROLAC TROMETHAMINE 10 MG/1
10 TABLET, FILM COATED ORAL EVERY 6 HOURS PRN
Qty: 20 TABLET | Refills: 0 | Status: SHIPPED | OUTPATIENT
Start: 2025-01-30

## 2025-01-30 ASSESSMENT — LIFESTYLE VARIABLES
HOW OFTEN DO YOU HAVE A DRINK CONTAINING ALCOHOL: NEVER
HOW MANY STANDARD DRINKS CONTAINING ALCOHOL DO YOU HAVE ON A TYPICAL DAY: PATIENT DOES NOT DRINK

## 2025-01-30 ASSESSMENT — PAIN SCALES - GENERAL
PAINLEVEL_OUTOF10: 9
PAINLEVEL_OUTOF10: 9

## 2025-01-30 ASSESSMENT — PAIN DESCRIPTION - ORIENTATION: ORIENTATION: LEFT

## 2025-01-30 ASSESSMENT — PAIN - FUNCTIONAL ASSESSMENT: PAIN_FUNCTIONAL_ASSESSMENT: 0-10

## 2025-01-30 ASSESSMENT — PAIN DESCRIPTION - LOCATION: LOCATION: FOOT

## 2025-01-30 NOTE — ED TRIAGE NOTES
Patient advises that she has cyst that develop to bottom of her feet. Patient advises that podiatry doctor was unable to see her. Patient advises that she has had to have them drained in past. Patient advises history of diabetes and was concerned. No drainage at this time.

## 2025-01-30 NOTE — ED PROVIDER NOTES
NOVOLOG 100 UNIT/ML INJECTION VIAL    Insulin pump with Novolog insulin        Results from this emergency department visit:      No results found for any visits on 01/30/25.      No orders to display                No results for input(s): \"COVID19\" in the last 72 hours.     Voice dictation software was used during the making of this note.  This software is not perfect and grammatical and other typographical errors may be present.  This note has not been completely proofread for errors.       Quintin Sanchez MD  01/30/25 1244       Quintin Sanchez MD  01/30/25 1244

## 2025-02-05 ENCOUNTER — OFFICE VISIT (OUTPATIENT)
Age: 45
End: 2025-02-05
Payer: COMMERCIAL

## 2025-02-05 DIAGNOSIS — R20.2 NUMBNESS AND TINGLING OF BOTH UPPER EXTREMITIES: ICD-10-CM

## 2025-02-05 DIAGNOSIS — R20.0 NUMBNESS AND TINGLING OF BOTH UPPER EXTREMITIES: ICD-10-CM

## 2025-02-05 DIAGNOSIS — Z98.1 S/P CERVICAL SPINAL FUSION: Primary | ICD-10-CM

## 2025-02-05 PROCEDURE — 99213 OFFICE O/P EST LOW 20 MIN: CPT | Performed by: ORTHOPAEDIC SURGERY

## 2025-02-05 NOTE — PROGRESS NOTES
Name: Mansi Love  YOB: 1980  Gender: female  MRN: [id number]  Age: 44 y.o.    Chief Complaint: Postop follow-up  History of present illness:    This is a very pleasant 44 y.o. female who presents with history of a C5-6 ACDF done on 8/26/2024.  This was done for right arm pain that is now gone.  She states that over the past month she has been having trouble sleeping.  She has numbness and tingling in both of her arms when she is asleep that wakes her up from her sleep.  Denies any pain.  Medications:     Prior to Visit Medications    Medication Sig Taking? Authorizing Provider   ketorolac (TORADOL) 10 MG tablet Take 1 tablet by mouth every 6 hours as needed for Pain  Quintin Sanchez MD   gabapentin (NEURONTIN) 100 MG capsule Take 1 capsule by mouth 3 times daily for 30 days. Take 1 tablet by mouth at night for 3 nights, then increase to BID morning and night for 3 days, you may taper up to three times a day.  Cliff Torres MD   Insulin Disposable Pump (OMNIPOD 5 G6 POD, GEN 5,) MISC Inject into the skin every other day  Anika Solitario MD   NOVOLOG 100 UNIT/ML injection vial Insulin pump with Novolog insulin  ProviderAnika MD   Continuous Blood Gluc Sensor (DEXCOM G4 SENSOR) MISC 1 Device by Other route continuous  Anika Solitario MD   Insulin Degludec 200 UNIT/ML SOPN Inject into the skin  Patient not taking: Reported on 8/26/2024  Automatic Reconciliation, Ar       Allergies:     Allergies   Allergen Reactions    Sulfamethoxazole-Trimethoprim Anaphylaxis and Rash    Peppermint Oil Hives    Oxycodone-Acetaminophen Hives     Hives.    Allergic to oxycodone not Tylenol    Seasonal     Sulfa Antibiotics Hives        Physical Exam:     This is a well developed well nourished adult female in no acute distress.     Oriented to person, place and time.    Mood and affect are appropriate.    Respirations are unlabored and there is no evidence of

## 2025-02-12 ENCOUNTER — TELEPHONE (OUTPATIENT)
Age: 45
End: 2025-02-12

## 2025-02-12 NOTE — TELEPHONE ENCOUNTER
Called patient to reschedule appointment until after EMG is done which is scheduled on 3/5/25. Appointment has been r/s to 3/7/25 at 10:45 am. LM on patients vm to call office if she needs to r/s

## 2025-02-24 ENCOUNTER — PROCEDURE VISIT (OUTPATIENT)
Dept: NEUROLOGY | Age: 45
End: 2025-02-24
Payer: COMMERCIAL

## 2025-02-24 VITALS — HEART RATE: 76 BPM | BODY MASS INDEX: 26.95 KG/M2 | OXYGEN SATURATION: 98 % | WEIGHT: 157 LBS

## 2025-02-24 DIAGNOSIS — G56.03 CARPAL TUNNEL SYNDROME, BILATERAL: ICD-10-CM

## 2025-02-24 DIAGNOSIS — R20.0 NUMBNESS AND TINGLING IN BOTH HANDS: Primary | ICD-10-CM

## 2025-02-24 DIAGNOSIS — Z98.1 S/P CERVICAL SPINAL FUSION: ICD-10-CM

## 2025-02-24 DIAGNOSIS — R20.2 NUMBNESS AND TINGLING IN BOTH HANDS: Primary | ICD-10-CM

## 2025-02-24 DIAGNOSIS — R20.2 NUMBNESS AND TINGLING OF BOTH UPPER EXTREMITIES: ICD-10-CM

## 2025-02-24 DIAGNOSIS — R20.0 NUMBNESS AND TINGLING OF BOTH UPPER EXTREMITIES: ICD-10-CM

## 2025-02-24 PROCEDURE — 95886 MUSC TEST DONE W/N TEST COMP: CPT | Performed by: PSYCHIATRY & NEUROLOGY

## 2025-02-24 PROCEDURE — 95913 NRV CNDJ TEST 13/> STUDIES: CPT | Performed by: PSYCHIATRY & NEUROLOGY

## 2025-02-24 NOTE — PROGRESS NOTES
EMG/Nerve Conduction Study Procedure Note  [address] [place] [address]  [address] [phone]      Hx:    Exam:     44 y.o.  w female     EMG::  BUE    no atrophy.   DTR2+..   hx ACDF neck  anterior L to R..  No Brennan or Pako sign.  No Spurling.  No Lhermitte sign.  No Tinel.  History of paresthesias some flick sign.  Referring:    Dr Brian JORDAN  Technologist ::   Ju Alcala  Hgt:    5 foot 4 inch        Summary     needle EMG upper extremity hands with CV studies.                 Controlled environmental factors / EMG lab.  Temperature.   NCV : sensory segments:     Abnormal = normal bilateral median radial ulnar SCV.       NCV transcarpal sensory segments:      Abnormal = slowed bilateral median transcarpal SCV with peak difference of latency at right side 0.46 ms (UL = 0.20 ms); left side 0.44 ms.  NCV Motor MCV segments:      Normal bilateral median bilateral ulnar MCV.       F-wave studies:      Normal bilateral median bilateral ulnar F waves.      NEEDLE EMG:   Tested muscles::      Normal bilateral FDI APB ADM EDC deltoid muscle testing.              INTERPRETATION:    ELECTROPHYSIOLOGIC EVIDENCE OF EARLY BILATERAL ENTRAPMENT OF THE MEDIAN NERVE AT THE WRIST CARPAL SEGMENTS OF MILD SEVERITY.  NO AXONAL DENERVATION.             CONCLUSION:   Mild bilateral carpal tunnel syndrome.                 Procedure Details:     Correlates with clinical history and examination.    Patient made aware.              Please Note::     Data and waveforms * filed under Procedure.    See Procedure Files for data pages.       [ *NOTE:  parts or all of this report are produced using artificial voice recognition software.  Some speech errors are inherent in such software and may be included in the produced record. ]           Johnny Lyn MD  Consultative  Neurodiagnostics   [employer] [facility] [facility] [place]    [facility],   [address] [address]  Phone:

## 2025-03-18 ENCOUNTER — OFFICE VISIT (OUTPATIENT)
Age: 45
End: 2025-03-18

## 2025-03-18 DIAGNOSIS — Z98.1 S/P CERVICAL SPINAL FUSION: Primary | ICD-10-CM

## 2025-03-18 NOTE — PROGRESS NOTES
Name: Mansi Love  YOB: 1980  Gender: female  MRN: [id number]  Age: 44 y.o.    Chief Complaint: Neck surgery follow up.    History of present illness:    Mansi Love is here for 7-month follow up of her  anterior cervical discectomy and fusion surgery. She reports complete relief of preoperative upper extremity pain, weakness and parasthesias.  The wound is benign.     Medications:   Current Outpatient Medications   Medication Sig    ketorolac (TORADOL) 10 MG tablet Take 1 tablet by mouth every 6 hours as needed for Pain    gabapentin (NEURONTIN) 100 MG capsule Take 1 capsule by mouth 3 times daily for 30 days. Take 1 tablet by mouth at night for 3 nights, then increase to BID morning and night for 3 days, you may taper up to three times a day.    Insulin Disposable Pump (OMNIPOD 5 G6 POD, GEN 5,) MISC Inject into the skin every other day    NOVOLOG 100 UNIT/ML injection vial Insulin pump with Novolog insulin    Continuous Blood Gluc Sensor (DEXCOM G4 SENSOR) MISC 1 Device by Other route continuous    Insulin Degludec 200 UNIT/ML SOPN Inject into the skin (Patient not taking: Reported on 8/26/2024)     No current facility-administered medications for this visit.       Allergies:   Allergies   Allergen Reactions    Sulfamethoxazole-Trimethoprim Anaphylaxis and Rash    Peppermint Oil Hives    Oxycodone-Acetaminophen Hives     Hives.    Allergic to oxycodone not Tylenol    Seasonal     Sulfa Antibiotics Hives        Physical Exam:     Oriented to person, place and time.    Mood and affect are appropriate.    Respirations are unlabored and there is no evidence of cyanosis.    Wound is healed up nicely without erythema or underlying fluctuance.     Sensory testing reveals intact sensation to light touch.    Strength testing in the upper extremity reveals the following based on the 5 point grading scale:       Delt(C5) Bicep(C6) WE(C6) Tricep (C7) WF(C7) (C8) Int (T1)   Right 5 5 5 5 5

## 2025-08-19 ENCOUNTER — OFFICE VISIT (OUTPATIENT)
Age: 45
End: 2025-08-19

## 2025-08-19 DIAGNOSIS — Z98.1 S/P CERVICAL SPINAL FUSION: Primary | ICD-10-CM

## (undated) DEVICE — INTENDED FOR TISSUE SEPARATION, AND OTHER PROCEDURES THAT REQUIRE A SHARP SURGICAL BLADE TO PUNCTURE OR CUT.: Brand: BARD-PARKER ® STAINLESS STEEL BLADES

## (undated) DEVICE — VCARE MEDIUM, UTERINE MANIPULATOR, VAGINAL-CERVICAL-AHLUWALIA'S-RETRACTOR-ELEVATOR: Brand: VCARE

## (undated) DEVICE — APPLICATOR MEDICATED 26 CC SOLUTION HI LT ORNG CHLORAPREP

## (undated) DEVICE — Device

## (undated) DEVICE — INSUFFLATION NEEDLE TO ESTABLISH PNEUMOPERITONEUM.: Brand: INSUFFLATION NEEDLE

## (undated) DEVICE — GOWN,REINF,POLY,ECL,PP SLV,3XL,XLONG: Brand: MEDLINE

## (undated) DEVICE — MASTISOL ADHESIVE LIQ 2/3ML

## (undated) DEVICE — SOLUTION IRRIG 3000ML 0.9% SOD CHL USP UROMATIC PLAS CONT

## (undated) DEVICE — PAD PT POS 36 IN SURGYPAD DISP

## (undated) DEVICE — POSTERIOR LAMI VANPLT-LUCAS: Brand: MEDLINE INDUSTRIES, INC.

## (undated) DEVICE — SOLUTION IRRIG 1000ML 0.9% SOD CHL USP POUR PLAS BTL

## (undated) DEVICE — STERILE POLYISOPRENE POWDER-FREE SURGICAL GLOVES WITH EMOLLIENT COATING: Brand: PROTEXIS

## (undated) DEVICE — ABC PROBE 5 MM FOOTSWITCHING PROBE: Brand: ABC

## (undated) DEVICE — TAPE UMBILICAL W1/16XL30IN COTTON ROUND NONRADIOPAQUE

## (undated) DEVICE — STRIP,CLOSURE,WOUND,MEDI-STRIP,1/2X4: Brand: MEDLINE

## (undated) DEVICE — SUTURE MCRYL SZ 4-0 L27IN ABSRB UD L19MM PS-2 1/2 CIR PRIM Y426H

## (undated) DEVICE — SYRINGE MED 10ML LUERLOCK TIP W/O SFTY DISP

## (undated) DEVICE — 3M™ TEGADERM™ TRANSPARENT FILM DRESSING FRAME STYLE, 1626W, 4 IN X 4-3/4 IN (10 CM X 12 CM), 50/CT 4CT/CASE: Brand: 3M™ TEGADERM™

## (undated) DEVICE — STERILE SAFETY PINS #2 2/PK: Brand: MEDLINE

## (undated) DEVICE — LAPAROSCOPIC TROCAR SLEEVE/SINGLE USE: Brand: KII® OPTICAL ACCESS SYSTEM

## (undated) DEVICE — TUBING INSUFFLATION SMK EVAC HI FLO SET PNEUMOCLEAR

## (undated) DEVICE — GLOVE SURG SZ 75 CRM LTX FREE POLYISOPRENE POLYMER BEAD ANTI

## (undated) DEVICE — GLOVE SURG SZ 85 L12IN FNGR THK79MIL GRN LTX FREE

## (undated) DEVICE — TROCAR: Brand: KII FIOS FIRST ENTRY

## (undated) DEVICE — AGENT HEMOSTATIC SURGIFLOW MATRIX KIT W/THROMBIN

## (undated) DEVICE — DRAIN SURG PENROSE 0.25X12 IN CLOSED WND DRAINAGE PREM SIL

## (undated) DEVICE — CANISTER, RIGID, 2000CC: Brand: MEDLINE INDUSTRIES, INC.

## (undated) DEVICE — DRAPE TWL SURG 16X26IN BLU ORB04] ALLCARE INC]

## (undated) DEVICE — SHEARS ENDOSCP L36CM DIA5MM ULTRASONIC CRV TIP W/ ADV

## (undated) DEVICE — TROCAR: Brand: KII® SLEEVE

## (undated) DEVICE — SUTURE MONOCRYL SZ 4-0 L27IN ABSRB UD L19MM PS-2 1/2 CIR PRIM Y426H

## (undated) DEVICE — GOWN,SIRUS,NONRNF,SETINSLV,XL,20/CS: Brand: MEDLINE

## (undated) DEVICE — 3.0MM NEURO (MATCH HEAD) SOFT TOUCH

## (undated) DEVICE — GARMENT,MEDLINE,DVT,INT,CALF,MED, GEN2: Brand: MEDLINE

## (undated) DEVICE — FORCEPS BPLR L210MM TIP L1.5 WRK L105MM STR BAYNT INSUL DISP

## (undated) DEVICE — BIT DRILL 2.3X12MM OZARK CERVICAL SYSTEM

## (undated) DEVICE — SUTURE VICRYL SZ 3-0 L18IN ABSRB UD L26MM SH 1/2 CIR J864D

## (undated) DEVICE — GYN LAPAROSCOPY: Brand: MEDLINE INDUSTRIES, INC.

## (undated) DEVICE — SYRINGE IRRIG 60ML SFT PLIABLE BLB EZ TO GRP 1 HND USE W/

## (undated) DEVICE — SUTURE VCRL SZ 0 L36IN ABSRB UD L36MM CT-1 1/2 CIR J946H